# Patient Record
Sex: FEMALE | Race: WHITE | NOT HISPANIC OR LATINO
[De-identification: names, ages, dates, MRNs, and addresses within clinical notes are randomized per-mention and may not be internally consistent; named-entity substitution may affect disease eponyms.]

---

## 2017-01-03 ENCOUNTER — APPOINTMENT (OUTPATIENT)
Dept: GASTROENTEROLOGY | Facility: CLINIC | Age: 77
End: 2017-01-03

## 2017-01-20 ENCOUNTER — APPOINTMENT (OUTPATIENT)
Dept: GASTROENTEROLOGY | Facility: CLINIC | Age: 77
End: 2017-01-20

## 2017-01-20 ENCOUNTER — MEDICATION RENEWAL (OUTPATIENT)
Age: 77
End: 2017-01-20

## 2017-02-14 ENCOUNTER — APPOINTMENT (OUTPATIENT)
Dept: HEART AND VASCULAR | Facility: CLINIC | Age: 77
End: 2017-02-14

## 2017-02-16 ENCOUNTER — APPOINTMENT (OUTPATIENT)
Dept: SURGICAL ONCOLOGY | Facility: CLINIC | Age: 77
End: 2017-02-16

## 2017-02-16 VITALS
BODY MASS INDEX: 33.49 KG/M2 | OXYGEN SATURATION: 99 % | SYSTOLIC BLOOD PRESSURE: 128 MMHG | HEIGHT: 63 IN | DIASTOLIC BLOOD PRESSURE: 83 MMHG | HEART RATE: 64 BPM | WEIGHT: 189 LBS

## 2017-02-16 DIAGNOSIS — Z85.3 PERSONAL HISTORY OF MALIGNANT NEOPLASM OF BREAST: ICD-10-CM

## 2017-02-21 ENCOUNTER — RESULT REVIEW (OUTPATIENT)
Age: 77
End: 2017-02-21

## 2017-02-22 ENCOUNTER — OUTPATIENT (OUTPATIENT)
Dept: OUTPATIENT SERVICES | Facility: HOSPITAL | Age: 77
LOS: 1 days | Discharge: HOME | End: 2017-02-22
Payer: MEDICARE

## 2017-02-22 ENCOUNTER — APPOINTMENT (OUTPATIENT)
Dept: GASTROENTEROLOGY | Facility: HOSPITAL | Age: 77
End: 2017-02-22

## 2017-02-22 DIAGNOSIS — Z90.12 ACQUIRED ABSENCE OF LEFT BREAST AND NIPPLE: Chronic | ICD-10-CM

## 2017-02-22 DIAGNOSIS — Z95.5 PRESENCE OF CORONARY ANGIOPLASTY IMPLANT AND GRAFT: Chronic | ICD-10-CM

## 2017-02-22 PROCEDURE — 45385 COLONOSCOPY W/LESION REMOVAL: CPT

## 2017-02-22 PROCEDURE — 45380 COLONOSCOPY AND BIOPSY: CPT | Mod: XS

## 2017-02-22 PROCEDURE — 88305 TISSUE EXAM BY PATHOLOGIST: CPT

## 2017-02-22 RX ORDER — INDOCYANINE GREEN 25 MG
25 KIT INTRAVASCULAR; INTRAVENOUS ONCE
Qty: 0 | Refills: 0 | Status: DISCONTINUED | OUTPATIENT
Start: 2017-02-22 | End: 2017-02-22

## 2017-02-23 ENCOUNTER — RX RENEWAL (OUTPATIENT)
Age: 77
End: 2017-02-23

## 2017-02-23 LAB — SURGICAL PATHOLOGY STUDY: SIGNIFICANT CHANGE UP

## 2017-02-24 ENCOUNTER — RX RENEWAL (OUTPATIENT)
Age: 77
End: 2017-02-24

## 2017-03-02 DIAGNOSIS — Z95.5 PRESENCE OF CORONARY ANGIOPLASTY IMPLANT AND GRAFT: ICD-10-CM

## 2017-03-02 DIAGNOSIS — Z85.3 PERSONAL HISTORY OF MALIGNANT NEOPLASM OF BREAST: ICD-10-CM

## 2017-03-02 DIAGNOSIS — I25.10 ATHEROSCLEROTIC HEART DISEASE OF NATIVE CORONARY ARTERY WITHOUT ANGINA PECTORIS: ICD-10-CM

## 2017-03-02 DIAGNOSIS — D12.4 BENIGN NEOPLASM OF DESCENDING COLON: ICD-10-CM

## 2017-03-02 DIAGNOSIS — Z88.8 ALLERGY STATUS TO OTHER DRUGS, MEDICAMENTS AND BIOLOGICAL SUBSTANCES STATUS: ICD-10-CM

## 2017-03-02 DIAGNOSIS — K57.30 DIVERTICULOSIS OF LARGE INTESTINE WITHOUT PERFORATION OR ABSCESS WITHOUT BLEEDING: ICD-10-CM

## 2017-03-02 DIAGNOSIS — I10 ESSENTIAL (PRIMARY) HYPERTENSION: ICD-10-CM

## 2017-03-02 DIAGNOSIS — L40.9 PSORIASIS, UNSPECIFIED: ICD-10-CM

## 2017-03-02 DIAGNOSIS — D12.2 BENIGN NEOPLASM OF ASCENDING COLON: ICD-10-CM

## 2017-03-02 DIAGNOSIS — Z79.82 LONG TERM (CURRENT) USE OF ASPIRIN: ICD-10-CM

## 2017-03-02 DIAGNOSIS — Z92.21 PERSONAL HISTORY OF ANTINEOPLASTIC CHEMOTHERAPY: ICD-10-CM

## 2017-03-02 DIAGNOSIS — F32.9 MAJOR DEPRESSIVE DISORDER, SINGLE EPISODE, UNSPECIFIED: ICD-10-CM

## 2017-03-20 ENCOUNTER — RX RENEWAL (OUTPATIENT)
Age: 77
End: 2017-03-20

## 2017-04-17 ENCOUNTER — RX RENEWAL (OUTPATIENT)
Age: 77
End: 2017-04-17

## 2017-04-26 ENCOUNTER — RX RENEWAL (OUTPATIENT)
Age: 77
End: 2017-04-26

## 2017-05-02 ENCOUNTER — APPOINTMENT (OUTPATIENT)
Dept: GASTROENTEROLOGY | Facility: CLINIC | Age: 77
End: 2017-05-02

## 2017-05-02 VITALS
HEIGHT: 63 IN | WEIGHT: 190 LBS | HEART RATE: 65 BPM | TEMPERATURE: 98.1 F | SYSTOLIC BLOOD PRESSURE: 126 MMHG | BODY MASS INDEX: 33.66 KG/M2 | OXYGEN SATURATION: 97 % | DIASTOLIC BLOOD PRESSURE: 75 MMHG | RESPIRATION RATE: 14 BRPM

## 2017-05-23 ENCOUNTER — MEDICATION RENEWAL (OUTPATIENT)
Age: 77
End: 2017-05-23

## 2017-05-24 ENCOUNTER — RX RENEWAL (OUTPATIENT)
Age: 77
End: 2017-05-24

## 2017-06-26 ENCOUNTER — RX RENEWAL (OUTPATIENT)
Age: 77
End: 2017-06-26

## 2017-07-12 ENCOUNTER — CLINICAL ADVICE (OUTPATIENT)
Age: 77
End: 2017-07-12

## 2017-07-13 ENCOUNTER — RX RENEWAL (OUTPATIENT)
Age: 77
End: 2017-07-13

## 2017-07-25 ENCOUNTER — RX RENEWAL (OUTPATIENT)
Age: 77
End: 2017-07-25

## 2017-08-18 ENCOUNTER — RX RENEWAL (OUTPATIENT)
Age: 77
End: 2017-08-18

## 2017-08-22 ENCOUNTER — APPOINTMENT (OUTPATIENT)
Dept: HEART AND VASCULAR | Facility: CLINIC | Age: 77
End: 2017-08-22
Payer: MEDICARE

## 2017-08-22 VITALS
TEMPERATURE: 97.5 F | HEART RATE: 72 BPM | OXYGEN SATURATION: 98 % | HEIGHT: 63 IN | SYSTOLIC BLOOD PRESSURE: 100 MMHG | WEIGHT: 184 LBS | DIASTOLIC BLOOD PRESSURE: 70 MMHG | RESPIRATION RATE: 14 BRPM | BODY MASS INDEX: 32.6 KG/M2

## 2017-08-22 PROCEDURE — 93000 ELECTROCARDIOGRAM COMPLETE: CPT

## 2017-08-22 PROCEDURE — 99214 OFFICE O/P EST MOD 30 MIN: CPT | Mod: 25

## 2017-08-22 PROCEDURE — 36415 COLL VENOUS BLD VENIPUNCTURE: CPT

## 2017-08-23 LAB
25(OH)D3 SERPL-MCNC: 36.6 NG/ML
ALBUMIN SERPL ELPH-MCNC: 4 G/DL
ALP BLD-CCNC: 71 U/L
ALT SERPL-CCNC: 21 U/L
ANION GAP SERPL CALC-SCNC: 11 MMOL/L
AST SERPL-CCNC: 15 U/L
BASOPHILS # BLD AUTO: 0.01 K/UL
BASOPHILS NFR BLD AUTO: 0.2 %
BILIRUB SERPL-MCNC: 0.4 MG/DL
BUN SERPL-MCNC: 20 MG/DL
CALCIUM SERPL-MCNC: 10.6 MG/DL
CHLORIDE SERPL-SCNC: 103 MMOL/L
CHOLEST SERPL-MCNC: 152 MG/DL
CHOLEST/HDLC SERPL: 3.2 RATIO
CO2 SERPL-SCNC: 29 MMOL/L
CREAT SERPL-MCNC: 0.88 MG/DL
EOSINOPHIL # BLD AUTO: 0.17 K/UL
EOSINOPHIL NFR BLD AUTO: 2.9 %
ERYTHROCYTE [SEDIMENTATION RATE] IN BLOOD BY WESTERGREN METHOD: 22 MM/HR
GLUCOSE SERPL-MCNC: 96 MG/DL
HBA1C MFR BLD HPLC: 5.6 %
HCT VFR BLD CALC: 48.3 %
HDLC SERPL-MCNC: 48 MG/DL
HGB BLD-MCNC: 14.4 G/DL
IMM GRANULOCYTES NFR BLD AUTO: 0.2 %
LDLC SERPL CALC-MCNC: 76 MG/DL
LYMPHOCYTES # BLD AUTO: 0.91 K/UL
LYMPHOCYTES NFR BLD AUTO: 15.6 %
MAN DIFF?: NORMAL
MCHC RBC-ENTMCNC: 29.8 GM/DL
MCHC RBC-ENTMCNC: 30.5 PG
MCV RBC AUTO: 102.3 FL
MONOCYTES # BLD AUTO: 0.31 K/UL
MONOCYTES NFR BLD AUTO: 5.3 %
NEUTROPHILS # BLD AUTO: 4.41 K/UL
NEUTROPHILS NFR BLD AUTO: 75.8 %
PLATELET # BLD AUTO: 193 K/UL
POTASSIUM SERPL-SCNC: 5.4 MMOL/L
PROT SERPL-MCNC: 6.9 G/DL
RBC # BLD: 4.72 M/UL
RBC # FLD: 14.3 %
RHEUMATOID FACT SER QL: 9 IU/ML
SODIUM SERPL-SCNC: 143 MMOL/L
TRIGL SERPL-MCNC: 139 MG/DL
TSH SERPL-ACNC: 1.37 UIU/ML
WBC # FLD AUTO: 5.82 K/UL

## 2017-08-24 LAB
ANA PAT FLD IF-IMP: ABNORMAL
ANA SER IF-ACNC: ABNORMAL
B BURGDOR AB SER-IMP: NEGATIVE
B BURGDOR IGM PATRN SER IB-IMP: NEGATIVE
B BURGDOR18/20KD IGM SER QL IB: NORMAL
B BURGDOR18KD IGG SER QL IB: NORMAL
B BURGDOR23KD IGG SER QL IB: NORMAL
B BURGDOR23KD IGM SER QL IB: NORMAL
B BURGDOR28KD AB SER QL IB: NORMAL
B BURGDOR28KD IGG SER QL IB: NORMAL
B BURGDOR30KD AB SER QL IB: NORMAL
B BURGDOR30KD IGG SER QL IB: NORMAL
B BURGDOR31KD IGG SER QL IB: NORMAL
B BURGDOR31KD IGM SER QL IB: NORMAL
B BURGDOR39KD IGG SER QL IB: NORMAL
B BURGDOR39KD IGM SER QL IB: NORMAL
B BURGDOR41KD IGG SER QL IB: PRESENT
B BURGDOR41KD IGM SER QL IB: NORMAL
B BURGDOR45KD AB SER QL IB: NORMAL
B BURGDOR45KD IGG SER QL IB: NORMAL
B BURGDOR58KD AB SER QL IB: NORMAL
B BURGDOR58KD IGG SER QL IB: PRESENT
B BURGDOR66KD IGG SER QL IB: NORMAL
B BURGDOR66KD IGM SER QL IB: NORMAL
B BURGDOR93KD IGG SER QL IB: NORMAL
B BURGDOR93KD IGM SER QL IB: NORMAL
URATE SERPL-MCNC: 5.8 MG/DL

## 2017-09-14 ENCOUNTER — RX RENEWAL (OUTPATIENT)
Age: 77
End: 2017-09-14

## 2017-09-28 ENCOUNTER — APPOINTMENT (OUTPATIENT)
Dept: HEART AND VASCULAR | Facility: CLINIC | Age: 77
End: 2017-09-28
Payer: MEDICARE

## 2017-09-28 VITALS
SYSTOLIC BLOOD PRESSURE: 122 MMHG | TEMPERATURE: 97.4 F | HEART RATE: 60 BPM | OXYGEN SATURATION: 97 % | WEIGHT: 180 LBS | HEIGHT: 63 IN | RESPIRATION RATE: 14 BRPM | DIASTOLIC BLOOD PRESSURE: 80 MMHG | BODY MASS INDEX: 31.89 KG/M2

## 2017-09-28 PROCEDURE — 93306 TTE W/DOPPLER COMPLETE: CPT

## 2017-09-28 PROCEDURE — 99214 OFFICE O/P EST MOD 30 MIN: CPT | Mod: 25

## 2017-09-28 PROCEDURE — 93880 EXTRACRANIAL BILAT STUDY: CPT | Mod: XE

## 2017-10-04 ENCOUNTER — OUTPATIENT (OUTPATIENT)
Dept: OUTPATIENT SERVICES | Facility: HOSPITAL | Age: 77
LOS: 1 days | End: 2017-10-04
Payer: MEDICARE

## 2017-10-04 DIAGNOSIS — Z90.12 ACQUIRED ABSENCE OF LEFT BREAST AND NIPPLE: Chronic | ICD-10-CM

## 2017-10-04 DIAGNOSIS — Z95.5 PRESENCE OF CORONARY ANGIOPLASTY IMPLANT AND GRAFT: Chronic | ICD-10-CM

## 2017-10-04 PROCEDURE — 71250 CT THORAX DX C-: CPT

## 2017-10-04 PROCEDURE — 70491 CT SOFT TISSUE NECK W/DYE: CPT | Mod: 26

## 2017-10-04 PROCEDURE — 71250 CT THORAX DX C-: CPT | Mod: 26

## 2017-10-04 PROCEDURE — 70491 CT SOFT TISSUE NECK W/DYE: CPT

## 2017-10-08 ENCOUNTER — RX RENEWAL (OUTPATIENT)
Age: 77
End: 2017-10-08

## 2017-10-12 ENCOUNTER — APPOINTMENT (OUTPATIENT)
Dept: SURGICAL ONCOLOGY | Facility: CLINIC | Age: 77
End: 2017-10-12
Payer: MEDICARE

## 2017-10-12 VITALS
WEIGHT: 180 LBS | BODY MASS INDEX: 31.89 KG/M2 | DIASTOLIC BLOOD PRESSURE: 67 MMHG | SYSTOLIC BLOOD PRESSURE: 100 MMHG | RESPIRATION RATE: 15 BRPM | HEART RATE: 64 BPM | HEIGHT: 63 IN

## 2017-10-12 PROCEDURE — 99214 OFFICE O/P EST MOD 30 MIN: CPT

## 2017-10-18 ENCOUNTER — APPOINTMENT (OUTPATIENT)
Dept: ENDOCRINOLOGY | Facility: CLINIC | Age: 77
End: 2017-10-18
Payer: MEDICARE

## 2017-10-18 VITALS
WEIGHT: 181 LBS | SYSTOLIC BLOOD PRESSURE: 95 MMHG | BODY MASS INDEX: 32.07 KG/M2 | HEIGHT: 63 IN | DIASTOLIC BLOOD PRESSURE: 65 MMHG | HEART RATE: 66 BPM

## 2017-10-18 PROCEDURE — 76536 US EXAM OF HEAD AND NECK: CPT

## 2017-10-18 PROCEDURE — 99202 OFFICE O/P NEW SF 15 MIN: CPT | Mod: 25

## 2017-10-23 ENCOUNTER — APPOINTMENT (OUTPATIENT)
Dept: GASTROENTEROLOGY | Facility: CLINIC | Age: 77
End: 2017-10-23
Payer: MEDICARE

## 2017-10-23 ENCOUNTER — APPOINTMENT (OUTPATIENT)
Dept: HEART AND VASCULAR | Facility: CLINIC | Age: 77
End: 2017-10-23
Payer: MEDICARE

## 2017-10-23 VITALS
WEIGHT: 181 LBS | OXYGEN SATURATION: 98 % | BODY MASS INDEX: 32.06 KG/M2 | HEART RATE: 72 BPM | SYSTOLIC BLOOD PRESSURE: 140 MMHG | TEMPERATURE: 98.4 F | DIASTOLIC BLOOD PRESSURE: 80 MMHG | RESPIRATION RATE: 14 BRPM

## 2017-10-23 VITALS
SYSTOLIC BLOOD PRESSURE: 124 MMHG | HEART RATE: 70 BPM | RESPIRATION RATE: 14 BRPM | BODY MASS INDEX: 37.21 KG/M2 | WEIGHT: 210 LBS | HEIGHT: 63 IN | TEMPERATURE: 97.6 F | DIASTOLIC BLOOD PRESSURE: 80 MMHG | OXYGEN SATURATION: 96 %

## 2017-10-23 DIAGNOSIS — Z85.3 ENCOUNTER FOR FOLLOW-UP EXAMINATION AFTER COMPLETED TREATMENT FOR MALIGNANT NEOPLASM: ICD-10-CM

## 2017-10-23 DIAGNOSIS — K58.9 IRRITABLE BOWEL SYNDROME W/OUT DIARRHEA: ICD-10-CM

## 2017-10-23 DIAGNOSIS — M25.562 PAIN IN LEFT KNEE: ICD-10-CM

## 2017-10-23 DIAGNOSIS — Z08 ENCOUNTER FOR FOLLOW-UP EXAMINATION AFTER COMPLETED TREATMENT FOR MALIGNANT NEOPLASM: ICD-10-CM

## 2017-10-23 DIAGNOSIS — M54.2 CERVICALGIA: ICD-10-CM

## 2017-10-23 DIAGNOSIS — E78.00 PURE HYPERCHOLESTEROLEMIA, UNSPECIFIED: ICD-10-CM

## 2017-10-23 DIAGNOSIS — E16.2 HYPOGLYCEMIA, UNSPECIFIED: ICD-10-CM

## 2017-10-23 DIAGNOSIS — R59.1 GENERALIZED ENLARGED LYMPH NODES: ICD-10-CM

## 2017-10-23 DIAGNOSIS — I34.0 NONRHEUMATIC MITRAL (VALVE) INSUFFICIENCY: ICD-10-CM

## 2017-10-23 DIAGNOSIS — Z01.818 ENCOUNTER FOR OTHER PREPROCEDURAL EXAMINATION: ICD-10-CM

## 2017-10-23 DIAGNOSIS — E66.9 OBESITY, UNSPECIFIED: ICD-10-CM

## 2017-10-23 DIAGNOSIS — E83.52 HYPERCALCEMIA: ICD-10-CM

## 2017-10-23 PROCEDURE — 99214 OFFICE O/P EST MOD 30 MIN: CPT

## 2017-10-23 RX ORDER — DOXYCYCLINE HYCLATE 100 MG/1
100 TABLET ORAL
Qty: 14 | Refills: 0 | Status: DISCONTINUED | COMMUNITY
Start: 2017-08-22 | End: 2017-10-23

## 2017-10-24 PROBLEM — E83.52 HYPERCALCEMIA: Status: ACTIVE | Noted: 2017-10-24

## 2017-10-26 ENCOUNTER — RX RENEWAL (OUTPATIENT)
Age: 77
End: 2017-10-26

## 2017-11-22 ENCOUNTER — LABORATORY RESULT (OUTPATIENT)
Age: 77
End: 2017-11-22

## 2017-11-28 ENCOUNTER — RX RENEWAL (OUTPATIENT)
Age: 77
End: 2017-11-28

## 2017-11-28 ENCOUNTER — MEDICATION RENEWAL (OUTPATIENT)
Age: 77
End: 2017-11-28

## 2018-01-08 ENCOUNTER — RX RENEWAL (OUTPATIENT)
Age: 78
End: 2018-01-08

## 2018-02-23 ENCOUNTER — RX RENEWAL (OUTPATIENT)
Age: 78
End: 2018-02-23

## 2018-03-22 ENCOUNTER — MEDICATION RENEWAL (OUTPATIENT)
Age: 78
End: 2018-03-22

## 2018-05-21 ENCOUNTER — RX RENEWAL (OUTPATIENT)
Age: 78
End: 2018-05-21

## 2018-05-24 ENCOUNTER — RX RENEWAL (OUTPATIENT)
Age: 78
End: 2018-05-24

## 2018-06-21 ENCOUNTER — RX RENEWAL (OUTPATIENT)
Age: 78
End: 2018-06-21

## 2018-06-25 ENCOUNTER — RX RENEWAL (OUTPATIENT)
Age: 78
End: 2018-06-25

## 2018-07-16 ENCOUNTER — APPOINTMENT (OUTPATIENT)
Dept: GASTROENTEROLOGY | Facility: CLINIC | Age: 78
End: 2018-07-16
Payer: MEDICARE

## 2018-07-16 VITALS
BODY MASS INDEX: 32.07 KG/M2 | SYSTOLIC BLOOD PRESSURE: 130 MMHG | DIASTOLIC BLOOD PRESSURE: 78 MMHG | RESPIRATION RATE: 14 BRPM | HEART RATE: 65 BPM | OXYGEN SATURATION: 98 % | HEIGHT: 63 IN | WEIGHT: 181 LBS

## 2018-07-16 PROCEDURE — 99214 OFFICE O/P EST MOD 30 MIN: CPT

## 2018-07-19 ENCOUNTER — TRANSCRIPTION ENCOUNTER (OUTPATIENT)
Age: 78
End: 2018-07-19

## 2018-08-09 ENCOUNTER — RX RENEWAL (OUTPATIENT)
Age: 78
End: 2018-08-09

## 2018-08-29 ENCOUNTER — RX RENEWAL (OUTPATIENT)
Age: 78
End: 2018-08-29

## 2018-09-24 ENCOUNTER — RX RENEWAL (OUTPATIENT)
Age: 78
End: 2018-09-24

## 2018-10-04 ENCOUNTER — APPOINTMENT (OUTPATIENT)
Dept: HEART AND VASCULAR | Facility: CLINIC | Age: 78
End: 2018-10-04

## 2018-10-25 ENCOUNTER — RX RENEWAL (OUTPATIENT)
Age: 78
End: 2018-10-25

## 2018-11-08 ENCOUNTER — APPOINTMENT (OUTPATIENT)
Dept: HEART AND VASCULAR | Facility: CLINIC | Age: 78
End: 2018-11-08
Payer: MEDICARE

## 2018-11-08 VITALS
OXYGEN SATURATION: 97 % | DIASTOLIC BLOOD PRESSURE: 70 MMHG | TEMPERATURE: 97.9 F | HEIGHT: 63 IN | HEART RATE: 59 BPM | WEIGHT: 177 LBS | BODY MASS INDEX: 31.36 KG/M2 | SYSTOLIC BLOOD PRESSURE: 120 MMHG

## 2018-11-08 DIAGNOSIS — E04.1 NONTOXIC SINGLE THYROID NODULE: ICD-10-CM

## 2018-11-08 PROCEDURE — 36415 COLL VENOUS BLD VENIPUNCTURE: CPT

## 2018-11-08 PROCEDURE — G0439: CPT

## 2018-11-08 PROCEDURE — 93000 ELECTROCARDIOGRAM COMPLETE: CPT

## 2018-11-08 RX ORDER — ALUMINUM HYDROXIDE AND MAGNESIUM CARBONATE 254; 237.5 MG/5ML; MG/5ML
254-237.5 LIQUID ORAL
Qty: 3 | Refills: 6 | Status: DISCONTINUED | COMMUNITY
Start: 2017-05-10 | End: 2018-11-08

## 2018-11-08 NOTE — DISCUSSION/SUMMARY
[FreeTextEntry1] : Hypertension, thyroid nodule, Carotid Doppler and Echocardiogram in 4 weeks--Labs drawn and sent. See ENT for left sided cerumen

## 2018-11-08 NOTE — PHYSICAL EXAM
[General Appearance - Well Developed] : well developed [Normal Appearance] : normal appearance [Well Groomed] : well groomed [General Appearance - Well Nourished] : well nourished [No Deformities] : no deformities [General Appearance - In No Acute Distress] : no acute distress [Normal Conjunctiva] : the conjunctiva exhibited no abnormalities [Normal Oral Mucosa] : normal oral mucosa [No Oral Pallor] : no oral pallor [No Oral Cyanosis] : no oral cyanosis [] : no respiratory distress [Respiration, Rhythm And Depth] : normal respiratory rhythm and effort [Exaggerated Use Of Accessory Muscles For Inspiration] : no accessory muscle use [Auscultation Breath Sounds / Voice Sounds] : lungs were clear to auscultation bilaterally [Heart Sounds] : normal S1 and S2 [Bowel Sounds] : normal bowel sounds [Abdomen Soft] : soft [Abdomen Tenderness] : non-tender [Abnormal Walk] : normal gait [FreeTextEntry1] : no edema [Skin Turgor] : normal skin turgor [Oriented To Time, Place, And Person] : oriented to person, place, and time [Affect] : the affect was normal [Mood] : the mood was normal [No Anxiety] : not feeling anxious

## 2018-11-09 ENCOUNTER — RX RENEWAL (OUTPATIENT)
Age: 78
End: 2018-11-09

## 2018-11-09 LAB
25(OH)D3 SERPL-MCNC: 33.6 NG/ML
ALBUMIN SERPL ELPH-MCNC: 4.5 G/DL
ALP BLD-CCNC: 69 U/L
ALT SERPL-CCNC: 11 U/L
ANION GAP SERPL CALC-SCNC: 10 MMOL/L
AST SERPL-CCNC: 14 U/L
BASOPHILS # BLD AUTO: 0.01 K/UL
BASOPHILS NFR BLD AUTO: 0.2 %
BILIRUB SERPL-MCNC: 0.5 MG/DL
BUN SERPL-MCNC: 15 MG/DL
CALCIUM SERPL-MCNC: 10.4 MG/DL
CHLORIDE SERPL-SCNC: 103 MMOL/L
CHOLEST SERPL-MCNC: 155 MG/DL
CHOLEST/HDLC SERPL: 3 RATIO
CO2 SERPL-SCNC: 33 MMOL/L
CREAT SERPL-MCNC: 0.75 MG/DL
EOSINOPHIL # BLD AUTO: 0.15 K/UL
EOSINOPHIL NFR BLD AUTO: 2.7 %
FOLATE SERPL-MCNC: 16.8 NG/ML
GLUCOSE SERPL-MCNC: 105 MG/DL
HBA1C MFR BLD HPLC: 5.5 %
HCT VFR BLD CALC: 45.8 %
HDLC SERPL-MCNC: 51 MG/DL
HGB BLD-MCNC: 14 G/DL
IMM GRANULOCYTES NFR BLD AUTO: 0.2 %
LDLC SERPL CALC-MCNC: 85 MG/DL
LYMPHOCYTES # BLD AUTO: 0.96 K/UL
LYMPHOCYTES NFR BLD AUTO: 17.5 %
MAN DIFF?: NORMAL
MCHC RBC-ENTMCNC: 30 PG
MCHC RBC-ENTMCNC: 30.6 GM/DL
MCV RBC AUTO: 98.1 FL
MONOCYTES # BLD AUTO: 0.35 K/UL
MONOCYTES NFR BLD AUTO: 6.4 %
NEUTROPHILS # BLD AUTO: 4.01 K/UL
NEUTROPHILS NFR BLD AUTO: 73 %
PLATELET # BLD AUTO: 203 K/UL
POTASSIUM SERPL-SCNC: 5 MMOL/L
PROT SERPL-MCNC: 6.9 G/DL
RBC # BLD: 4.67 M/UL
RBC # FLD: 14.8 %
SODIUM SERPL-SCNC: 146 MMOL/L
TRIGL SERPL-MCNC: 94 MG/DL
TSH SERPL-ACNC: 1.11 UIU/ML
VIT B12 SERPL-MCNC: 488 PG/ML
WBC # FLD AUTO: 5.49 K/UL

## 2018-11-14 ENCOUNTER — RX RENEWAL (OUTPATIENT)
Age: 78
End: 2018-11-14

## 2018-11-14 ENCOUNTER — APPOINTMENT (OUTPATIENT)
Dept: ULTRASOUND IMAGING | Facility: HOSPITAL | Age: 78
End: 2018-11-14
Payer: MEDICARE

## 2018-11-14 ENCOUNTER — OUTPATIENT (OUTPATIENT)
Dept: OUTPATIENT SERVICES | Facility: HOSPITAL | Age: 78
LOS: 1 days | End: 2018-11-14
Payer: MEDICARE

## 2018-11-14 DIAGNOSIS — Z90.12 ACQUIRED ABSENCE OF LEFT BREAST AND NIPPLE: Chronic | ICD-10-CM

## 2018-11-14 DIAGNOSIS — Z95.5 PRESENCE OF CORONARY ANGIOPLASTY IMPLANT AND GRAFT: Chronic | ICD-10-CM

## 2018-11-14 PROCEDURE — 76536 US EXAM OF HEAD AND NECK: CPT

## 2018-11-14 PROCEDURE — 76536 US EXAM OF HEAD AND NECK: CPT | Mod: 26

## 2018-11-16 ENCOUNTER — MEDICATION RENEWAL (OUTPATIENT)
Age: 78
End: 2018-11-16

## 2018-11-18 ENCOUNTER — RX RENEWAL (OUTPATIENT)
Age: 78
End: 2018-11-18

## 2018-11-30 ENCOUNTER — APPOINTMENT (OUTPATIENT)
Dept: CT IMAGING | Facility: HOSPITAL | Age: 78
End: 2018-11-30

## 2018-11-30 ENCOUNTER — APPOINTMENT (OUTPATIENT)
Dept: HEART AND VASCULAR | Facility: CLINIC | Age: 78
End: 2018-11-30
Payer: MEDICARE

## 2018-11-30 ENCOUNTER — OUTPATIENT (OUTPATIENT)
Dept: OUTPATIENT SERVICES | Facility: HOSPITAL | Age: 78
LOS: 1 days | End: 2018-11-30
Payer: MEDICARE

## 2018-11-30 VITALS
RESPIRATION RATE: 14 BRPM | OXYGEN SATURATION: 95 % | SYSTOLIC BLOOD PRESSURE: 144 MMHG | TEMPERATURE: 97.4 F | WEIGHT: 179 LBS | HEART RATE: 63 BPM | HEIGHT: 63 IN | BODY MASS INDEX: 31.71 KG/M2 | DIASTOLIC BLOOD PRESSURE: 76 MMHG

## 2018-11-30 DIAGNOSIS — Z95.5 PRESENCE OF CORONARY ANGIOPLASTY IMPLANT AND GRAFT: Chronic | ICD-10-CM

## 2018-11-30 DIAGNOSIS — R91.1 SOLITARY PULMONARY NODULE: ICD-10-CM

## 2018-11-30 DIAGNOSIS — Z90.12 ACQUIRED ABSENCE OF LEFT BREAST AND NIPPLE: Chronic | ICD-10-CM

## 2018-11-30 PROCEDURE — 71250 CT THORAX DX C-: CPT

## 2018-11-30 PROCEDURE — 99214 OFFICE O/P EST MOD 30 MIN: CPT

## 2018-11-30 PROCEDURE — 71250 CT THORAX DX C-: CPT | Mod: 26

## 2018-11-30 NOTE — HISTORY OF PRESENT ILLNESS
[FreeTextEntry1] : 78 year female who was on Levaquin for a pulmonary infection but stopped when she developed round red circles on her skin. A CXR at [facility] where she was prescribed the Levaquin should a 15 mm nodule. She is scheduled for a CT of her chest with contrast later today. She notes a loose cough with clear mucous. Overall her symptoms are improving but she feels "really wiped out". She can feel shaky and tremor for the past week

## 2018-11-30 NOTE — PHYSICAL EXAM
[General Appearance - Well Developed] : well developed [Normal Appearance] : normal appearance [Well Groomed] : well groomed [General Appearance - Well Nourished] : well nourished [No Deformities] : no deformities [General Appearance - In No Acute Distress] : no acute distress [Normal Conjunctiva] : the conjunctiva exhibited no abnormalities [FreeTextEntry1] : cerumen on left>right [] : no respiratory distress [Respiration, Rhythm And Depth] : normal respiratory rhythm and effort [Exaggerated Use Of Accessory Muscles For Inspiration] : no accessory muscle use [Auscultation Breath Sounds / Voice Sounds] : lungs were clear to auscultation bilaterally [Abnormal Walk] : normal gait [Skin Turgor] : normal skin turgor [Oriented To Time, Place, And Person] : oriented to person, place, and time [Affect] : the affect was normal [Mood] : the mood was normal [No Anxiety] : not feeling anxious

## 2018-12-07 ENCOUNTER — APPOINTMENT (OUTPATIENT)
Dept: HEART AND VASCULAR | Facility: CLINIC | Age: 78
End: 2018-12-07

## 2018-12-07 ENCOUNTER — APPOINTMENT (OUTPATIENT)
Dept: HEART AND VASCULAR | Facility: CLINIC | Age: 78
End: 2018-12-07
Payer: MEDICARE

## 2018-12-07 VITALS
SYSTOLIC BLOOD PRESSURE: 120 MMHG | DIASTOLIC BLOOD PRESSURE: 60 MMHG | RESPIRATION RATE: 13 BRPM | WEIGHT: 179 LBS | BODY MASS INDEX: 31.71 KG/M2 | HEIGHT: 63 IN | TEMPERATURE: 97.1 F | OXYGEN SATURATION: 94 % | HEART RATE: 78 BPM

## 2018-12-07 DIAGNOSIS — R05 COUGH: ICD-10-CM

## 2018-12-07 DIAGNOSIS — R93.89 ABNORMAL FINDINGS ON DIAGNOSTIC IMAGING OF OTHER SPECIFIED BODY STRUCTURES: ICD-10-CM

## 2018-12-07 PROCEDURE — 99214 OFFICE O/P EST MOD 30 MIN: CPT

## 2018-12-07 NOTE — PHYSICAL EXAM
[General Appearance - Well Developed] : well developed [Normal Appearance] : normal appearance [Well Groomed] : well groomed [General Appearance - Well Nourished] : well nourished [No Deformities] : no deformities [General Appearance - In No Acute Distress] : no acute distress [Normal Oral Mucosa] : normal oral mucosa [No Oral Pallor] : no oral pallor [No Oral Cyanosis] : no oral cyanosis [] : no respiratory distress [Respiration, Rhythm And Depth] : normal respiratory rhythm and effort [Exaggerated Use Of Accessory Muscles For Inspiration] : no accessory muscle use [FreeTextEntry1] : rales on left side [Heart Rate And Rhythm] : heart rate and rhythm were normal [Heart Sounds] : normal S1 and S2 [Abdomen Soft] : soft [Abnormal Walk] : normal gait [Skin Turgor] : normal skin turgor [Oriented To Time, Place, And Person] : oriented to person, place, and time [Affect] : the affect was normal [Mood] : the mood was normal [No Anxiety] : not feeling anxious

## 2018-12-07 NOTE — DISCUSSION/SUMMARY
[FreeTextEntry1] : URI with abnormal chest CT, PE suggestive of pneumonia--Albuterol Nebulizer treatment with subjective improvement but exam with continue crackles at left chest. Start Medrol Dose pack. ProAir inhaler. See Dr Davis ideally next week. If worsens to go to ER at [facility]

## 2018-12-09 ENCOUNTER — INPATIENT (INPATIENT)
Facility: HOSPITAL | Age: 78
LOS: 0 days | Discharge: HOME | DRG: 195 | End: 2018-12-10
Attending: INTERNAL MEDICINE | Admitting: INTERNAL MEDICINE
Payer: COMMERCIAL

## 2018-12-09 VITALS
WEIGHT: 177.91 LBS | TEMPERATURE: 98 F | SYSTOLIC BLOOD PRESSURE: 147 MMHG | RESPIRATION RATE: 18 BRPM | HEART RATE: 75 BPM | DIASTOLIC BLOOD PRESSURE: 85 MMHG | OXYGEN SATURATION: 96 %

## 2018-12-09 DIAGNOSIS — I10 ESSENTIAL (PRIMARY) HYPERTENSION: ICD-10-CM

## 2018-12-09 DIAGNOSIS — J18.9 PNEUMONIA, UNSPECIFIED ORGANISM: ICD-10-CM

## 2018-12-09 DIAGNOSIS — Z91.89 OTHER SPECIFIED PERSONAL RISK FACTORS, NOT ELSEWHERE CLASSIFIED: ICD-10-CM

## 2018-12-09 DIAGNOSIS — F32.9 MAJOR DEPRESSIVE DISORDER, SINGLE EPISODE, UNSPECIFIED: ICD-10-CM

## 2018-12-09 DIAGNOSIS — I25.10 ATHEROSCLEROTIC HEART DISEASE OF NATIVE CORONARY ARTERY WITHOUT ANGINA PECTORIS: ICD-10-CM

## 2018-12-09 DIAGNOSIS — Z90.12 ACQUIRED ABSENCE OF LEFT BREAST AND NIPPLE: Chronic | ICD-10-CM

## 2018-12-09 DIAGNOSIS — Z95.5 PRESENCE OF CORONARY ANGIOPLASTY IMPLANT AND GRAFT: Chronic | ICD-10-CM

## 2018-12-09 DIAGNOSIS — K57.30 DIVERTICULOSIS OF LARGE INTESTINE WITHOUT PERFORATION OR ABSCESS WITHOUT BLEEDING: ICD-10-CM

## 2018-12-09 DIAGNOSIS — Z29.9 ENCOUNTER FOR PROPHYLACTIC MEASURES, UNSPECIFIED: ICD-10-CM

## 2018-12-09 LAB
ALBUMIN SERPL ELPH-MCNC: 4.1 G/DL — SIGNIFICANT CHANGE UP (ref 3.3–5)
ALP SERPL-CCNC: 69 U/L — SIGNIFICANT CHANGE UP (ref 40–120)
ALT FLD-CCNC: 37 U/L — SIGNIFICANT CHANGE UP (ref 10–45)
ANION GAP SERPL CALC-SCNC: 10 MMOL/L — SIGNIFICANT CHANGE UP (ref 5–17)
AST SERPL-CCNC: 27 U/L — SIGNIFICANT CHANGE UP (ref 10–40)
BASOPHILS NFR BLD AUTO: 0.2 % — SIGNIFICANT CHANGE UP (ref 0–2)
BILIRUB SERPL-MCNC: 0.4 MG/DL — SIGNIFICANT CHANGE UP (ref 0.2–1.2)
BUN SERPL-MCNC: 22 MG/DL — SIGNIFICANT CHANGE UP (ref 7–23)
CALCIUM SERPL-MCNC: 10.2 MG/DL — SIGNIFICANT CHANGE UP (ref 8.4–10.5)
CHLORIDE SERPL-SCNC: 99 MMOL/L — SIGNIFICANT CHANGE UP (ref 96–108)
CO2 SERPL-SCNC: 32 MMOL/L — HIGH (ref 22–31)
CREAT SERPL-MCNC: 0.71 MG/DL — SIGNIFICANT CHANGE UP (ref 0.5–1.3)
EOSINOPHIL NFR BLD AUTO: 0.7 % — SIGNIFICANT CHANGE UP (ref 0–6)
GLUCOSE SERPL-MCNC: 109 MG/DL — HIGH (ref 70–99)
HCT VFR BLD CALC: 44.1 % — SIGNIFICANT CHANGE UP (ref 34.5–45)
HGB BLD-MCNC: 14.1 G/DL — SIGNIFICANT CHANGE UP (ref 11.5–15.5)
LACTATE SERPL-SCNC: 2 MMOL/L — SIGNIFICANT CHANGE UP (ref 0.5–2)
LYMPHOCYTES # BLD AUTO: 10.3 % — LOW (ref 13–44)
MCHC RBC-ENTMCNC: 30.3 PG — SIGNIFICANT CHANGE UP (ref 27–34)
MCHC RBC-ENTMCNC: 32 G/DL — SIGNIFICANT CHANGE UP (ref 32–36)
MCV RBC AUTO: 94.8 FL — SIGNIFICANT CHANGE UP (ref 80–100)
MONOCYTES NFR BLD AUTO: 8 % — SIGNIFICANT CHANGE UP (ref 2–14)
NEUTROPHILS NFR BLD AUTO: 80.8 % — HIGH (ref 43–77)
PLATELET # BLD AUTO: 308 K/UL — SIGNIFICANT CHANGE UP (ref 150–400)
POTASSIUM SERPL-MCNC: 3.8 MMOL/L — SIGNIFICANT CHANGE UP (ref 3.5–5.3)
POTASSIUM SERPL-SCNC: 3.8 MMOL/L — SIGNIFICANT CHANGE UP (ref 3.5–5.3)
PROT SERPL-MCNC: 7.4 G/DL — SIGNIFICANT CHANGE UP (ref 6–8.3)
RAPID RVP RESULT: SIGNIFICANT CHANGE UP
RBC # BLD: 4.65 M/UL — SIGNIFICANT CHANGE UP (ref 3.8–5.2)
RBC # FLD: 13.6 % — SIGNIFICANT CHANGE UP (ref 10.3–16.9)
SODIUM SERPL-SCNC: 141 MMOL/L — SIGNIFICANT CHANGE UP (ref 135–145)
WBC # BLD: 12.8 K/UL — HIGH (ref 3.8–10.5)
WBC # FLD AUTO: 12.8 K/UL — HIGH (ref 3.8–10.5)

## 2018-12-09 PROCEDURE — 99285 EMERGENCY DEPT VISIT HI MDM: CPT

## 2018-12-09 PROCEDURE — 99233 SBSQ HOSP IP/OBS HIGH 50: CPT | Mod: GC

## 2018-12-09 PROCEDURE — 71046 X-RAY EXAM CHEST 2 VIEWS: CPT | Mod: 26

## 2018-12-09 RX ORDER — CEFTRIAXONE 500 MG/1
1 INJECTION, POWDER, FOR SOLUTION INTRAMUSCULAR; INTRAVENOUS ONCE
Qty: 0 | Refills: 0 | Status: COMPLETED | OUTPATIENT
Start: 2018-12-09 | End: 2018-12-09

## 2018-12-09 RX ORDER — SODIUM CHLORIDE 9 MG/ML
1000 INJECTION INTRAMUSCULAR; INTRAVENOUS; SUBCUTANEOUS ONCE
Qty: 0 | Refills: 0 | Status: COMPLETED | OUTPATIENT
Start: 2018-12-09 | End: 2018-12-09

## 2018-12-09 RX ORDER — ATENOLOL 25 MG/1
50 TABLET ORAL DAILY
Qty: 0 | Refills: 0 | Status: DISCONTINUED | OUTPATIENT
Start: 2018-12-09 | End: 2018-12-10

## 2018-12-09 RX ORDER — FAMOTIDINE 10 MG/ML
20 INJECTION INTRAVENOUS
Qty: 0 | Refills: 0 | Status: DISCONTINUED | OUTPATIENT
Start: 2018-12-09 | End: 2018-12-10

## 2018-12-09 RX ORDER — ASPIRIN/CALCIUM CARB/MAGNESIUM 324 MG
81 TABLET ORAL DAILY
Qty: 0 | Refills: 0 | Status: DISCONTINUED | OUTPATIENT
Start: 2018-12-09 | End: 2018-12-10

## 2018-12-09 RX ORDER — IPRATROPIUM/ALBUTEROL SULFATE 18-103MCG
3 AEROSOL WITH ADAPTER (GRAM) INHALATION EVERY 6 HOURS
Qty: 0 | Refills: 0 | Status: DISCONTINUED | OUTPATIENT
Start: 2018-12-09 | End: 2018-12-10

## 2018-12-09 RX ORDER — ATORVASTATIN CALCIUM 80 MG/1
20 TABLET, FILM COATED ORAL AT BEDTIME
Qty: 0 | Refills: 0 | Status: DISCONTINUED | OUTPATIENT
Start: 2018-12-09 | End: 2018-12-10

## 2018-12-09 RX ORDER — AZITHROMYCIN 500 MG/1
500 TABLET, FILM COATED ORAL ONCE
Qty: 0 | Refills: 0 | Status: COMPLETED | OUTPATIENT
Start: 2018-12-09 | End: 2018-12-09

## 2018-12-09 RX ORDER — AMLODIPINE BESYLATE 2.5 MG/1
5 TABLET ORAL DAILY
Qty: 0 | Refills: 0 | Status: DISCONTINUED | OUTPATIENT
Start: 2018-12-09 | End: 2018-12-10

## 2018-12-09 RX ORDER — AZITHROMYCIN 500 MG/1
500 TABLET, FILM COATED ORAL DAILY
Qty: 0 | Refills: 0 | Status: DISCONTINUED | OUTPATIENT
Start: 2018-12-10 | End: 2018-12-10

## 2018-12-09 RX ORDER — DULOXETINE HYDROCHLORIDE 30 MG/1
60 CAPSULE, DELAYED RELEASE ORAL DAILY
Qty: 0 | Refills: 0 | Status: DISCONTINUED | OUTPATIENT
Start: 2018-12-09 | End: 2018-12-10

## 2018-12-09 RX ORDER — CEFTRIAXONE 500 MG/1
1 INJECTION, POWDER, FOR SOLUTION INTRAMUSCULAR; INTRAVENOUS EVERY 24 HOURS
Qty: 0 | Refills: 0 | Status: DISCONTINUED | OUTPATIENT
Start: 2018-12-10 | End: 2018-12-10

## 2018-12-09 RX ADMIN — SODIUM CHLORIDE 1000 MILLILITER(S): 9 INJECTION INTRAMUSCULAR; INTRAVENOUS; SUBCUTANEOUS at 12:10

## 2018-12-09 RX ADMIN — DULOXETINE HYDROCHLORIDE 60 MILLIGRAM(S): 30 CAPSULE, DELAYED RELEASE ORAL at 16:11

## 2018-12-09 RX ADMIN — SODIUM CHLORIDE 1000 MILLILITER(S): 9 INJECTION INTRAMUSCULAR; INTRAVENOUS; SUBCUTANEOUS at 14:37

## 2018-12-09 RX ADMIN — AZITHROMYCIN 255 MILLIGRAM(S): 500 TABLET, FILM COATED ORAL at 12:48

## 2018-12-09 RX ADMIN — FAMOTIDINE 20 MILLIGRAM(S): 10 INJECTION INTRAVENOUS at 16:11

## 2018-12-09 RX ADMIN — AZITHROMYCIN 500 MILLIGRAM(S): 500 TABLET, FILM COATED ORAL at 14:37

## 2018-12-09 RX ADMIN — AMLODIPINE BESYLATE 5 MILLIGRAM(S): 2.5 TABLET ORAL at 16:11

## 2018-12-09 RX ADMIN — ATENOLOL 50 MILLIGRAM(S): 25 TABLET ORAL at 19:17

## 2018-12-09 RX ADMIN — ATORVASTATIN CALCIUM 20 MILLIGRAM(S): 80 TABLET, FILM COATED ORAL at 21:46

## 2018-12-09 RX ADMIN — CEFTRIAXONE 100 GRAM(S): 500 INJECTION, POWDER, FOR SOLUTION INTRAMUSCULAR; INTRAVENOUS at 12:09

## 2018-12-09 RX ADMIN — CEFTRIAXONE 1 GRAM(S): 500 INJECTION, POWDER, FOR SOLUTION INTRAMUSCULAR; INTRAVENOUS at 12:49

## 2018-12-09 RX ADMIN — Medication 100 MILLIGRAM(S): at 21:46

## 2018-12-09 RX ADMIN — Medication 81 MILLIGRAM(S): at 16:11

## 2018-12-09 NOTE — ED PROVIDER NOTE - OBJECTIVE STATEMENT
Pt is an 79yo f, h/o left breast ca, htn, cad s/p stent, Pt is an 79yo f, h/o left breast ca, htn, cad s/p stent, diverticulitis, who p/w persistent cough x 2 wks, prod white/ yellow sputum. No fever, + chills. + mild cp w/ coughing only, no sob. Was seen at [facility] and had cxr done which showed bronchitis, early pna. Pt was started on levaquin which she only took x 2 days due to allergic rxn. Pt in meantime also had ct chest which showed lung nodule(s) and inflammation of bronchi. Pt was seen by Dr. Stevens and started on augmentin and medrol dose pack, currently on day #4, however still not feeling well and is very weak. + chronic lower abd discomfort 2/2 diverticular dz, no vomiting, diarrhea. No dysuria, flank pain.

## 2018-12-09 NOTE — H&P ADULT - PROBLEM SELECTOR PLAN 6
F: No IVF   E: Replete electrolytes for a K of 4 and Mg of 2   N: DASH/TLC  P: SCDs  C: Full Code  D: Admit to [facility]

## 2018-12-09 NOTE — H&P ADULT - NSHPPHYSICALEXAM_GEN_ALL_CORE
VITAL SIGNS:  T(C): 36.7 (12-09-18 @ 14:14), Max: 37.1 (12-09-18 @ 10:39)  T(F): 98.1 (12-09-18 @ 14:14), Max: 98.7 (12-09-18 @ 10:39)  HR: 67 (12-09-18 @ 14:14) (67 - 75)  BP: 132/74 (12-09-18 @ 14:14) (132/74 - 147/85)  BP(mean): --  RR: 18 (12-09-18 @ 14:14) (18 - 18)  SpO2: 96% (12-09-18 @ 14:14) (94% - 96%)  Wt(kg): --    PHYSICAL EXAM:    Constitutional: WDWN resting comfortably in bed; NAD  Head: NC/AT  Eyes: clear conjunctiva  ENT: no nasal discharge; uvula midline, no oropharyngeal erythema or exudates; MMM  Neck: supple; no JVD or thyromegaly  Respiratory: Decreased breath sounds at the left base  Cardiac: +S1/S2; RRR; no M/R/G; PMI non-displaced  Gastrointestinal: soft, NT/ND; no rebound or guarding; +BSx4  Back: spine midline, no bony tenderness or step-offs; no CVAT B/L  Extremities: WWP, no clubbing or cyanosis; no peripheral edema  Musculoskeletal: NROM x4; no joint swelling, tenderness or erythema  Neurologic: AAOx3; CNII-XII grossly intact; no focal deficits  Psychiatric: affect and characteristics of appearance, verbalizations, behaviors are appropriate

## 2018-12-09 NOTE — ED ADULT NURSE NOTE - PMH
Atherosclerosis of coronary artery  s/p PCI  Breast cancer in female  Left  Diverticulitis of colon  Diverticulitis  Essential hypertension  HTN (hypertension)  Gastroesophageal reflux disease  GERD (gastroesophageal reflux disease)  Hyperlipidemia  HLD (hyperlipidemia)

## 2018-12-09 NOTE — H&P ADULT - PROBLEM SELECTOR PLAN 7
1) PCP: Dr Stevens   Contacted on Admission:  2) Date of Contact with PCP:  3) PCP Contacted at Discharge: (Y/N)  4) Summary of Handoff Given to PCP:   5) Post-Discharge Appointment Date and Location:

## 2018-12-09 NOTE — ED ADULT NURSE NOTE - NSIMPLEMENTINTERV_GEN_ALL_ED
Implemented All Universal Safety Interventions:  [place] to call system. Call bell, personal items and telephone within reach. Instruct patient to call for assistance. Room bathroom lighting operational. Non-slip footwear when patient is off stretcher. Physically safe environment: no spills, clutter or unnecessary equipment. Stretcher in lowest position, wheels locked, appropriate side rails in place.

## 2018-12-09 NOTE — H&P ADULT - NSHPSOCIALHISTORY_GEN_ALL_CORE
Former smoker, quits in the 70s  Non-drinker  No illicit drugs  Lives on her own on the [facility]

## 2018-12-09 NOTE — ED ADULT TRIAGE NOTE - CHIEF COMPLAINT QUOTE
Pt c/o cough for the past two weeks, had xray and CT scan,  told her on Friday she has pneumonia and placed on antibiotics.

## 2018-12-09 NOTE — ED PROVIDER NOTE - PHYSICAL EXAMINATION
VITAL SIGNS: I have reviewed nursing notes and confirm.  CONSTITUTIONAL: Well-developed; well-nourished; in no acute distress.   SKIN:  warm and dry, no acute rash.   HEAD:  normocephalic, atraumatic.  EYES: PERRL, EOM intact; conjunctiva and sclera clear.  ENT: No nasal discharge; airway clear.   NECK: Supple; non tender.  CARD: S1, S2 normal; no murmurs, gallops, or rubs. Regular rate and rhythm.   RESP:  + rales to left lung base. No wheezes/ rhonchi.   ABD: Normal bowel sounds; soft; non-distended; non-tender; no guarding/ rebound.  EXT: Normal ROM. No clubbing, cyanosis or edema. 2+ pulses to b/l ue/le.  NEURO: Alert, oriented, grossly unremarkable  PSYCH: Cooperative, mood and affect appropriate.

## 2018-12-09 NOTE — H&P ADULT - HISTORY OF PRESENT ILLNESS
78F PMhx of CAD, HTN, depression, and diverticulosis who presents with cough. 78F PMhx of CAD, HTN, depression, and diverticulosis who presents with worsening cough productive of yellowish sputum. Her cough started about 2 weeks ago and has been getting worse. She was seen at [facility] and diagnosed with PNA, and given Levaquin. She developed blotchiness from the Levaquin, so she stopped after 2 doses. At [facility], they also saw some lung nodules, so her PCP, Dr Stevens sent her for lung CT. When he saw the results of the CT, he wrote her a prescription for Augmentin which he took her off of after 2 days and replaced with a medrol dose pack. She states that she has been feeling worse, getting some body aches, as well as perspiring more than usual. She also reports feeling a bit unsteady, which is worsened by this illness, and chest soreness from all of her coughing. She denies any fevers, chills, headaches, wheezing, sneezing, nausea, vomiting, dysuria, or sick contacts.   Upon arrival to the ED: T 97.8, HR 75, /85, R 18, SpO2 96%RA. She was given ceftriaxone, azithromycin, and 1L NS.

## 2018-12-09 NOTE — ED PROVIDER NOTE - MEDICAL DECISION MAKING DETAILS
Impression: lll pna, failing outpt management w/ abx/ medrol dose pack. Afebrile. HDS. + mild leukocytosis to 12. CMP unremarkable. Pt ordered for ivf and ceftriaxone/ zithromax for cap. Case d/w Dr. Chaudhary, covering for Dr. Stevens. Will admit to hospitalist [facility] for iv [place]x/ iv hydration.

## 2018-12-09 NOTE — ED ADULT NURSE NOTE - PSH
History of heart artery stent    History of lumpectomy of left breast    Other postprocedural status  S/P appendectomy  Status post total hysterectomy  S/P hysterectomy

## 2018-12-09 NOTE — ED ADULT NURSE NOTE - OBJECTIVE STATEMENT
Pt w/ PMH of left-sided breast CA [id number] ago in remission and diverticulitis presents to ED today c/o persistent cough x2 weeks.  Pt had outpatient XR and CT which showed potential pneumonia.  Pt on augmentin, but elicits she is not improving.  Pt denies objective fever, N/V or SOB.  Pt denies taking any medications today.  Pt states she had the flu vax, but did not have the pneumococcal vax.  Pt is pending eval by [facility].

## 2018-12-09 NOTE — H&P ADULT - ATTENDING COMMENTS
Patient seen and examined by myself at bedside. Reports continued cough, denies fevers or chills. Physical exam notable for crackles and egophony over left lower lobe of lung fields, otherwise non-contributory. Will cover with Ceft/Azithro for CAP. Duonebs PRN, Tesslon perles for cough suppression.     Rest of history, physical and plan as detailed above.

## 2018-12-09 NOTE — H&P ADULT - NSHPLABSRESULTS_GEN_ALL_CORE
LABS:                         14.1   12.8  )-----------( 308      ( 09 Dec 2018 11:54 )             44.1     12-09    141  |  99  |  22  ----------------------------<  109<H>  3.8   |  32<H>  |  0.71    Ca    10.2      09 Dec 2018 11:54    TPro  7.4  /  Alb  4.1  /  TBili  0.4  /  DBili  x   /  AST  27  /  ALT  37  /  AlkPhos  69  12-09        Lactate, Blood: 2.0 mmoL/L (12-09 @ 11:54)      RADIOLOGY, EKG & ADDITIONAL TESTS:   < from: Xray Chest 2 Views PA/Lat (12.09.18 @ 11:33) >    INTERPRETATION:  Clinical History: Cough    Frontal and lateral examination of the chest demonstrates left basilar   infiltrates. Mild dextro scoliosis thoracicspine with degenerative   changes. Calcification noted involving aortic knob. Surgical clips   overlying left axillary region.    Impression: Left basilar infiltrates      < end of copied text >    < from: CT Chest No Cont (11.30.18 @ 14:43) >    IMPRESSION:    1. Worsening large airway inflammation characterized by bronchial wall   thickening, bronchiectasis and mucous plugging most pronounced within the   superior segment to the left lower lobe with tubular shaped nodular   opacities as described above the largest measuring 9 mm. These are   thought to be infectious and/or inflammatory etiology however as per   Fleischner society 2017 guidelines, short interval surveillance is   suggested in approximately 3 months after therapeutic administration to   confirm stability and/or resolution.   2. 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 Mild cardiomegaly.   Dense coronary artery calcification.    < end of copied text >

## 2018-12-10 ENCOUNTER — TRANSCRIPTION ENCOUNTER (OUTPATIENT)
Age: 78
End: 2018-12-10

## 2018-12-10 VITALS
DIASTOLIC BLOOD PRESSURE: 79 MMHG | RESPIRATION RATE: 18 BRPM | SYSTOLIC BLOOD PRESSURE: 135 MMHG | HEART RATE: 71 BPM | TEMPERATURE: 97 F | OXYGEN SATURATION: 94 %

## 2018-12-10 LAB
ANION GAP SERPL CALC-SCNC: 11 MMOL/L — SIGNIFICANT CHANGE UP (ref 5–17)
BASOPHILS NFR BLD AUTO: 0.2 % — SIGNIFICANT CHANGE UP (ref 0–2)
BUN SERPL-MCNC: 19 MG/DL — SIGNIFICANT CHANGE UP (ref 7–23)
CALCIUM SERPL-MCNC: 9.3 MG/DL — SIGNIFICANT CHANGE UP (ref 8.4–10.5)
CHLORIDE SERPL-SCNC: 102 MMOL/L — SIGNIFICANT CHANGE UP (ref 96–108)
CO2 SERPL-SCNC: 29 MMOL/L — SIGNIFICANT CHANGE UP (ref 22–31)
CREAT SERPL-MCNC: 0.65 MG/DL — SIGNIFICANT CHANGE UP (ref 0.5–1.3)
EOSINOPHIL NFR BLD AUTO: 2.4 % — SIGNIFICANT CHANGE UP (ref 0–6)
GLUCOSE SERPL-MCNC: 110 MG/DL — HIGH (ref 70–99)
HCT VFR BLD CALC: 40.9 % — SIGNIFICANT CHANGE UP (ref 34.5–45)
HGB BLD-MCNC: 12.8 G/DL — SIGNIFICANT CHANGE UP (ref 11.5–15.5)
LYMPHOCYTES # BLD AUTO: 12.8 % — LOW (ref 13–44)
MAGNESIUM SERPL-MCNC: 2 MG/DL — SIGNIFICANT CHANGE UP (ref 1.6–2.6)
MCHC RBC-ENTMCNC: 29.6 PG — SIGNIFICANT CHANGE UP (ref 27–34)
MCHC RBC-ENTMCNC: 31.3 G/DL — LOW (ref 32–36)
MCV RBC AUTO: 94.7 FL — SIGNIFICANT CHANGE UP (ref 80–100)
MONOCYTES NFR BLD AUTO: 11.1 % — SIGNIFICANT CHANGE UP (ref 2–14)
NEUTROPHILS NFR BLD AUTO: 73.5 % — SIGNIFICANT CHANGE UP (ref 43–77)
PLATELET # BLD AUTO: 179 K/UL — SIGNIFICANT CHANGE UP (ref 150–400)
POTASSIUM SERPL-MCNC: 4 MMOL/L — SIGNIFICANT CHANGE UP (ref 3.5–5.3)
POTASSIUM SERPL-SCNC: 4 MMOL/L — SIGNIFICANT CHANGE UP (ref 3.5–5.3)
RBC # BLD: 4.32 M/UL — SIGNIFICANT CHANGE UP (ref 3.8–5.2)
RBC # FLD: 13.8 % — SIGNIFICANT CHANGE UP (ref 10.3–16.9)
SODIUM SERPL-SCNC: 142 MMOL/L — SIGNIFICANT CHANGE UP (ref 135–145)
WBC # BLD: 6.6 K/UL — SIGNIFICANT CHANGE UP (ref 3.8–10.5)
WBC # FLD AUTO: 6.6 K/UL — SIGNIFICANT CHANGE UP (ref 3.8–10.5)

## 2018-12-10 PROCEDURE — 96365 THER/PROPH/DIAG IV INF INIT: CPT

## 2018-12-10 PROCEDURE — 87486 CHLMYD PNEUM DNA AMP PROBE: CPT

## 2018-12-10 PROCEDURE — 36415 COLL VENOUS BLD VENIPUNCTURE: CPT

## 2018-12-10 PROCEDURE — 87798 DETECT AGENT NOS DNA AMP: CPT

## 2018-12-10 PROCEDURE — 80053 COMPREHEN METABOLIC PANEL: CPT

## 2018-12-10 PROCEDURE — 87581 M.PNEUMON DNA AMP PROBE: CPT

## 2018-12-10 PROCEDURE — 99285 EMERGENCY DEPT VISIT HI MDM: CPT | Mod: 25

## 2018-12-10 PROCEDURE — 83605 ASSAY OF LACTIC ACID: CPT

## 2018-12-10 PROCEDURE — 99239 HOSP IP/OBS DSCHRG MGMT >30: CPT

## 2018-12-10 PROCEDURE — 80048 BASIC METABOLIC PNL TOTAL CA: CPT

## 2018-12-10 PROCEDURE — 85025 COMPLETE CBC W/AUTO DIFF WBC: CPT

## 2018-12-10 PROCEDURE — 87633 RESP VIRUS 12-25 TARGETS: CPT

## 2018-12-10 PROCEDURE — 83735 ASSAY OF MAGNESIUM: CPT

## 2018-12-10 PROCEDURE — 87040 BLOOD CULTURE FOR BACTERIA: CPT

## 2018-12-10 PROCEDURE — 96375 TX/PRO/DX INJ NEW DRUG ADDON: CPT

## 2018-12-10 PROCEDURE — 71046 X-RAY EXAM CHEST 2 VIEWS: CPT

## 2018-12-10 RX ORDER — AZITHROMYCIN 500 MG/1
1 TABLET, FILM COATED ORAL
Qty: 2 | Refills: 0
Start: 2018-12-10 | End: 2018-12-11

## 2018-12-10 RX ORDER — CEFPODOXIME PROXETIL 100 MG
1 TABLET ORAL
Qty: 14 | Refills: 0
Start: 2018-12-10 | End: 2018-12-16

## 2018-12-10 RX ORDER — ACETAMINOPHEN 500 MG
1000 TABLET ORAL ONCE
Qty: 0 | Refills: 0 | Status: COMPLETED | OUTPATIENT
Start: 2018-12-10 | End: 2018-12-10

## 2018-12-10 RX ORDER — LANOLIN ALCOHOL/MO/W.PET/CERES
5 CREAM (GRAM) TOPICAL AT BEDTIME
Qty: 0 | Refills: 0 | Status: DISCONTINUED | OUTPATIENT
Start: 2018-12-10 | End: 2018-12-10

## 2018-12-10 RX ADMIN — FAMOTIDINE 20 MILLIGRAM(S): 10 INJECTION INTRAVENOUS at 06:30

## 2018-12-10 RX ADMIN — Medication 1000 MILLIGRAM(S): at 03:37

## 2018-12-10 RX ADMIN — Medication 5 MILLIGRAM(S): at 03:23

## 2018-12-10 RX ADMIN — Medication 400 MILLIGRAM(S): at 03:22

## 2018-12-10 RX ADMIN — Medication 100 MILLIGRAM(S): at 08:53

## 2018-12-10 RX ADMIN — AZITHROMYCIN 500 MILLIGRAM(S): 500 TABLET, FILM COATED ORAL at 12:04

## 2018-12-10 RX ADMIN — Medication 30 MILLILITER(S): at 14:30

## 2018-12-10 RX ADMIN — AMLODIPINE BESYLATE 5 MILLIGRAM(S): 2.5 TABLET ORAL at 06:30

## 2018-12-10 RX ADMIN — CEFTRIAXONE 100 GRAM(S): 500 INJECTION, POWDER, FOR SOLUTION INTRAMUSCULAR; INTRAVENOUS at 12:04

## 2018-12-10 RX ADMIN — Medication 81 MILLIGRAM(S): at 12:04

## 2018-12-10 RX ADMIN — DULOXETINE HYDROCHLORIDE 60 MILLIGRAM(S): 30 CAPSULE, DELAYED RELEASE ORAL at 12:04

## 2018-12-10 RX ADMIN — Medication 100 MILLIGRAM(S): at 14:30

## 2018-12-10 RX ADMIN — ATENOLOL 50 MILLIGRAM(S): 25 TABLET ORAL at 06:30

## 2018-12-10 NOTE — DISCHARGE NOTE ADULT - ADDITIONAL INSTRUCTIONS
Please follow up with your primary care doctor, Dr. Stevens on Jan 4 @ 3:20 pm. His office may contact you to move this appt sooner. Please take abx as prescribed. Please follow up with your primary care doctor, Dr. Stevens on Jan 4 @ 3:20 pm. His office may contact you to move this appt sooner. Please take abx as follows: Cefpodoxime 200mg twice a day for 7 days. Azithromycin once a day for 2 days.

## 2018-12-10 NOTE — DISCHARGE NOTE ADULT - PLAN OF CARE
Resolution of your infection You were admitted to the hospital for treatment of pneumonia (lung infection) which you were found to have on imaging studies. You were treated with IV antibiotics. Please continue to take the prescribed antibiotics and follow up with your primary care doctor. We also recommend a repeat CT of your chest in 3 months to ensure resolution of this. Maintenance of heart health You came to the hospital with a history of coronary artery disease with a stent in the past. Please continue to take your home medications and follow up with your primary doctor. Maintenance of healthy blood pressure You came to the hospital with a history of high blood pressure. Please continue to take your home medications and follow up with your primary doctor. Maintenance of healthy gut You came to the hospital with a history of diverticulosis. Please continue to take your home medications and follow up with your primary doctor.

## 2018-12-10 NOTE — DISCHARGE NOTE ADULT - CARE PLAN
Principal Discharge DX:	Pneumonia of left lung due to infectious organism, unspecified part of lung  Goal:	Resolution of your infection  Assessment and plan of treatment:	You were admitted to the hospital for treatment of pneumonia (lung infection) which you were found to have on imaging studies. You were treated with IV antibiotics. Please continue to take the prescribed antibiotics and follow up with your primary care doctor. We also recommend a repeat CT of your chest in 3 months to ensure resolution of this.  Secondary Diagnosis:	History of heart artery stent  Goal:	Maintenance of heart health  Assessment and plan of treatment:	You came to the hospital with a history of coronary artery disease with a stent in the past. Please continue to take your home medications and follow up with your primary doctor.  Secondary Diagnosis:	Essential hypertension  Goal:	Maintenance of healthy blood pressure  Assessment and plan of treatment:	You came to the hospital with a history of high blood pressure. Please continue to take your home medications and follow up with your primary doctor.  Secondary Diagnosis:	Diverticulitis of colon  Goal:	Maintenance of healthy gut  Assessment and plan of treatment:	You came to the hospital with a history of diverticulosis. Please continue to take your home medications and follow up with your primary doctor.

## 2018-12-10 NOTE — DISCHARGE NOTE ADULT - PATIENT PORTAL LINK FT
You can access the [facility] Patient Portal, offered by [facility], by registering with the following website: [email or web address]

## 2018-12-10 NOTE — DISCHARGE NOTE ADULT - CARE PROVIDER_API CALL
Wiley Stevens), Cardiovascular Disease; Internal Medicine  [address] [place] [id number]  Phone: [phone]  Fax: [phone]

## 2018-12-10 NOTE — DISCHARGE NOTE ADULT - MEDICATION SUMMARY - MEDICATIONS TO STOP TAKING
I will STOP taking the medications listed below when I get home from the hospital:    Flagyl 500 mg oral tablet  -- 1 tab(s) by mouth every 8 hours    Cipro 500 mg oral tablet  -- 1 tab(s) by mouth every 12 hours

## 2018-12-10 NOTE — DISCHARGE NOTE ADULT - HOSPITAL COURSE
78F PMhx of CAD, HTN, depression, and diverticulosis who presented to [facility] on 12/9/18 with worsening cough productive of yellowish sputum x2 weeks. Pt had previously had outpatient trials of Levaquin (to which pt developed a rash and discontinued after 2 doses) and augmentin for 2 days and medrol dose pack.  In ED pt T 97.8, HR 75, /85, R 18, SpO2 96%RA. Labs s/f leukocytosis  (12.8) . CXR showed L basilar infiltrate. Pt given ceftriaxone, azithromycin, and 1L NS.  Pt admitted to [facility] for further management. Pt maintained on CTX and azithromycin. Pt remained afebrile, hemodynamically stable. Leukocytosis downtrended. RVP negative. Bcx negative. Urine legionella pending. Pt currently stable for d/c home on PO abx with outpatient follow up.

## 2018-12-10 NOTE — DISCHARGE NOTE ADULT - MEDICATION SUMMARY - MEDICATIONS TO TAKE
I will START or STAY ON the medications listed below when I get home from the hospital:    Aspirin Enteric Coated 81 mg oral delayed release tablet  -- 1 tab(s) by mouth once a day  -- Indication: For Coronary artery disease    Cymbalta 60 mg oral delayed release capsule  -- 1 cap(s) by mouth once a day  -- Indication: For Depression    Crestor 5 mg oral tablet  -- 1 tab(s) by mouth once a day (at bedtime)  -- Indication: For Coronary artery disease    benzonatate 100 mg oral capsule  -- 1 cap(s) by mouth 3 times a day, As needed, Cough  -- Indication: For Cough    atenolol 50 mg oral tablet  -- 1 tab(s) by mouth once a day  -- Indication: For High blood pressure    amLODIPine 5 mg oral tablet  -- 1 tab(s) by mouth once a day  -- Indication: For High blood pressure    cefpodoxime 200 mg oral tablet  -- 1 tab(s) by mouth 2 times a day   -- Finish all this medication unless otherwise directed by prescriber.  Take with food or milk.    -- Indication: For Pneumonia    azithromycin 250 mg oral tablet  -- 1 tab(s) by mouth once a day   -- Do not take dairy products, antacids, or iron preparations within one hour of this medication.  Finish all this medication unless otherwise directed by prescriber.    -- Indication: For Pneumonia

## 2018-12-11 RX ORDER — ALBUTEROL SULFATE 90 UG/1
108 (90 BASE) AEROSOL, METERED RESPIRATORY (INHALATION)
Qty: 1 | Refills: 2 | Status: COMPLETED | COMMUNITY
Start: 2018-12-07 | End: 2018-12-11

## 2018-12-11 RX ORDER — FLUTICASONE PROPIONATE 50 UG/1
50 SPRAY, METERED NASAL
Qty: 1 | Refills: 2 | Status: COMPLETED | COMMUNITY
Start: 2018-01-08 | End: 2018-12-11

## 2018-12-11 RX ORDER — AMOXICILLIN AND CLAVULANATE POTASSIUM 875; 125 MG/1; MG/1
875-125 TABLET, COATED ORAL TWICE DAILY
Qty: 28 | Refills: 0 | Status: COMPLETED | COMMUNITY
Start: 2018-12-05 | End: 2018-12-11

## 2018-12-11 RX ORDER — METHYLPREDNISOLONE 4 MG/1
4 TABLET ORAL
Qty: 1 | Refills: 0 | Status: COMPLETED | COMMUNITY
Start: 2018-12-07 | End: 2018-12-11

## 2018-12-12 ENCOUNTER — APPOINTMENT (OUTPATIENT)
Dept: PULMONOLOGY | Facility: CLINIC | Age: 78
End: 2018-12-12
Payer: MEDICARE

## 2018-12-12 VITALS
HEIGHT: 63 IN | SYSTOLIC BLOOD PRESSURE: 100 MMHG | TEMPERATURE: 97.1 F | WEIGHT: 179 LBS | DIASTOLIC BLOOD PRESSURE: 80 MMHG | OXYGEN SATURATION: 97 % | BODY MASS INDEX: 31.71 KG/M2 | HEART RATE: 72 BPM

## 2018-12-12 DIAGNOSIS — Z95.5 PRESENCE OF CORONARY ANGIOPLASTY IMPLANT AND GRAFT: ICD-10-CM

## 2018-12-12 DIAGNOSIS — I10 ESSENTIAL (PRIMARY) HYPERTENSION: ICD-10-CM

## 2018-12-12 DIAGNOSIS — I25.10 ATHEROSCLEROTIC HEART DISEASE OF NATIVE CORONARY ARTERY WITHOUT ANGINA PECTORIS: ICD-10-CM

## 2018-12-12 DIAGNOSIS — Z87.891 PERSONAL HISTORY OF NICOTINE DEPENDENCE: ICD-10-CM

## 2018-12-12 DIAGNOSIS — K57.90 DIVERTICULOSIS OF INTESTINE, PART UNSPECIFIED, WITHOUT PERFORATION OR ABSCESS WITHOUT BLEEDING: ICD-10-CM

## 2018-12-12 DIAGNOSIS — Z85.3 PERSONAL HISTORY OF MALIGNANT NEOPLASM OF BREAST: ICD-10-CM

## 2018-12-12 DIAGNOSIS — J18.9 PNEUMONIA, UNSPECIFIED ORGANISM: ICD-10-CM

## 2018-12-12 DIAGNOSIS — F32.9 MAJOR DEPRESSIVE DISORDER, SINGLE EPISODE, UNSPECIFIED: ICD-10-CM

## 2018-12-12 PROCEDURE — 94010 BREATHING CAPACITY TEST: CPT

## 2018-12-12 PROCEDURE — 99204 OFFICE O/P NEW MOD 45 MIN: CPT | Mod: 25

## 2018-12-14 ENCOUNTER — APPOINTMENT (OUTPATIENT)
Dept: HEART AND VASCULAR | Facility: CLINIC | Age: 78
End: 2018-12-14
Payer: MEDICARE

## 2018-12-14 VITALS
SYSTOLIC BLOOD PRESSURE: 124 MMHG | RESPIRATION RATE: 14 BRPM | DIASTOLIC BLOOD PRESSURE: 70 MMHG | WEIGHT: 176.01 LBS | BODY MASS INDEX: 31.19 KG/M2 | HEIGHT: 63 IN | TEMPERATURE: 97.4 F | HEART RATE: 73 BPM | OXYGEN SATURATION: 96 %

## 2018-12-14 LAB
CULTURE RESULTS: SIGNIFICANT CHANGE UP
CULTURE RESULTS: SIGNIFICANT CHANGE UP
SPECIMEN SOURCE: SIGNIFICANT CHANGE UP
SPECIMEN SOURCE: SIGNIFICANT CHANGE UP

## 2018-12-14 PROCEDURE — 99214 OFFICE O/P EST MOD 30 MIN: CPT

## 2018-12-14 RX ORDER — RANITIDINE HYDROCHLORIDE 150 MG/1
150 CAPSULE ORAL
Refills: 0 | Status: DISCONTINUED | COMMUNITY
End: 2018-12-14

## 2018-12-14 RX ORDER — AZITHROMYCIN 250 MG/1
250 TABLET, FILM COATED ORAL DAILY
Refills: 0 | Status: DISCONTINUED | COMMUNITY
Start: 2018-12-11 | End: 2018-12-14

## 2018-12-14 NOTE — PHYSICAL EXAM
[General Appearance - Well Developed] : well developed [Normal Appearance] : normal appearance [Well Groomed] : well groomed [General Appearance - Well Nourished] : well nourished [No Deformities] : no deformities [General Appearance - In No Acute Distress] : no acute distress [Normal Conjunctiva] : the conjunctiva exhibited no abnormalities [] : no respiratory distress [Respiration, Rhythm And Depth] : normal respiratory rhythm and effort [Exaggerated Use Of Accessory Muscles For Inspiration] : no accessory muscle use [Heart Rate And Rhythm] : heart rate and rhythm were normal [Heart Sounds] : normal S1 and S2 [Abdomen Soft] : soft [Abnormal Walk] : normal gait [FreeTextEntry1] : no edema [Skin Turgor] : normal skin turgor [Oriented To Time, Place, And Person] : oriented to person, place, and time [Affect] : the affect was normal [Mood] : the mood was normal [No Anxiety] : not feeling anxious

## 2018-12-14 NOTE — HISTORY OF PRESENT ILLNESS
[FreeTextEntry1] : 78 year female who notes having gotten one Pneumovax in the past but not in the last 5 years. She got the Flu Vax at [facility] this year

## 2018-12-14 NOTE — DISCUSSION/SUMMARY
[FreeTextEntry1] : Community Acquired Pneumonia--Prevnar 13 in 2 weeks then Pneumovax 23 in one year. We discussed Shingrix. Avoid over-exertion and situations where she is likely to be exposed to others who are sick. 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

## 2018-12-20 NOTE — PATIENT PROFILE ADULT - VISION (WITH CORRECTIVE LENSES IF THE PATIENT USUALLY WEARS THEM):
Rx for Doxycycline sent. Pt informed of medication instructions and LSS reccs. Voiced understanding. No further questions. Normal vision: sees adequately in most situations; can see medication labels, newsprint

## 2018-12-27 ENCOUNTER — APPOINTMENT (OUTPATIENT)
Dept: HEART AND VASCULAR | Facility: CLINIC | Age: 78
End: 2018-12-27
Payer: MEDICARE

## 2018-12-27 VITALS
HEART RATE: 68 BPM | OXYGEN SATURATION: 97 % | WEIGHT: 176 LBS | BODY MASS INDEX: 31.18 KG/M2 | HEIGHT: 63 IN | TEMPERATURE: 97.4 F | SYSTOLIC BLOOD PRESSURE: 128 MMHG | RESPIRATION RATE: 14 BRPM | DIASTOLIC BLOOD PRESSURE: 84 MMHG

## 2018-12-27 PROCEDURE — G0009: CPT

## 2018-12-27 PROCEDURE — 99214 OFFICE O/P EST MOD 30 MIN: CPT | Mod: 25

## 2018-12-27 PROCEDURE — 90670 PCV13 VACCINE IM: CPT

## 2018-12-27 NOTE — PHYSICAL EXAM
[General Appearance - Well Developed] : well developed [Normal Appearance] : normal appearance [Well Groomed] : well groomed [General Appearance - Well Nourished] : well nourished [No Deformities] : no deformities [General Appearance - In No Acute Distress] : no acute distress [Normal Conjunctiva] : the conjunctiva exhibited no abnormalities [] : no respiratory distress [Respiration, Rhythm And Depth] : normal respiratory rhythm and effort [Exaggerated Use Of Accessory Muscles For Inspiration] : no accessory muscle use [Auscultation Breath Sounds / Voice Sounds] : lungs were clear to auscultation bilaterally [Heart Sounds] : normal S1 and S2 [Abnormal Walk] : normal gait [Skin Turgor] : normal skin turgor [Oriented To Time, Place, And Person] : oriented to person, place, and time [Affect] : the affect was normal [Mood] : the mood was normal [No Anxiety] : not feeling anxious [FreeTextEntry1] : concha

## 2018-12-27 NOTE — HISTORY OF PRESENT ILLNESS
[FreeTextEntry1] : 78 year female who feels significantly improved. She still has an intermittent dry cough. No fever

## 2018-12-27 NOTE — DISCUSSION/SUMMARY
[FreeTextEntry1] : Pneumovax--OK to socialize but hold on volunteering at [facility]. 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 Prevnar-13 administered. Followup in one month for Echo and Carotid doppler

## 2019-01-05 ENCOUNTER — RX RENEWAL (OUTPATIENT)
Age: 79
End: 2019-01-05

## 2019-01-08 ENCOUNTER — RX RENEWAL (OUTPATIENT)
Age: 79
End: 2019-01-08

## 2019-01-24 ENCOUNTER — APPOINTMENT (OUTPATIENT)
Dept: HEART AND VASCULAR | Facility: CLINIC | Age: 79
End: 2019-01-24
Payer: MEDICARE

## 2019-01-24 VITALS
HEIGHT: 63 IN | HEART RATE: 66 BPM | OXYGEN SATURATION: 95 % | TEMPERATURE: 98 F | BODY MASS INDEX: 31.18 KG/M2 | SYSTOLIC BLOOD PRESSURE: 110 MMHG | WEIGHT: 176 LBS | DIASTOLIC BLOOD PRESSURE: 70 MMHG

## 2019-01-24 PROCEDURE — 99214 OFFICE O/P EST MOD 30 MIN: CPT

## 2019-01-24 NOTE — DISCUSSION/SUMMARY
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 I asked her to return for an Echocardiogram and Carotid Doppler

## 2019-01-24 NOTE — HISTORY OF PRESENT ILLNESS
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 Her cough is 2-3 times a day without sputum. No fever

## 2019-02-14 ENCOUNTER — APPOINTMENT (OUTPATIENT)
Dept: HEART AND VASCULAR | Facility: CLINIC | Age: 79
End: 2019-02-14
Payer: MEDICARE

## 2019-02-14 VITALS
HEART RATE: 64 BPM | OXYGEN SATURATION: 97 % | DIASTOLIC BLOOD PRESSURE: 80 MMHG | HEIGHT: 63 IN | TEMPERATURE: 97.6 F | WEIGHT: 178 LBS | RESPIRATION RATE: 14 BRPM | BODY MASS INDEX: 31.54 KG/M2 | SYSTOLIC BLOOD PRESSURE: 144 MMHG

## 2019-02-14 PROCEDURE — 99214 OFFICE O/P EST MOD 30 MIN: CPT

## 2019-02-14 PROCEDURE — 93880 EXTRACRANIAL BILAT STUDY: CPT

## 2019-02-14 PROCEDURE — 93306 TTE W/DOPPLER COMPLETE: CPT

## 2019-02-14 NOTE — HISTORY OF PRESENT ILLNESS
[FreeTextEntry1] : 78 year female who feels back to her baseline and ready to return to volunteering at [employer]. She notes fatigue, taking Atenolol 50 mg bid

## 2019-02-14 NOTE — DISCUSSION/SUMMARY
[FreeTextEntry1] : At the time of the patient's visit an Echocardiogram was performed to evaluate[]. At the time of the visit the results were reviewed with patient \par \par At the time of the patient's visit a Carotid Doppler was performed to evaluate[]. At the time of the visit the results were reviewed with patient \par \par Hyperdynamic LV, hx of thyroid nodule, lung nodule, hypertension--I asked her to increase her Atenolol to 25 mg in AM and 100 mg at bedtime. See Dr Martin in followup for thyroid sonogram. Shingrix at pharm. CT of chest to followup nodule at [facility]

## 2019-03-05 ENCOUNTER — FORM ENCOUNTER (OUTPATIENT)
Age: 79
End: 2019-03-05

## 2019-03-06 ENCOUNTER — OUTPATIENT (OUTPATIENT)
Dept: OUTPATIENT SERVICES | Facility: HOSPITAL | Age: 79
LOS: 1 days | End: 2019-03-06
Payer: MEDICARE

## 2019-03-06 ENCOUNTER — APPOINTMENT (OUTPATIENT)
Dept: CT IMAGING | Facility: HOSPITAL | Age: 79
End: 2019-03-06
Payer: MEDICARE

## 2019-03-06 DIAGNOSIS — Z90.12 ACQUIRED ABSENCE OF LEFT BREAST AND NIPPLE: Chronic | ICD-10-CM

## 2019-03-06 DIAGNOSIS — Z95.5 PRESENCE OF CORONARY ANGIOPLASTY IMPLANT AND GRAFT: Chronic | ICD-10-CM

## 2019-03-06 PROCEDURE — 71250 CT THORAX DX C-: CPT

## 2019-03-06 PROCEDURE — 71250 CT THORAX DX C-: CPT | Mod: 26

## 2019-03-13 ENCOUNTER — APPOINTMENT (OUTPATIENT)
Dept: PULMONOLOGY | Facility: CLINIC | Age: 79
End: 2019-03-13
Payer: MEDICARE

## 2019-03-13 VITALS
SYSTOLIC BLOOD PRESSURE: 118 MMHG | HEIGHT: 63 IN | HEART RATE: 68 BPM | OXYGEN SATURATION: 97 % | WEIGHT: 178 LBS | TEMPERATURE: 95.8 F | BODY MASS INDEX: 31.54 KG/M2 | DIASTOLIC BLOOD PRESSURE: 76 MMHG

## 2019-03-13 PROCEDURE — 99213 OFFICE O/P EST LOW 20 MIN: CPT | Mod: 25

## 2019-03-13 PROCEDURE — 94010 BREATHING CAPACITY TEST: CPT

## 2019-03-13 NOTE — HISTORY OF PRESENT ILLNESS
[FreeTextEntry1] : 12/12/18: Asked to evaluate patient by Dr Stevens for abnormal CT chest. He was treating her for coughing and wheezing. Initially gave Levaquin but caused rash, then off a few days, got CT, but then given Augmentin (12/5)  and medrol pack last week. Over weekend she was briefly admitted at [facility] and changed to cefpodoxime and azithro which she is on now but with stomach upset. Feels weak. Coughing, but improved. No fever. Not dyspneic but some chest discomfort. No history of lung disease. Former minimal smoker.\par \par 3/13/19: Feels markedly better than when I saw her last. No real residual dyspnea or cough, but does remain more fatigued than her baseline. Has in general not been very active, but now is starting to get back to her usual schedule, for instance is about to return to volunteering at [facility].

## 2019-03-13 NOTE — PHYSICAL EXAM
[General Appearance - In No Acute Distress] : no acute distress [Heart Rate And Rhythm] : heart rate and rhythm were normal [Heart Sounds] : normal S1 and S2 [Murmurs] : no murmurs present [Edema] : no peripheral edema present [Auscultation Breath Sounds / Voice Sounds] : lungs were clear to auscultation bilaterally

## 2019-03-13 NOTE — ASSESSMENT
[FreeTextEntry1] : Data reviewed:\par \par CT chest [facility] 3/6/19 personally reviewed c/w 11/30/18: resolution of the LLL changes\par \par Leonidas 12/12/18: restricted\par Leonidas 3/13/19: normal\par \par Impression:\par CAP, resolved\par \par Plan:\par She has clinically improved and radiographically resolved. She is advised to gradually work up to usual activities. Discussed the varying tempo of recovery from lower respiratory tract infection. No need to follow up again, but glad to see for any new problems or concerns.\par

## 2019-03-20 ENCOUNTER — APPOINTMENT (OUTPATIENT)
Dept: HEART AND VASCULAR | Facility: CLINIC | Age: 79
End: 2019-03-20
Payer: MEDICARE

## 2019-03-20 VITALS
BODY MASS INDEX: 31.54 KG/M2 | HEIGHT: 63 IN | HEART RATE: 72 BPM | TEMPERATURE: 97.7 F | RESPIRATION RATE: 14 BRPM | SYSTOLIC BLOOD PRESSURE: 120 MMHG | WEIGHT: 178 LBS | OXYGEN SATURATION: 97 % | DIASTOLIC BLOOD PRESSURE: 76 MMHG

## 2019-03-20 PROCEDURE — 99214 OFFICE O/P EST MOD 30 MIN: CPT

## 2019-03-20 RX ORDER — ATENOLOL 25 MG/1
25 TABLET ORAL
Qty: 30 | Refills: 0 | Status: DISCONTINUED | COMMUNITY
End: 2019-03-20

## 2019-03-20 NOTE — HISTORY OF PRESENT ILLNESS
[FreeTextEntry1] : 78 year female who feels more fatigue on the higher dose of Atenolol. She notes sleeping better at night. She has sleep problems and sleeps during day

## 2019-03-20 NOTE — PHYSICAL EXAM
[General Appearance - Well Developed] : well developed [Well Groomed] : well groomed [Normal Appearance] : normal appearance [No Deformities] : no deformities [General Appearance - Well Nourished] : well nourished [Normal Conjunctiva] : the conjunctiva exhibited no abnormalities [General Appearance - In No Acute Distress] : no acute distress [Respiration, Rhythm And Depth] : normal respiratory rhythm and effort [] : no respiratory distress [Exaggerated Use Of Accessory Muscles For Inspiration] : no accessory muscle use [Affect] : the affect was normal [Oriented To Time, Place, And Person] : oriented to person, place, and time [Abnormal Walk] : normal gait [No Anxiety] : not feeling anxious [Mood] : the mood was normal

## 2019-04-01 ENCOUNTER — APPOINTMENT (OUTPATIENT)
Dept: GASTROENTEROLOGY | Facility: CLINIC | Age: 79
End: 2019-04-01
Payer: MEDICARE

## 2019-04-01 VITALS
BODY MASS INDEX: 31.89 KG/M2 | SYSTOLIC BLOOD PRESSURE: 126 MMHG | OXYGEN SATURATION: 97 % | RESPIRATION RATE: 14 BRPM | WEIGHT: 180 LBS | HEIGHT: 63 IN | DIASTOLIC BLOOD PRESSURE: 75 MMHG | HEART RATE: 76 BPM

## 2019-04-01 PROCEDURE — 99214 OFFICE O/P EST MOD 30 MIN: CPT

## 2019-04-01 RX ORDER — SENNOSIDES 8.6 MG
TABLET ORAL
Refills: 0 | Status: ACTIVE | COMMUNITY

## 2019-04-01 RX ORDER — CEFPODOXIME PROXETIL 200 MG/1
200 TABLET, FILM COATED ORAL
Refills: 0 | Status: DISCONTINUED | COMMUNITY
Start: 2018-12-11 | End: 2019-04-01

## 2019-04-02 NOTE — HISTORY OF PRESENT ILLNESS
[___ Month(s) Ago] : [unfilled] month(s) ago [Heartburn] : improved heartburn [Nausea] : stable nausea [Vomiting] : improved vomiting [Diarrhea] : improved diarrhea [Yellow Skin Or Eyes (Jaundice)] : denies jaundice [Abdominal Pain] : improved abdominal pain [Abdominal Swelling] : denies abdominal swelling [Rectal Pain] : denies rectal pain [Wt Loss ___ Lbs] : recent [unfilled] ~Upound(s) weight loss [Constipation] : constipation [GERD] : gastroesophageal reflux disease [_________] : Performed [unfilled] [Wt Gain ___ Lbs] : no recent weight gain [de-identified] : 78yr old F with PMHx significant for IBS, GERD, and Diverticulosis - with recurrent diverticulitis, who presents for follow up of constipation/diverticulosis.\par \par 4/1/19\par -Pt states she is feeling about the same since last visit. Constipation is still a big problem, BM once every 2 days and feels incomplete. She will get bad cramps from her diverticulosis that cause nausea at times and leads to regurgitation. Episodes of nausea last only a few minutes. IBgard has been helping her stomach settle and with gas. She is no longer on a gluten free diet because she does not believe it helped her. Zantac is helping with heartburn. Denies any other GI symptoms. \par \par Previous history:\par 78 yr old F with PMHx significant for IBS, GERD, and Diverticulosis - with recurrent diverticulitis, who presents for evaluation of constipation/differentiation of abd pain.\par Has been having increased constipation - and reportedly goes every 2-3days, and this is associated with some R sided abdominal pain, relieved after BM. Saw Dr Alvares, non operative mgmt, benefiber suggested. IBGARD helps. Her bowel movements are the most part regular, there is no rectal bleeding, nausea or vomiting.\par \par She's come to realize it to have good days and bad days and overall seems in good spirits.\par \par She multiple polyps on her last colonoscopy will require followup and 2020 [de-identified] : multiple small polyps [FreeTextEntry1] : Here for followup of irritable bowel syndrome and esophageal reflux, Diverticulitis. Since her last visit, she has attempted to adhere to a low sugar diet and for the most part a gluten-free diet. Globally, she feels somewhat better but continues to have bad days with cramping abdominal pain and a soreness that persists despite positional change when sleeping. She has no alarm symptomatology,  she is gaining weight because she is always hungry. Her bowel movements are the most part regular, there is no rectal bleeding, nausea or vomiting.\par \par She's come to realize it to have good days and bad days and overall seems in good spirits.\par \par Multiple polyps on her last colonoscopy will require followup and 2020\par \par \par

## 2019-04-02 NOTE — PHYSICAL EXAM
[General Appearance - Alert] : alert [General Appearance - In No Acute Distress] : in no acute distress [Auscultation Breath Sounds / Voice Sounds] : lungs were clear to auscultation bilaterally [Heart Rate And Rhythm] : heart rate was normal and rhythm regular [Heart Sounds] : normal S1 and S2 [Bowel Sounds] : normal bowel sounds [Abdomen Soft] : soft [Abdomen Tenderness] : non-tender [Oriented To Time, Place, And Person] : oriented to person, place, and time [Affect] : the affect was normal [Sclera] : the sclera and conjunctiva were normal [Outer Ear] : the ears and nose were normal in appearance [Oropharynx] : the oropharynx was normal [Neck Appearance] : the appearance of the neck was normal [] : the neck was supple [Edema] : there was no peripheral edema [Abnormal Walk] : normal gait [Skin Color & Pigmentation] : normal skin color and pigmentation [Skin Turgor] : normal skin turgor

## 2019-04-02 NOTE — ASSESSMENT
[FreeTextEntry1] : 78yr old F with PMHx significant for IBS, GERD, and Diverticulosis - with recurrent diverticulitis, who presents for follow up of constipation/diverticulosis.\par \par Constipation - \par - encouraged patient to take  MiraLAX daily alt with Benefiber supplement\par - increase fluid intake and encourage activity\par \par IBS - C -\par - Irritable bowel syndrome, overall somewhat improved. \par - continue with IB-Lucila\par -Gave information about Atrantil as alternative to IB-Lucila \par \par Diverticulosis -\par - conservative mgmt\par \par Esophageal reflux, stable.\par -continue Zantac 150 mg daily, take it morning or evening depending on symptomatology and when is worse. \par \par HCM\par -Due for next colonoscopy February 2020 \par \par F/U 6 months \par \par \par

## 2019-05-06 ENCOUNTER — RX RENEWAL (OUTPATIENT)
Age: 79
End: 2019-05-06

## 2019-05-16 ENCOUNTER — APPOINTMENT (OUTPATIENT)
Dept: ENDOCRINOLOGY | Facility: CLINIC | Age: 79
End: 2019-05-16
Payer: MEDICARE

## 2019-05-16 VITALS
SYSTOLIC BLOOD PRESSURE: 137 MMHG | BODY MASS INDEX: 32.24 KG/M2 | HEART RATE: 79 BPM | DIASTOLIC BLOOD PRESSURE: 79 MMHG | WEIGHT: 182 LBS

## 2019-05-16 DIAGNOSIS — E04.1 NONTOXIC SINGLE THYROID NODULE: ICD-10-CM

## 2019-05-16 PROCEDURE — 76536 US EXAM OF HEAD AND NECK: CPT

## 2019-05-16 PROCEDURE — 99213 OFFICE O/P EST LOW 20 MIN: CPT | Mod: 25

## 2019-05-17 NOTE — CONSULT LETTER
[Dear  ___] : Dear  [unfilled], [Consult Letter:] : I had the pleasure of evaluating your patient, [unfilled]. [Please see my note below.] : Please see my note below. [Sincerely,] : Sincerely, [Consult Closing:] : Thank you very much for allowing me to participate in the care of this patient.  If you have any questions, please do not hesitate to contact me. [FreeTextEntry3] : Shar Martin MD\par Cell: [phone]

## 2019-05-17 NOTE — PHYSICAL EXAM
[Alert] : alert [No Acute Distress] : no acute distress [Well Nourished] : well nourished [Well Developed] : well developed [Normal Sclera/Conjunctiva] : normal sclera/conjunctiva [EOMI] : extra ocular movement intact [Visual Fields Grossly Intact] : visual fields grossly intact [No Proptosis] : no proptosis [Normal Oropharynx] : the oropharynx was normal [No Thyroid Nodules] : there were no palpable thyroid nodules [No LAD] : no lymphadenopathy [Thyroid Not Enlarged] : the thyroid was not enlarged [No Accessory Muscle Use] : no accessory muscle use [No Respiratory Distress] : no respiratory distress [Normal Rate] : heart rate was normal  [Clear to Auscultation] : lungs were clear to auscultation bilaterally [Pedal Pulses Normal] : the pedal pulses are present [Regular Rhythm] : with a regular rhythm [Normal S1, S2] : normal S1 and S2 [Normal Bowel Sounds] : normal bowel sounds [No Edema] : there was no peripheral edema [Not Tender] : non-tender [Soft] : abdomen soft [Not Distended] : not distended [Anterior Cervical Nodes] : anterior cervical nodes [Post Cervical Nodes] : posterior cervical nodes [Axillary Nodes] : axillary nodes [Normal] : normal and non tender [No Spinal Tenderness] : no spinal tenderness [Spine Straight] : spine straight [Normal Strength/Tone] : muscle strength and tone were normal [No Stigmata of Cushings Syndrome] : no stigmata of cushings syndrome [Normal Gait] : normal gait [Acanthosis Nigricans] : no acanthosis nigricans [No Rash] : no rash [Normal Reflexes] : deep tendon reflexes were 2+ and symmetric [No Tremors] : no tremors [Oriented x3] : oriented to person, place, and time

## 2019-05-17 NOTE — PROCEDURE
[FreeTextEntry1] : POC US of the neck: Images stored on local hard drive\par \par Comparison: 10/2017\par \par Indications: Incidentally found right thyroid nodule on carotid sonogram.\par \par Findings:\par \par The thyroid gland appears homogenous and w/o abnormal vascularity.\par \par Again is a right inferior lobe nodule, measuring 0.8 x 05 x 0.6 cm. It is hypoechoic and has regular, sharp borders. It exhibits Grade 1 vascularity and has no identifiable calcifications.

## 2019-05-17 NOTE — HISTORY OF PRESENT ILLNESS
[FreeTextEntry1] : 77 y/o F w/ Hx of remote breast Ca s/p lumpectomy on 96, on remission + CAD s/p elective stent placement. Here for evaluation of incidentally found thyroid nodule.\par \par 102/2017\par Pt generally feels well and endorses no acute complaints. She has a history of stable carotid artery plaque for which she undergoes routine carotid doppler evaluation. A right thyroid nodules was incidentally found. Pt reports no lumps, dysphagia or trouble breathing.  She denies any family history of thyroid cancer. She reports no past exposure to ionizing radiation.\par Otherwise she reports no f/c, CP, SOB, palpitations, tremors, anxiety, depression, weight changes, vision changes or heat/cold intolerance.\par \par 5/2019: Here for /fu, generally feels well and endorses no acute complaints. No interval events since LV. Today comes for surveillance of thyroid nodules. She otherwise denies any f/c, CP, SOB, palpitations, tremors, depressed mood, anxiety, palpitations, n/v, stool/urinary abn, skin/weight changes, heat/cold intolerance, HAs, breast/nipple changes, polyuria/polydipsia/nocturia or other complaints.\par she again denies any dysphagia, hoarseness, neck tenderness or new palpable masses. she again denies any family history of thyroid disorders or personal exposure to ionizing radiation.\par

## 2019-05-17 NOTE — ASSESSMENT
[FreeTextEntry1] : Thyroid nodules:\par Subcentimeter thyroid nodule w/ benign sonographic features was identified. Max diameter is 0.8 cm. No need for FNA biopsy at this time. We will repeat an US of the neck in 24 months or earlier as clinically indicated.

## 2019-05-29 ENCOUNTER — MEDICATION RENEWAL (OUTPATIENT)
Age: 79
End: 2019-05-29

## 2019-07-24 ENCOUNTER — CLINICAL ADVICE (OUTPATIENT)
Age: 79
End: 2019-07-24

## 2019-07-24 ENCOUNTER — MED ADMIN CHARGE (OUTPATIENT)
Age: 79
End: 2019-07-24

## 2019-08-01 ENCOUNTER — RX RENEWAL (OUTPATIENT)
Age: 79
End: 2019-08-01

## 2019-08-01 ENCOUNTER — APPOINTMENT (OUTPATIENT)
Dept: HEART AND VASCULAR | Facility: CLINIC | Age: 79
End: 2019-08-01
Payer: MEDICARE

## 2019-08-01 VITALS — DIASTOLIC BLOOD PRESSURE: 70 MMHG | SYSTOLIC BLOOD PRESSURE: 106 MMHG

## 2019-08-01 VITALS
WEIGHT: 180 LBS | RESPIRATION RATE: 14 BRPM | SYSTOLIC BLOOD PRESSURE: 100 MMHG | TEMPERATURE: 97.9 F | HEIGHT: 63 IN | BODY MASS INDEX: 31.89 KG/M2 | DIASTOLIC BLOOD PRESSURE: 70 MMHG | HEART RATE: 83 BPM | OXYGEN SATURATION: 97 %

## 2019-08-01 PROCEDURE — 99214 OFFICE O/P EST MOD 30 MIN: CPT

## 2019-08-01 PROCEDURE — 36415 COLL VENOUS BLD VENIPUNCTURE: CPT

## 2019-08-01 NOTE — PHYSICAL EXAM
[General Appearance - Well Developed] : well developed [Well Groomed] : well groomed [Normal Appearance] : normal appearance [General Appearance - Well Nourished] : well nourished [No Deformities] : no deformities [General Appearance - In No Acute Distress] : no acute distress [Normal Conjunctiva] : the conjunctiva exhibited no abnormalities [] : no respiratory distress [Exaggerated Use Of Accessory Muscles For Inspiration] : no accessory muscle use [Respiration, Rhythm And Depth] : normal respiratory rhythm and effort [Auscultation Breath Sounds / Voice Sounds] : lungs were clear to auscultation bilaterally [Heart Sounds] : normal S1 and S2 [Abnormal Walk] : normal gait [Skin Turgor] : normal skin turgor [Oriented To Time, Place, And Person] : oriented to person, place, and time [Affect] : the affect was normal [Mood] : the mood was normal [No Anxiety] : not feeling anxious

## 2019-08-01 NOTE — HISTORY OF PRESENT ILLNESS
[FreeTextEntry1] : 79 year female who saw Dr Lira of ENT, Dr Casanova of GI and Dr Kaleb Martin of Endo. She notes having Neuralgia and saw Dr Cottrell of Neurology who felt that it was a form of Migraine. She describes having had any MRI. She was told to stay off of extra-strength Tylenol. She prescribed Topiramate and is titrating it up. She had an episode of difficulty with memory. She is anticipates stopping Topiramate and is awaiting call back. She notes right chest discomfort

## 2019-08-02 LAB
ALBUMIN SERPL ELPH-MCNC: 4 G/DL
ALP BLD-CCNC: 84 U/L
ALT SERPL-CCNC: 14 U/L
ANION GAP SERPL CALC-SCNC: 11 MMOL/L
AST SERPL-CCNC: 9 U/L
BASOPHILS # BLD AUTO: 0.03 K/UL
BASOPHILS NFR BLD AUTO: 0.3 %
BILIRUB SERPL-MCNC: 0.3 MG/DL
BUN SERPL-MCNC: 20 MG/DL
CALCIUM SERPL-MCNC: 10.5 MG/DL
CHLORIDE SERPL-SCNC: 107 MMOL/L
CHOLEST SERPL-MCNC: 140 MG/DL
CHOLEST/HDLC SERPL: 3.3 RATIO
CO2 SERPL-SCNC: 28 MMOL/L
CREAT SERPL-MCNC: 0.83 MG/DL
EOSINOPHIL # BLD AUTO: 0.21 K/UL
EOSINOPHIL NFR BLD AUTO: 2.4 %
ESTIMATED AVERAGE GLUCOSE: 126 MG/DL
GLUCOSE SERPL-MCNC: 106 MG/DL
HBA1C MFR BLD HPLC: 6 %
HCT VFR BLD CALC: 44.1 %
HDLC SERPL-MCNC: 43 MG/DL
HGB BLD-MCNC: 13.3 G/DL
IMM GRANULOCYTES NFR BLD AUTO: 0.3 %
LDLC SERPL CALC-MCNC: 77 MG/DL
LYMPHOCYTES # BLD AUTO: 1.1 K/UL
LYMPHOCYTES NFR BLD AUTO: 12.6 %
MAN DIFF?: NORMAL
MCHC RBC-ENTMCNC: 29.4 PG
MCHC RBC-ENTMCNC: 30.2 GM/DL
MCV RBC AUTO: 97.6 FL
MONOCYTES # BLD AUTO: 0.61 K/UL
MONOCYTES NFR BLD AUTO: 7 %
NEUTROPHILS # BLD AUTO: 6.75 K/UL
NEUTROPHILS NFR BLD AUTO: 77.4 %
PLATELET # BLD AUTO: 259 K/UL
POTASSIUM SERPL-SCNC: 5.3 MMOL/L
PROT SERPL-MCNC: 6.7 G/DL
RBC # BLD: 4.52 M/UL
RBC # FLD: 13.8 %
SODIUM SERPL-SCNC: 146 MMOL/L
TRIGL SERPL-MCNC: 102 MG/DL
TSH SERPL-ACNC: 1.62 UIU/ML
WBC # FLD AUTO: 8.73 K/UL

## 2019-08-09 ENCOUNTER — LABORATORY RESULT (OUTPATIENT)
Age: 79
End: 2019-08-09

## 2019-08-09 ENCOUNTER — RX RENEWAL (OUTPATIENT)
Age: 79
End: 2019-08-09

## 2019-08-09 ENCOUNTER — APPOINTMENT (OUTPATIENT)
Dept: HEART AND VASCULAR | Facility: CLINIC | Age: 79
End: 2019-08-09
Payer: MEDICARE

## 2019-08-09 VITALS
WEIGHT: 182 LBS | OXYGEN SATURATION: 94 % | RESPIRATION RATE: 14 BRPM | HEART RATE: 86 BPM | DIASTOLIC BLOOD PRESSURE: 70 MMHG | BODY MASS INDEX: 32.25 KG/M2 | HEIGHT: 63 IN | TEMPERATURE: 98.4 F | SYSTOLIC BLOOD PRESSURE: 134 MMHG

## 2019-08-09 DIAGNOSIS — R30.0 DYSURIA: ICD-10-CM

## 2019-08-09 PROCEDURE — 93000 ELECTROCARDIOGRAM COMPLETE: CPT

## 2019-08-09 PROCEDURE — 36415 COLL VENOUS BLD VENIPUNCTURE: CPT

## 2019-08-09 PROCEDURE — 99214 OFFICE O/P EST MOD 30 MIN: CPT

## 2019-08-09 NOTE — REASON FOR VISIT
[Follow-Up - Clinic] : a clinic follow-up of [FreeTextEntry2] : sinus infection with post-nasal infection

## 2019-08-09 NOTE — DISCUSSION/SUMMARY
[FreeTextEntry1] : sinus infection, possible UTI--Bactrim, Flonase. CBC and urinalysis sent. If worsens to go to ER

## 2019-08-09 NOTE — PHYSICAL EXAM
[General Appearance - Well Developed] : well developed [Normal Appearance] : normal appearance [Well Groomed] : well groomed [General Appearance - Well Nourished] : well nourished [No Deformities] : no deformities [General Appearance - In No Acute Distress] : no acute distress [Normal Conjunctiva] : the conjunctiva exhibited no abnormalities [Normal Oral Mucosa] : normal oral mucosa [No Oral Pallor] : no oral pallor [No Oral Cyanosis] : no oral cyanosis [] : no respiratory distress [Respiration, Rhythm And Depth] : normal respiratory rhythm and effort [Exaggerated Use Of Accessory Muscles For Inspiration] : no accessory muscle use [Auscultation Breath Sounds / Voice Sounds] : lungs were clear to auscultation bilaterally [Heart Sounds] : normal S1 and S2 [Abdomen Soft] : soft [Abnormal Walk] : normal gait [Skin Turgor] : normal skin turgor [Oriented To Time, Place, And Person] : oriented to person, place, and time [Mood] : the mood was normal [Affect] : the affect was normal [No Anxiety] : not feeling anxious [FreeTextEntry1] : nares erythematous, normal TMs

## 2019-08-09 NOTE — HISTORY OF PRESENT ILLNESS
[FreeTextEntry1] : 79 year female who is concerned about a nasal discharge productive of yellow mucous with some blood. She also has a feeling of an early UTI. She has been told of wheezing.

## 2019-08-10 LAB
APPEARANCE: ABNORMAL
BASOPHILS # BLD AUTO: 0.03 K/UL
BASOPHILS NFR BLD AUTO: 0.4 %
BILIRUBIN URINE: NEGATIVE
BLOOD URINE: ABNORMAL
COLOR: YELLOW
EOSINOPHIL # BLD AUTO: 0.26 K/UL
EOSINOPHIL NFR BLD AUTO: 3.1 %
GLUCOSE QUALITATIVE U: NEGATIVE
HCT VFR BLD CALC: 44 %
HGB BLD-MCNC: 13.2 G/DL
IMM GRANULOCYTES NFR BLD AUTO: 0.2 %
KETONES URINE: NEGATIVE
LEUKOCYTE ESTERASE URINE: ABNORMAL
LYMPHOCYTES # BLD AUTO: 1.1 K/UL
LYMPHOCYTES NFR BLD AUTO: 13 %
MAN DIFF?: NORMAL
MCHC RBC-ENTMCNC: 28.7 PG
MCHC RBC-ENTMCNC: 30 GM/DL
MCV RBC AUTO: 95.7 FL
MONOCYTES # BLD AUTO: 0.68 K/UL
MONOCYTES NFR BLD AUTO: 8 %
NEUTROPHILS # BLD AUTO: 6.39 K/UL
NEUTROPHILS NFR BLD AUTO: 75.3 %
NITRITE URINE: NEGATIVE
PH URINE: 5
PLATELET # BLD AUTO: 247 K/UL
PROTEIN URINE: NORMAL
RBC # BLD: 4.6 M/UL
RBC # FLD: 13.6 %
SPECIFIC GRAVITY URINE: 1.03
UROBILINOGEN URINE: NORMAL
WBC # FLD AUTO: 8.48 K/UL

## 2019-08-16 ENCOUNTER — RX RENEWAL (OUTPATIENT)
Age: 79
End: 2019-08-16

## 2019-09-11 ENCOUNTER — MEDICATION RENEWAL (OUTPATIENT)
Age: 79
End: 2019-09-11

## 2019-09-12 ENCOUNTER — APPOINTMENT (OUTPATIENT)
Dept: HEART AND VASCULAR | Facility: CLINIC | Age: 79
End: 2019-09-12
Payer: MEDICARE

## 2019-09-12 VITALS
HEIGHT: 63 IN | OXYGEN SATURATION: 97 % | DIASTOLIC BLOOD PRESSURE: 80 MMHG | WEIGHT: 180 LBS | SYSTOLIC BLOOD PRESSURE: 140 MMHG | HEART RATE: 90 BPM | TEMPERATURE: 98.7 F | BODY MASS INDEX: 31.89 KG/M2

## 2019-09-12 DIAGNOSIS — Z87.09 PERSONAL HISTORY OF OTHER DISEASES OF THE RESPIRATORY SYSTEM: ICD-10-CM

## 2019-09-12 DIAGNOSIS — Z87.2 PERSONAL HISTORY OF DISEASES OF THE SKIN AND SUBCUTANEOUS TISSUE: ICD-10-CM

## 2019-09-12 DIAGNOSIS — Z87.01 PERSONAL HISTORY OF PNEUMONIA (RECURRENT): ICD-10-CM

## 2019-09-12 DIAGNOSIS — L98.9 DISORDER OF THE SKIN AND SUBCUTANEOUS TISSUE, UNSPECIFIED: ICD-10-CM

## 2019-09-12 DIAGNOSIS — G43.909 MIGRAINE, UNSPECIFIED, NOT INTRACTABLE, W/OUT STATUS MIGRAINOSUS: ICD-10-CM

## 2019-09-12 PROCEDURE — 90732 PPSV23 VACC 2 YRS+ SUBQ/IM: CPT

## 2019-09-12 PROCEDURE — 99214 OFFICE O/P EST MOD 30 MIN: CPT | Mod: 25

## 2019-09-12 PROCEDURE — G0009: CPT

## 2019-09-12 RX ORDER — SULFAMETHOXAZOLE AND TRIMETHOPRIM 800; 160 MG/1; MG/1
800-160 TABLET ORAL TWICE DAILY
Qty: 20 | Refills: 0 | Status: DISCONTINUED | COMMUNITY
Start: 2019-08-09 | End: 2019-09-12

## 2019-09-12 NOTE — DISCUSSION/SUMMARY
[FreeTextEntry1] : Skin lesion left leg--I contacted Dr Moreau's office to request a sooner appointment with Dr Moreau if not to see Dr Silveira in that office on Monday as scheduled. Pneumovax 23 given

## 2019-09-12 NOTE — HISTORY OF PRESENT ILLNESS
[FreeTextEntry1] : 79 year female who is on Gabapentin prescribed by Dr Cottrell of Neurology. She notes difficulty with walking that she attributes to back pain. She does not recall having a trauma but has developed a painless scab lesion at her left leg, usually seen by Dr Juan Moreau [phone]. She denies having any chest pain, palpitations or dizziness. She feels a lack of stamina

## 2019-09-12 NOTE — PHYSICAL EXAM
[General Appearance - Well Developed] : well developed [Well Groomed] : well groomed [Normal Appearance] : normal appearance [General Appearance - Well Nourished] : well nourished [No Deformities] : no deformities [General Appearance - In No Acute Distress] : no acute distress [] : no respiratory distress [Normal Conjunctiva] : the conjunctiva exhibited no abnormalities [Exaggerated Use Of Accessory Muscles For Inspiration] : no accessory muscle use [Respiration, Rhythm And Depth] : normal respiratory rhythm and effort [Auscultation Breath Sounds / Voice Sounds] : lungs were clear to auscultation bilaterally [Heart Sounds] : normal S1 and S2 [Abnormal Walk] : normal gait [FreeTextEntry1] : mild edema [Oriented To Time, Place, And Person] : oriented to person, place, and time [Affect] : the affect was normal [No Anxiety] : not feeling anxious [Mood] : the mood was normal

## 2019-10-07 ENCOUNTER — MED ADMIN CHARGE (OUTPATIENT)
Age: 79
End: 2019-10-07

## 2019-10-15 ENCOUNTER — APPOINTMENT (OUTPATIENT)
Dept: GASTROENTEROLOGY | Facility: CLINIC | Age: 79
End: 2019-10-15

## 2019-11-26 ENCOUNTER — RX RENEWAL (OUTPATIENT)
Age: 79
End: 2019-11-26

## 2019-11-27 PROBLEM — E04.1 RIGHT THYROID NODULE: Status: ACTIVE | Noted: 2017-10-18

## 2020-03-04 ENCOUNTER — RX RENEWAL (OUTPATIENT)
Age: 80
End: 2020-03-04

## 2020-03-30 ENCOUNTER — RX RENEWAL (OUTPATIENT)
Age: 80
End: 2020-03-30

## 2020-04-14 ENCOUNTER — NON-APPOINTMENT (OUTPATIENT)
Age: 80
End: 2020-04-14

## 2020-05-21 ENCOUNTER — RX RENEWAL (OUTPATIENT)
Age: 80
End: 2020-05-21

## 2020-06-09 ENCOUNTER — APPOINTMENT (OUTPATIENT)
Dept: HEART AND VASCULAR | Facility: CLINIC | Age: 80
End: 2020-06-09
Payer: MEDICARE

## 2020-06-09 PROCEDURE — 99214 OFFICE O/P EST MOD 30 MIN: CPT | Mod: 95

## 2020-06-22 ENCOUNTER — APPOINTMENT (OUTPATIENT)
Dept: GASTROENTEROLOGY | Facility: CLINIC | Age: 80
End: 2020-06-22

## 2020-07-17 ENCOUNTER — INPATIENT (INPATIENT)
Facility: HOSPITAL | Age: 80
LOS: 1 days | Discharge: HOME HEALTH | DRG: 149 | End: 2020-07-19
Attending: STUDENT IN AN ORGANIZED HEALTH CARE EDUCATION/TRAINING PROGRAM | Admitting: STUDENT IN AN ORGANIZED HEALTH CARE EDUCATION/TRAINING PROGRAM
Payer: MEDICARE

## 2020-07-17 VITALS
DIASTOLIC BLOOD PRESSURE: 79 MMHG | RESPIRATION RATE: 17 BRPM | WEIGHT: 175.71 LBS | HEART RATE: 62 BPM | HEIGHT: 63 IN | SYSTOLIC BLOOD PRESSURE: 153 MMHG | TEMPERATURE: 98 F | OXYGEN SATURATION: 95 %

## 2020-07-17 DIAGNOSIS — K57.90 DIVERTICULOSIS OF INTESTINE, PART UNSPECIFIED, WITHOUT PERFORATION OR ABSCESS WITHOUT BLEEDING: ICD-10-CM

## 2020-07-17 DIAGNOSIS — R00.2 PALPITATIONS: ICD-10-CM

## 2020-07-17 DIAGNOSIS — I25.10 ATHEROSCLEROTIC HEART DISEASE OF NATIVE CORONARY ARTERY WITHOUT ANGINA PECTORIS: ICD-10-CM

## 2020-07-17 DIAGNOSIS — R42 DIZZINESS AND GIDDINESS: ICD-10-CM

## 2020-07-17 DIAGNOSIS — Z95.5 PRESENCE OF CORONARY ANGIOPLASTY IMPLANT AND GRAFT: Chronic | ICD-10-CM

## 2020-07-17 DIAGNOSIS — F32.9 MAJOR DEPRESSIVE DISORDER, SINGLE EPISODE, UNSPECIFIED: ICD-10-CM

## 2020-07-17 DIAGNOSIS — Z90.12 ACQUIRED ABSENCE OF LEFT BREAST AND NIPPLE: Chronic | ICD-10-CM

## 2020-07-17 DIAGNOSIS — I10 ESSENTIAL (PRIMARY) HYPERTENSION: ICD-10-CM

## 2020-07-17 DIAGNOSIS — E78.5 HYPERLIPIDEMIA, UNSPECIFIED: ICD-10-CM

## 2020-07-17 DIAGNOSIS — R63.8 OTHER SYMPTOMS AND SIGNS CONCERNING FOOD AND FLUID INTAKE: ICD-10-CM

## 2020-07-17 LAB
A1C WITH ESTIMATED AVERAGE GLUCOSE RESULT: 5.5 % — SIGNIFICANT CHANGE UP (ref 4–5.6)
ALBUMIN SERPL ELPH-MCNC: 3.6 G/DL — SIGNIFICANT CHANGE UP (ref 3.3–5)
ALP SERPL-CCNC: 76 U/L — SIGNIFICANT CHANGE UP (ref 40–120)
ALT FLD-CCNC: 12 U/L — SIGNIFICANT CHANGE UP (ref 10–45)
ANION GAP SERPL CALC-SCNC: 11 MMOL/L — SIGNIFICANT CHANGE UP (ref 5–17)
APPEARANCE UR: ABNORMAL
APTT BLD: 30.2 SEC — SIGNIFICANT CHANGE UP (ref 27.5–35.5)
AST SERPL-CCNC: 17 U/L — SIGNIFICANT CHANGE UP (ref 10–40)
BACTERIA # UR AUTO: SIGNIFICANT CHANGE UP /HPF
BASOPHILS # BLD AUTO: 0.01 K/UL — SIGNIFICANT CHANGE UP (ref 0–0.2)
BASOPHILS NFR BLD AUTO: 0.2 % — SIGNIFICANT CHANGE UP (ref 0–2)
BILIRUB SERPL-MCNC: 0.4 MG/DL — SIGNIFICANT CHANGE UP (ref 0.2–1.2)
BILIRUB UR-MCNC: NEGATIVE — SIGNIFICANT CHANGE UP
BUN SERPL-MCNC: 12 MG/DL — SIGNIFICANT CHANGE UP (ref 7–23)
CALCIUM SERPL-MCNC: 9.9 MG/DL — SIGNIFICANT CHANGE UP (ref 8.4–10.5)
CHLORIDE SERPL-SCNC: 102 MMOL/L — SIGNIFICANT CHANGE UP (ref 96–108)
CHOLEST SERPL-MCNC: 139 MG/DL — SIGNIFICANT CHANGE UP (ref 10–199)
CO2 SERPL-SCNC: 27 MMOL/L — SIGNIFICANT CHANGE UP (ref 22–31)
COLOR SPEC: YELLOW — SIGNIFICANT CHANGE UP
CREAT SERPL-MCNC: 0.64 MG/DL — SIGNIFICANT CHANGE UP (ref 0.5–1.3)
DIFF PNL FLD: NEGATIVE — SIGNIFICANT CHANGE UP
EOSINOPHIL # BLD AUTO: 0.08 K/UL — SIGNIFICANT CHANGE UP (ref 0–0.5)
EOSINOPHIL NFR BLD AUTO: 1.4 % — SIGNIFICANT CHANGE UP (ref 0–6)
EPI CELLS # UR: SIGNIFICANT CHANGE UP /HPF (ref 0–5)
ESTIMATED AVERAGE GLUCOSE: 111 MG/DL — SIGNIFICANT CHANGE UP (ref 68–114)
GLUCOSE SERPL-MCNC: 189 MG/DL — HIGH (ref 70–99)
GLUCOSE UR QL: NEGATIVE — SIGNIFICANT CHANGE UP
HCT VFR BLD CALC: 45.5 % — HIGH (ref 34.5–45)
HDLC SERPL-MCNC: 49 MG/DL — LOW
HGB BLD-MCNC: 14.1 G/DL — SIGNIFICANT CHANGE UP (ref 11.5–15.5)
IMM GRANULOCYTES NFR BLD AUTO: 0.2 % — SIGNIFICANT CHANGE UP (ref 0–1.5)
INR BLD: 1.05 — SIGNIFICANT CHANGE UP (ref 0.88–1.16)
KETONES UR-MCNC: NEGATIVE — SIGNIFICANT CHANGE UP
LEUKOCYTE ESTERASE UR-ACNC: ABNORMAL
LIPID PNL WITH DIRECT LDL SERPL: 72 MG/DL — SIGNIFICANT CHANGE UP
LYMPHOCYTES # BLD AUTO: 0.75 K/UL — LOW (ref 1–3.3)
LYMPHOCYTES # BLD AUTO: 13.4 % — SIGNIFICANT CHANGE UP (ref 13–44)
MCHC RBC-ENTMCNC: 28.8 PG — SIGNIFICANT CHANGE UP (ref 27–34)
MCHC RBC-ENTMCNC: 31 GM/DL — LOW (ref 32–36)
MCV RBC AUTO: 93 FL — SIGNIFICANT CHANGE UP (ref 80–100)
MONOCYTES # BLD AUTO: 0.25 K/UL — SIGNIFICANT CHANGE UP (ref 0–0.9)
MONOCYTES NFR BLD AUTO: 4.5 % — SIGNIFICANT CHANGE UP (ref 2–14)
NEUTROPHILS # BLD AUTO: 4.51 K/UL — SIGNIFICANT CHANGE UP (ref 1.8–7.4)
NEUTROPHILS NFR BLD AUTO: 80.3 % — HIGH (ref 43–77)
NITRITE UR-MCNC: NEGATIVE — SIGNIFICANT CHANGE UP
NRBC # BLD: 0 /100 WBCS — SIGNIFICANT CHANGE UP (ref 0–0)
PH UR: 8 — SIGNIFICANT CHANGE UP (ref 5–8)
PLATELET # BLD AUTO: 168 K/UL — SIGNIFICANT CHANGE UP (ref 150–400)
POTASSIUM SERPL-MCNC: 4.2 MMOL/L — SIGNIFICANT CHANGE UP (ref 3.5–5.3)
POTASSIUM SERPL-SCNC: 4.2 MMOL/L — SIGNIFICANT CHANGE UP (ref 3.5–5.3)
PROT SERPL-MCNC: 7 G/DL — SIGNIFICANT CHANGE UP (ref 6–8.3)
PROT UR-MCNC: 30 MG/DL
PROTHROM AB SERPL-ACNC: 12.6 SEC — SIGNIFICANT CHANGE UP (ref 10.6–13.6)
RBC # BLD: 4.89 M/UL — SIGNIFICANT CHANGE UP (ref 3.8–5.2)
RBC # FLD: 13.9 % — SIGNIFICANT CHANGE UP (ref 10.3–14.5)
RBC CASTS # UR COMP ASSIST: < 5 /HPF — SIGNIFICANT CHANGE UP
SARS-COV-2 RNA SPEC QL NAA+PROBE: SIGNIFICANT CHANGE UP
SODIUM SERPL-SCNC: 140 MMOL/L — SIGNIFICANT CHANGE UP (ref 135–145)
SP GR SPEC: 1.01 — SIGNIFICANT CHANGE UP (ref 1–1.03)
TOTAL CHOLESTEROL/HDL RATIO MEASUREMENT: 2.8 RATIO — LOW (ref 3.3–7.1)
TRIGL SERPL-MCNC: 91 MG/DL — SIGNIFICANT CHANGE UP (ref 10–149)
TROPONIN T SERPL-MCNC: <0.01 NG/ML — SIGNIFICANT CHANGE UP (ref 0–0.01)
UROBILINOGEN FLD QL: 0.2 E.U./DL — SIGNIFICANT CHANGE UP
WBC # BLD: 5.61 K/UL — SIGNIFICANT CHANGE UP (ref 3.8–10.5)
WBC # FLD AUTO: 5.61 K/UL — SIGNIFICANT CHANGE UP (ref 3.8–10.5)
WBC UR QL: < 5 /HPF — SIGNIFICANT CHANGE UP

## 2020-07-17 PROCEDURE — 71045 X-RAY EXAM CHEST 1 VIEW: CPT | Mod: 26

## 2020-07-17 PROCEDURE — 70498 CT ANGIOGRAPHY NECK: CPT | Mod: 26

## 2020-07-17 PROCEDURE — 93010 ELECTROCARDIOGRAM REPORT: CPT

## 2020-07-17 PROCEDURE — 99291 CRITICAL CARE FIRST HOUR: CPT

## 2020-07-17 PROCEDURE — 0042T: CPT

## 2020-07-17 PROCEDURE — 99222 1ST HOSP IP/OBS MODERATE 55: CPT | Mod: GC

## 2020-07-17 PROCEDURE — 70450 CT HEAD/BRAIN W/O DYE: CPT | Mod: 26,59

## 2020-07-17 PROCEDURE — 70496 CT ANGIOGRAPHY HEAD: CPT | Mod: 26

## 2020-07-17 RX ORDER — ATENOLOL 25 MG/1
50 TABLET ORAL DAILY
Refills: 0 | Status: DISCONTINUED | OUTPATIENT
Start: 2020-07-17 | End: 2020-07-17

## 2020-07-17 RX ORDER — FAMOTIDINE 10 MG/ML
20 INJECTION INTRAVENOUS DAILY
Refills: 0 | Status: DISCONTINUED | OUTPATIENT
Start: 2020-07-17 | End: 2020-07-17

## 2020-07-17 RX ORDER — MECLIZINE HCL 12.5 MG
25 TABLET ORAL ONCE
Refills: 0 | Status: COMPLETED | OUTPATIENT
Start: 2020-07-17 | End: 2020-07-17

## 2020-07-17 RX ORDER — PANTOPRAZOLE SODIUM 20 MG/1
1 TABLET, DELAYED RELEASE ORAL
Qty: 0 | Refills: 0 | DISCHARGE

## 2020-07-17 RX ORDER — FAMOTIDINE 10 MG/ML
1 INJECTION INTRAVENOUS
Qty: 0 | Refills: 0 | DISCHARGE

## 2020-07-17 RX ORDER — GABAPENTIN 400 MG/1
100 CAPSULE ORAL
Refills: 0 | Status: DISCONTINUED | OUTPATIENT
Start: 2020-07-17 | End: 2020-07-17

## 2020-07-17 RX ORDER — SENNA PLUS 8.6 MG/1
2 TABLET ORAL AT BEDTIME
Refills: 0 | Status: DISCONTINUED | OUTPATIENT
Start: 2020-07-17 | End: 2020-07-19

## 2020-07-17 RX ORDER — POLYETHYLENE GLYCOL 3350 17 G/17G
17 POWDER, FOR SOLUTION ORAL DAILY
Refills: 0 | Status: DISCONTINUED | OUTPATIENT
Start: 2020-07-17 | End: 2020-07-19

## 2020-07-17 RX ORDER — DULOXETINE HYDROCHLORIDE 30 MG/1
60 CAPSULE, DELAYED RELEASE ORAL DAILY
Refills: 0 | Status: DISCONTINUED | OUTPATIENT
Start: 2020-07-17 | End: 2020-07-19

## 2020-07-17 RX ORDER — ONDANSETRON 8 MG/1
4 TABLET, FILM COATED ORAL EVERY 6 HOURS
Refills: 0 | Status: DISCONTINUED | OUTPATIENT
Start: 2020-07-17 | End: 2020-07-19

## 2020-07-17 RX ORDER — AMLODIPINE BESYLATE 2.5 MG/1
5 TABLET ORAL DAILY
Refills: 0 | Status: DISCONTINUED | OUTPATIENT
Start: 2020-07-17 | End: 2020-07-19

## 2020-07-17 RX ORDER — GABAPENTIN 400 MG/1
1 CAPSULE ORAL
Qty: 0 | Refills: 0 | DISCHARGE

## 2020-07-17 RX ORDER — NORTRIPTYLINE HYDROCHLORIDE 10 MG/1
1 CAPSULE ORAL
Qty: 0 | Refills: 0 | DISCHARGE

## 2020-07-17 RX ORDER — ASPIRIN/CALCIUM CARB/MAGNESIUM 324 MG
81 TABLET ORAL DAILY
Refills: 0 | Status: DISCONTINUED | OUTPATIENT
Start: 2020-07-17 | End: 2020-07-19

## 2020-07-17 RX ORDER — ENOXAPARIN SODIUM 100 MG/ML
40 INJECTION SUBCUTANEOUS EVERY 24 HOURS
Refills: 0 | Status: DISCONTINUED | OUTPATIENT
Start: 2020-07-17 | End: 2020-07-19

## 2020-07-17 RX ORDER — ATORVASTATIN CALCIUM 80 MG/1
20 TABLET, FILM COATED ORAL AT BEDTIME
Refills: 0 | Status: DISCONTINUED | OUTPATIENT
Start: 2020-07-17 | End: 2020-07-19

## 2020-07-17 RX ORDER — ONDANSETRON 8 MG/1
4 TABLET, FILM COATED ORAL ONCE
Refills: 0 | Status: COMPLETED | OUTPATIENT
Start: 2020-07-17 | End: 2020-07-17

## 2020-07-17 RX ORDER — SODIUM CHLORIDE 9 MG/ML
1000 INJECTION INTRAMUSCULAR; INTRAVENOUS; SUBCUTANEOUS ONCE
Refills: 0 | Status: COMPLETED | OUTPATIENT
Start: 2020-07-17 | End: 2020-07-17

## 2020-07-17 RX ORDER — PANTOPRAZOLE SODIUM 20 MG/1
40 TABLET, DELAYED RELEASE ORAL
Refills: 0 | Status: DISCONTINUED | OUTPATIENT
Start: 2020-07-17 | End: 2020-07-19

## 2020-07-17 RX ADMIN — ATORVASTATIN CALCIUM 20 MILLIGRAM(S): 80 TABLET, FILM COATED ORAL at 21:22

## 2020-07-17 RX ADMIN — ONDANSETRON 4 MILLIGRAM(S): 8 TABLET, FILM COATED ORAL at 09:20

## 2020-07-17 RX ADMIN — Medication 25 MILLIGRAM(S): at 09:58

## 2020-07-17 RX ADMIN — AMLODIPINE BESYLATE 5 MILLIGRAM(S): 2.5 TABLET ORAL at 15:30

## 2020-07-17 RX ADMIN — SODIUM CHLORIDE 250 MILLILITER(S): 9 INJECTION INTRAMUSCULAR; INTRAVENOUS; SUBCUTANEOUS at 10:11

## 2020-07-17 RX ADMIN — Medication 81 MILLIGRAM(S): at 15:31

## 2020-07-17 RX ADMIN — ENOXAPARIN SODIUM 40 MILLIGRAM(S): 100 INJECTION SUBCUTANEOUS at 17:14

## 2020-07-17 RX ADMIN — DULOXETINE HYDROCHLORIDE 60 MILLIGRAM(S): 30 CAPSULE, DELAYED RELEASE ORAL at 15:31

## 2020-07-17 NOTE — ED PROVIDER NOTE - PHYSICAL EXAMINATION
GEN: Well appearing, well nourished, awake, alert, oriented to person, place, time/situation and in no apparent distress. NTAF  ENT: Airway patent, Nasal mucosa clear. Mouth with normal mucosa.  EYES: Clear bilaterally. PERRL, EOMI  RESPIRATORY: Breathing comfortably with normal RR. No W/C/R, no hypoxia or resp distress.  CARDIAC: Regular rate and rhythm, no M/R/G  ABDOMEN: Soft, nontender, +bowel sounds, no rebound, rigidity, or guarding.  MSK: Range of motion is not limited, no deformities noted.  NEURO: Alert and oriented x 3. Cn 2-12 intact. Strength 5/5 and sensation intact in all 4 extremities. no pronator drift. FTN normal. Unable to ambulate due to extreme dizziness provoked with movement.  SKIN: Skin normal color for race, warm, dry and intact. No evidence of rash.  PSYCH: Alert and oriented to person, place, time/situation. normal mood and affect. no apparent risk to self or others.

## 2020-07-17 NOTE — H&P ADULT - PROBLEM SELECTOR PLAN 6
Patient with history of diverticulosis. Follows with Dr. Woods (GI). Per patient, last colonoscopy performed approx 3 years prior, showed presence of diverticulosis and pt reports having 3 polyps removed at that time, all benign. She denies any hematochezia, melena. Patient states that is she to have repeat colonoscopy this year.   - No acute intervention needed at this time  - Monitor vitals/CBC Patient with history of diverticulosis. Follows with Dr. Casanova (GI). Per patient, last colonoscopy performed approx 3 years prior, showed presence of diverticulosis and pt reports having 3 polyps removed at that time, all benign. She denies any hematochezia, melena. Patient states that is she to have repeat colonoscopy this year.   - Miralax/senna for constipation   - No other acute intervention needed at this time  - Monitor vitals/CBC

## 2020-07-17 NOTE — PATIENT PROFILE ADULT - PUBLIC BENEFITS
"Chief Complaint   Patient presents with     Conjunctivitis       Initial /69 (BP Location: Left arm, Patient Position: Chair, Cuff Size: Adult Regular)  Pulse 105  Temp 98.2  F (36.8  C) (Oral)  Resp 13  Ht 4' 0.75\" (1.238 m)  Wt 53 lb 9.6 oz (24.3 kg)  SpO2 95%  BMI 15.86 kg/m2 Estimated body mass index is 15.86 kg/(m^2) as calculated from the following:    Height as of this encounter: 4' 0.75\" (1.238 m).    Weight as of this encounter: 53 lb 9.6 oz (24.3 kg).  Medication Reconciliation: complete     Rudolph Devi      " no

## 2020-07-17 NOTE — H&P ADULT - PROBLEM SELECTOR PLAN 8
- c/w home duloxetine Patient with history of depression/anxiety.   - c/w home duloxetine 60 mg PO QD

## 2020-07-17 NOTE — H&P ADULT - NSHPLABSRESULTS_GEN_ALL_CORE
LABS:                         14.1   5.61  )-----------( 168      ( 2020 09:14 )             45.5     07-    140  |  102  |  12  ----------------------------<  189<H>  4.2   |  27  |  0.64    Ca    9.9      2020 09:14    TPro  7.0  /  Alb  3.6  /  TBili  0.4  /  DBili  x   /  AST  17  /  ALT  12  /  AlkPhos  76  07-17    PT/INR - ( 2020 09:14 )   PT: 12.6 sec;   INR: 1.05          PTT - ( 2020 09:14 )  PTT:30.2 sec  Urinalysis Basic - ( 2020 10:01 )    Color: Yellow / Appearance: Hazy / S.015 / pH: x  Gluc: x / Ketone: NEGATIVE  / Bili: Negative / Urobili: 0.2 E.U./dL   Blood: x / Protein: 30 mg/dL / Nitrite: NEGATIVE   Leuk Esterase: Trace / RBC: < 5 /HPF / WBC < 5 /HPF   Sq Epi: x / Non Sq Epi: 0-5 /HPF / Bacteria: None /HPF      CARDIAC MARKERS ( 2020 09:14 )  x     / <0.01 ng/mL / x     / x     / x          RADIOLOGY, EKG & ADDITIONAL TESTS: Reviewed.     < from: CT Brain Stroke Protocol (20 @ 09:16) >  No acute intracranial hemorrhage or territorial infarction.  Age-appropriate volume loss with chronic small vessel ischemic disease, as above.    < from: CT Perfusion w/ Maps w/ IV Cont (20 @ 09:21) >  Negative CT perfusion study.    < from: CT Angio Head w/ IV Cont (20 @ 09:22) >  No steno-occlusive disease in the carotid or vertebral arteries in the neck.

## 2020-07-17 NOTE — ED ADULT NURSE NOTE - NSIMPLEMENTINTERV_GEN_ALL_ED
Implemented All Fall Risk Interventions:  [place] to call system. Call bell, personal items and telephone within reach. Instruct patient to call for assistance. Room bathroom lighting operational. Non-slip footwear when patient is off stretcher. Physically safe environment: no spills, clutter or unnecessary equipment. Stretcher in lowest position, wheels locked, appropriate side rails in place. Provide visual cue, wrist band, yellow gown, etc. Monitor gait and stability. Monitor for mental status changes and reorient to person, place, and time. Review medications for side effects contributing to fall risk. Reinforce activity limits and safety measures with patient and family.

## 2020-07-17 NOTE — H&P ADULT - NSHPPHYSICALEXAM_GEN_ALL_CORE
VITAL SIGNS:  T(F): 97.7 (07-17-20 @ 13:20), Max: 98.4 (07-17-20 @ 09:01)  HR: 60 (07-17-20 @ 13:20) (59 - 62)  BP: 145/79 (07-17-20 @ 13:20) (145/79 - 168/79)  RR: 18 (07-17-20 @ 13:20) (17 - 18)  SpO2: 96% (07-17-20 @ 13:20) (95% - 96%)    PHYSICAL EXAM:    Constitutional: Elderly female, lying in ED bed. No acute distress.   Head: NC/AT  Eyes: PERRL, EOMI, clear conjunctiva  ENT: no nasal discharge; uvula midline, no oropharyngeal erythema or exudates; MMM  Neck: supple; no JVD or thyromegaly  Respiratory: CTA B/L; no W/R/R, no retractions  Cardiac: +S1/S2; RRR; no M/R/G;  Gastrointestinal: Soft, protuberant. Non-tender. L abd fullness. BS x4.   Extremities: WWP, no clubbing or cyanosis; no peripheral edema  Musculoskeletal: No joint swelling, tenderness or erythema  Vascular: 2+ radial, DP/PT pulses B/L  Dermatologic: Skin warm, dry and intact; no rashes, wounds, or scars.    Neurologic: AAOx3. CN II-XII grossly intact. Patient states decreased hearing on L compared to R side. Slight pronator drift on L arm. Strength 5/5 throughout all extremities. Sensation to light touch intact, equal, b/l in all extremities. Finger-to-nose coordinate intact. Gait not assessed. Proprioception in tact in all 4 extremities. No nystagmus noted with Prentiss-Hallpike maneuver.           Psychiatric: affect and characteristics of appearance, verbalizations, behaviors are appropriate      Hearing less in the L side compared to the R  Slight tremor  slight pronator drift on the L  L sided weakness, self reported VITAL SIGNS:  T(F): 97.7 (07-17-20 @ 13:20), Max: 98.4 (07-17-20 @ 09:01)  HR: 60 (07-17-20 @ 13:20) (59 - 62)  BP: 145/79 (07-17-20 @ 13:20) (145/79 - 168/79)  RR: 18 (07-17-20 @ 13:20) (17 - 18)  SpO2: 96% (07-17-20 @ 13:20) (95% - 96%)    PHYSICAL EXAM:    Constitutional: Elderly female, lying in ED bed. No acute distress.   Head: NC/AT  Eyes: PERRL, EOMI, clear conjunctiva  ENT: no nasal discharge; uvula midline, no oropharyngeal erythema or exudates; MMM  Neck: supple; no JVD or thyromegaly  Respiratory: CTA B/L; no W/R/R, no retractions  Cardiac: +S1/S2; RRR; no M/R/G;  Gastrointestinal: Soft, protuberant. Non-tender. L abd fullness. BS x4.   Extremities: WWP, no clubbing or cyanosis; no peripheral edema  Musculoskeletal: No joint swelling, tenderness or erythema  Vascular: 2+ radial, DP/PT pulses B/L  Dermatologic: Skin warm, dry and intact; no rashes, wounds, or scars.  Neurologic: AAOx3. CN II-XII grossly intact. Patient states decreased hearing on R compared to L side. Slight pronator drift on R arm. Strength 5/5 throughout all extremities. Sensation to light touch intact, equal, b/l in all extremities. Finger-to-nose coordinate intact. Gait not assessed. Proprioception in tact in all 4 extremities. No nystagmus noted with Stuart-Hallpike maneuver.   Psychiatric: affect and characteristics of appearance, verbalizations, behaviors are appropriate VITAL SIGNS:  T(F): 97.7 (07-17-20 @ 13:20), Max: 98.4 (07-17-20 @ 09:01)  HR: 60 (07-17-20 @ 13:20) (59 - 62)  BP: 145/79 (07-17-20 @ 13:20) (145/79 - 168/79)  RR: 18 (07-17-20 @ 13:20) (17 - 18)  SpO2: 96% (07-17-20 @ 13:20) (95% - 96%)    PHYSICAL EXAM:    Constitutional: Elderly female, lying in ED bed. No acute distress.   Head: NC/AT  Eyes: PERRL, EOMI, clear conjunctiva  ENT: no nasal discharge; uvula midline, no oropharyngeal erythema or exudates; MMM  Neck: supple; no JVD or thyromegaly  Respiratory: CTA B/L; no W/R/R, no retractions  Cardiac: +S1/S2; RRR; no M/R/G;  Gastrointestinal: Soft, protuberant. Non-tender. L abd fullness. BS x4.   Extremities: WWP, no clubbing or cyanosis; no peripheral edema  Musculoskeletal: No joint swelling, tenderness or erythema  Vascular: 2+ radial, DP/PT pulses B/L  Dermatologic: Skin warm, dry and intact; no rashes, wounds, or scars.  Neurologic: AAOx3. CN II-XII grossly intact. Patient states decreased hearing on R compared to L side. Slight pronator drift on R arm. Strength 5/5 throughout all extremities. Sensation to light touch intact, equal, b/l in all extremities. Finger-to-nose coordinate intact. Gait not assessed. Proprioception in tact in all 4 extremities. No nystagmus noted with Stuart-Hallpike maneuver however patient became very dizzy with movement.   Psychiatric: affect and characteristics of appearance, verbalizations, behaviors are appropriate

## 2020-07-17 NOTE — H&P ADULT - PROBLEM SELECTOR PLAN 1
Patient reports 2 episodes of dizziness associated with nausea, feeling like the room is spinning. No history of prior episodes. No falls, no LOC. Upon presentation, stroke code was called. CTH noncon (7/17) showing no evidence of acute intracranial hemorrhage or infarction. CTA head (7/17) showing no evidence of occlusion. On admission, neurological exam largely WNL. Patient reports decreased hearing on R compared to L. Slight pronator drift on R side. Stuart-Hallpike maneuver with no nystagmus.     Current consideration for dizziness 2/2 vertigo vs BPPV vs medication side effect (gabapentin, aspirin) vs other cardiac causes.   - currently holding pt home gabapentin   - fall precautions in place   - monitor vitals  - consider orthostatics once pt more comfortable  - Zofran PRN for nausea/vomiting ()   - PT consult when dizziness improves Patient reports 2 episodes of dizziness associated with nausea, feeling like the room is spinning. No history of prior episodes. No falls, no LOC. Upon presentation, stroke code was called. Neurological exam normal with no evidence of focal findings/deficits. NIHSS score 0.  CTH noncon (7/17) showing no evidence of acute intracranial hemorrhage or infarction. CTA head (7/17) showing no evidence of occlusion. Patient was given fluids, zofran, and meclizine in the ED. On physical exam, patient reports decreased hearing on R compared to L. Slight pronator drift on R side. Stuart-Hallpike maneuver with no nystagmus. Current consideration for dizziness 2/2 vertigo vs BPPV vs medication side effect (gabapentin, aspirin) vs other cardiac causes.   - currently holding pt home gabapentin   - fall precautions in place   - monitor vitals  - consider orthostatics once pt more comfortable  - Zofran PRN for nausea/vomiting ()   - PT consult when dizziness improves Patient reports 2 episodes of dizziness associated with nausea, feeling like the room is spinning. No history of prior episodes. No falls, no LOC. Upon presentation, stroke code was called. Neurological exam normal with no evidence of focal findings/deficits. NIHSS score 0.  CTH noncon (7/17) showing no evidence of acute intracranial hemorrhage or infarction. CTA head (7/17) showing no evidence of occlusion. Patient was given fluids, zofran, and meclizine in the ED. On physical exam, patient reports decreased hearing on R compared to L. Slight pronator drift on R side. Stuart-Hallpike maneuver with no nystagmus however increased dizziness with head movement. Current consideration for dizziness 2/2 vertigo vs BPPV vs medication side effect (gabapentin, aspirin) vs other cardiac causes.   - currently holding pt home gabapentin   - fall precautions in place   - monitor vitals  - consider orthostatics once pt more comfortable however pt received fluids in ED  - Zofran PRN for nausea/vomiting ()   - PT consult when dizziness improves

## 2020-07-17 NOTE — H&P ADULT - NSHPSOCIALHISTORY_GEN_ALL_CORE
Patient denies current tobacco use. Previously smoked 1/2 PPD for 2-3 years but has not smoked for "a long time."    Patient denies alcohol use. Patient denies illicit drug use.    Patient lives at home alone and is able to take care of herself. She ambulates on her own at baseline without the use of any canes or walkers. Normal

## 2020-07-17 NOTE — H&P ADULT - ATTENDING COMMENTS
80 year old woman with HTN, CAD, Breast cancer (presumed in remission), depression/anxiety, migraines, presenting with room-spinning dizziness that started early this morning.     # Dizziness  Room spinning sensation, lasting ~ 1 minute at a time, with any head movement (even while lying down). First episode in her lifetime. No changes in hearing, or sensation of ear fullness. No recent history of vomiting/diarrhea to suggest hypovolemia. Not improved after bolus of fluids received in the ED. Stuart-Hallpike maneuver with head turned to the right reproduced room-spinning sensation.   Most likely BPPV .  Small chance of Cerebellar stroke – but given absence of other neurological findings, this is unlikely. In addition, imaging is unlikely to  (patient is already on aspirin and statin). Would not re-image her head to look for ischemia or hemorrhage unless symptoms worsen.     Attempted Epley maneuver today (started with head turned to the right) – but maneuver made room-spinning sensation worse (per patient, worse than with Jackson-Hallpike)  -	Would attempt Epley maneuver again tomorrow, this time starting with her head turned to her left.   -	If symptoms improve after Epley, would teach her Epley maneuver and Andrews-Daroff maneuver (easier to perform alone at home for elderly)  -	If symptoms do not resolve with Epley maneuver, would refer to vestibular rehab.   -	Avoid meclizine (anticholinergic in elderly)    Rest of history and plan per Dr. Birch’s excellent note above.

## 2020-07-17 NOTE — H&P ADULT - PROBLEM SELECTOR PLAN 7
- c/w Patient with history of HTN. BP on admission 153/79. Patient did not take any of her medications today prior to coming to the ED.  - c/w home medication (amlodipine 5mg QD)

## 2020-07-17 NOTE — ED PROVIDER NOTE - OBJECTIVE STATEMENT
80y Female w/ PMH HTN, HLD, CAD, depression coming in with dizziness associated with movement. Pt states that she went to bed in her usual state of health around 11 pm. She wke up around 2 am and felt extremely dizzy, like the room was spinning, when she sat up. She states it was relieved when she kept still and laid down. This morning the dizziness recurred when she sat up and she felt like she could not walk. Associated with nausea, no vomits, no cp/sob, no syncope, no n/t/w in ext, no speech or vision changes, She is compliant with her home medications. 80y Female w/ PMH HTN, HLD, CAD, depression coming in with dizziness associated with movement. Pt states that she went to bed in her usual state of health around 11 pm. She woke up around 2 am and felt extremely dizzy, like the room was spinning, when she sat up. She states it was relieved when she kept still and laid down. This morning the dizziness recurred when she sat up and she felt like she could not walk. Associated with nausea, no vomits, no cp/sob, no syncope, no n/t/w in ext, no speech or vision changes, no headache/neck She is compliant with her home medications.

## 2020-07-17 NOTE — ED ADULT TRIAGE NOTE - CHIEF COMPLAINT QUOTE
Patient complaining of dizziness, nausea and blurry vision, onset approximately 2AM today.  Patient states she went to sleep at 11PM last night.  Patient denies any CP, SOB, vomiting, fevers, diarrhea or any other complaints at this time.

## 2020-07-17 NOTE — H&P ADULT - PROBLEM SELECTOR PLAN 9
F: no IVF  E: K>4 Mg>2, monitor and replete as needed    N: DASH/TLC diet with milk allergy restrictions    DVT ppx: None  GI ppx: None    D: RMF    FULL CODE F: no IVF  E: K>4 Mg>2, monitor and replete as needed    N: DASH/TLC diet with milk allergy restrictions    DVT ppx: Lovenox   GI ppx: None    D: RMF    FULL CODE F: no IVF  E: K>4 Mg>2, monitor and replete as needed    N: DASH/TLC diet with milk allergy restrictions    DVT ppx: Lovenox 40mg QD  GI ppx: Protonix 40 mg PO     D: RMF    FULL CODE

## 2020-07-17 NOTE — PATIENT PROFILE ADULT - STATED REASON FOR ADMISSION
"I was excessively dizzy. I got up to go to the bathroom and felt dizzy then 3 hours later I woke up again and had trouble standing."

## 2020-07-17 NOTE — CONSULT NOTE ADULT - SUBJECTIVE AND OBJECTIVE BOX
**STROKE CODE CONSULT NOTE**    Last known well time/Time of onset of symptoms: 11 pm last night     HPI:   80y Female w/ PMH HTN, HLD, depression coming in with dizziness associated with movement. Pt states that she went to bed in her usual state of health around 11 pm. She wke up around 2 am and felt extremely dizzy, like the room was spinning, when she sat up. She states it was relieved when she kept still and laid down. THis morning the dizziness recurred when she sat up and she felt like she could not walk. She states it is associated .     PAST MEDICAL & SURGICAL HISTORY:  Breast cancer in female: Left  Diverticulitis of colon: Diverticulitis  Atherosclerosis of coronary artery: s/p PCI  Hyperlipidemia: HLD (hyperlipidemia)  Essential hypertension: HTN (hypertension)  Gastroesophageal reflux disease: GERD (gastroesophageal reflux disease)  History of heart artery stent  History of lumpectomy of left breast  Other postprocedural status: S/P appendectomy  Status post total hysterectomy: S/P hysterectomy      FAMILY HISTORY:  No pertinent family history in first degree relatives      SOCIAL HISTORY:  Denies smoking, drinking, or drug use    ROS:  Constitutional: No fever, weight loss or fatigue  Eyes: No eye pain, visual disturbances, or discharge  ENMT:  No difficulty hearing, tinnitus, vertigo; No sinus or throat pain  Neck: No pain or stiffness  Respiratory: No cough, wheezing, chills or hemoptysis  Cardiovascular: No chest pain, palpitations, shortness of breath, or leg swelling  Gastrointestinal: No abdominal pain. No hematemesis; No diarrhea or constipation. Nohematochezia.  Genitourinary: No dysuria, frequency, hematuria or incontinence  Neurological: As per HPI      MEDICATIONS  (STANDING):  ondansetron Injectable 4 milliGRAM(s) IV Push once    MEDICATIONS  (PRN):      Allergies    lactose (Unknown)  No Known Drug Allergies    Intolerances    NSAIDs (Unknown)      Vital Signs Last 24 Hrs  T(C): 36.9 (17 Jul 2020 09:01), Max: 36.9 (17 Jul 2020 09:01)  T(F): 98.4 (17 Jul 2020 09:01), Max: 98.4 (17 Jul 2020 09:01)  HR: 62 (17 Jul 2020 09:01) (62 - 62)  BP: 153/79 (17 Jul 2020 09:01) (153/79 - 153/79)  BP(mean): --  RR: 17 (17 Jul 2020 09:01) (17 - 17)  SpO2: 95% (17 Jul 2020 09:01) (95% - 95%)    PHYSICAL EXAM:  Constitutional: WDWN; NAD    Neurologic:  -Mental status: Awake, alert and oriented to person, age, and month. Speech is fluent with intact naming, able to describe picture in full.  Recent and remote memory intact.  Attention/concentration intact.  No dysarthria, no aphasia.  Fund of knowledge appropriate.    -Cranial nerves:  II: Visual fields full to confrontation. Pupils PERRL  III, IV, VI: extraocular movements are intact without nystagmus  VII: No facial droop   -Motor:  Normal bulk and tone, strength 5/5 in bilateral upper and lower extremities.   strength 5/5.    -Sensation: Intact to light touch.  No neglect.   -Coordination: No dysmetria on finger-to-nose  -Reflexes:downgoing toes bilaterally  -Gait: deferred as patient became extremely nauseous when sitting up    NIHSS: 0    Fingerstick Blood Glucose: CAPILLARY BLOOD GLUCOSE  161 (17 Jul 2020 09:08)      POCT Blood Glucose.: 161 mg/dL (17 Jul 2020 08:59)       LABS:                        14.1   5.61  )-----------( 168      ( 17 Jul 2020 09:14 )             45.5           PT/INR - ( 17 Jul 2020 09:14 )   PT: 12.6 sec;   INR: 1.05          PTT - ( 17 Jul 2020 09:14 )  PTT:30.2 sec          RADIOLOGY & ADDITIONAL STUDIES:  < from: CT Brain Stroke Protocol (07.17.20 @ 09:16) >  IMPRESSION:     No acute intracranial hemorrhage or territorial infarction.    Age-appropriate volume loss with chronic small vessel ischemic disease, as above.    < end of copied text >        IV-tPA:         No                            Reason IV-tPA not given: outside window    ASSESSMENT/PLAN:    80y Female w/ PMH HTN, HLD, depression coming in with dizziness associated with movement  CT head with no acute events. CTA/CTP with no acute findings.  Initial NIHSS 0  ICH score: N/A    Likely peripheral vertigo given symptoms with movement and relatively normal blood pressure.   -Recommend symptomatic treatment with fluids, meclizine, and antinausea medication per ED  -Treatment per ED **STROKE CODE CONSULT NOTE**    Last known well time/Time of onset of symptoms: 11 pm last night     HPI:   80y Female w/ PMH HTN, HLD, CAD, depression coming in with dizziness associated with movement. Pt states that she went to bed in her usual state of health around 11 pm. She wke up around 2 am and felt extremely dizzy, like the room was spinning, when she sat up. She states it was relieved when she kept still and laid down. This morning the dizziness recurred when she sat up and she felt like she could not walk. She states it is associated with nausea, but has not vomited. She states this has never happened to her before. She is compliant with her home medications.     In the ER, her BP was in the 150s. She was fine laying in CT scan, but got extremely nauseous when she sat up after the scan.    PAST MEDICAL & SURGICAL HISTORY:  Breast cancer in female: Left  Diverticulitis of colon: Diverticulitis  Atherosclerosis of coronary artery: s/p PCI  Hyperlipidemia: HLD (hyperlipidemia)  Essential hypertension: HTN (hypertension)  Gastroesophageal reflux disease: GERD (gastroesophageal reflux disease)  History of heart artery stent  History of lumpectomy of left breast  Other postprocedural status: S/P appendectomy  Status post total hysterectomy: S/P hysterectomy      FAMILY HISTORY:  No pertinent family history in first degree relatives      SOCIAL HISTORY:  Denies smoking, drinking, or drug use    ROS:  Constitutional: No fever, weight loss or fatigue  Eyes: No eye pain, visual disturbances, or discharge  ENMT:  No difficulty hearing, tinnitus, vertigo; No sinus or throat pain  Neck: No pain or stiffness  Respiratory: No cough, wheezing, chills or hemoptysis  Cardiovascular: No chest pain, palpitations, shortness of breath, or leg swelling  Gastrointestinal: No abdominal pain. No hematemesis; No diarrhea or constipation. Nohematochezia.  Genitourinary: No dysuria, frequency, hematuria or incontinence  Neurological: As per HPI      MEDICATIONS  (STANDING):  ondansetron Injectable 4 milliGRAM(s) IV Push once    MEDICATIONS  (PRN):      Allergies    lactose (Unknown)  No Known Drug Allergies    Intolerances    NSAIDs (Unknown)      Vital Signs Last 24 Hrs  T(C): 36.9 (17 Jul 2020 09:01), Max: 36.9 (17 Jul 2020 09:01)  T(F): 98.4 (17 Jul 2020 09:01), Max: 98.4 (17 Jul 2020 09:01)  HR: 62 (17 Jul 2020 09:01) (62 - 62)  BP: 153/79 (17 Jul 2020 09:01) (153/79 - 153/79)  BP(mean): --  RR: 17 (17 Jul 2020 09:01) (17 - 17)  SpO2: 95% (17 Jul 2020 09:01) (95% - 95%)    PHYSICAL EXAM:  Constitutional: WDWN; NAD    Neurologic:  -Mental status: Awake, alert and oriented to person, age, and month. Speech is fluent with intact naming, able to describe picture in full.  Recent and remote memory intact.  Attention/concentration intact.  No dysarthria, no aphasia.  Fund of knowledge appropriate.    -Cranial nerves:  II: Visual fields full to confrontation. Pupils PERRL  III, IV, VI: extraocular movements are intact without nystagmus  VII: No facial droop   -Motor:  Normal bulk and tone, strength 5/5 in bilateral upper and lower extremities.   strength 5/5.    -Sensation: Intact to light touch.  No neglect.   -Coordination: No dysmetria on finger-to-nose  -Reflexes:downgoing toes bilaterally  -Gait: deferred as patient became extremely nauseous when sitting up    NIHSS: 0    Fingerstick Blood Glucose: CAPILLARY BLOOD GLUCOSE  161 (17 Jul 2020 09:08)      POCT Blood Glucose.: 161 mg/dL (17 Jul 2020 08:59)       LABS:                        14.1   5.61  )-----------( 168      ( 17 Jul 2020 09:14 )             45.5           PT/INR - ( 17 Jul 2020 09:14 )   PT: 12.6 sec;   INR: 1.05          PTT - ( 17 Jul 2020 09:14 )  PTT:30.2 sec          RADIOLOGY & ADDITIONAL STUDIES:  < from: CT Brain Stroke Protocol (07.17.20 @ 09:16) >  IMPRESSION:     No acute intracranial hemorrhage or territorial infarction.    Age-appropriate volume loss with chronic small vessel ischemic disease, as above.    < end of copied text >        IV-tPA:         No                            Reason IV-tPA not given: outside window    ASSESSMENT/PLAN:    80y Female w/ PMH HTN, HLD, CAD, depression coming in with dizziness associated with movement  CT head with no acute events. CTA/CTP with no acute findings.  Initial NIHSS 0  ICH score: N/A    Likely peripheral vertigo given symptoms with movement and relatively normal blood pressure.   -Recommend symptomatic treatment with fluids, meclizine, and antinausea medication per ED  -Treatment per ED

## 2020-07-17 NOTE — H&P ADULT - PROBLEM SELECTOR PLAN 4
Patient with history of hyperlipidemia.  - c/w home meds (atorvastatin 20mg) Patient with history of hyperlipidemia.   - c/w home meds (atorvastatin 20mg)

## 2020-07-17 NOTE — H&P ADULT - HISTORY OF PRESENT ILLNESS
81 yo F with PMHx of HTN, CAD (1 stent), Diverticulosis, Breast ca (s/p lumpectomy, RT, now in remission), HLD, acid reflux,  depression/anxiety BIBEMS to [facility] ED following 2 episodes of dizziness that occurred overnight at home. Patient states that she was feeling overall well and went to sleep and woke up at approx 1-2 AM and upon sitting up in bed, she felt dizzy with feelings of the room spinning. Patient states she then laid back down in bed and the episode of dizziness resolved. She states experiencing a second similar episode when she woke up this morning. She states getting up from bed to walk to the bathroom when she experienced dizziness, feelings of the room spinning, and increased weakness that the patient said caused her to feel unsteady. No falls, no LOC. Both episodes of dizziness were associated with nausea however, no vomiting. Patient denies having any history of any similar episodes in the past. In addition, patient also states noticing episodes of "heart racing" that have occurred over the past two days prior to admission. She states she intermittently feels her heart beating very fast and these episodes resolve on their own, patient does not take any medication for it, not associated with chest pain, SOB, lightheadedness. Otherwise, patient denies any lightheadedness, fevers, chills, hearing loss, chest pain, cough, abdominal pain, changes in bowel movements, numbness, tingling.     Of note, upon initial arrival to the ED, a stroke code was called.     In the ED,  VS: T 98.4 F, /79, HR 62, RR 17, SpO2 95% RA  Labs: Largely unremarkable. Glu 189, Neg Trop  UA: Hazy yellow, 30 protein, trace LE, neg nitrites, neg bacteria   EKG: Sinus rhythm, left-axis deviation  Imaging: CTH noncon (7/17) showing no acute intracranial hemorrhage/territorial infarction. Age appropriate volume loss with chronic small vessel ischemic disease. CTA head (7/17) showing no vasocclusive disease.   Orders: Zofran x1, Meclizine x1, 1 L NS 81 yo F with PMHx of HTN, CAD (1 stent), Diverticulosis, Breast ca (s/p lumpectomy, RT, now in remission), HLD, acid reflux,  depression/anxiety BIBEMS to [facility] ED following 2 episodes of dizziness that occurred overnight at home. Patient states that she was feeling overall well and went to sleep and woke up at approx 1-2 AM and upon sitting up in bed, she felt dizzy with feelings of the room spinning. Patient states she then laid back down in bed and the episode of dizziness resolved. She states experiencing a second similar episode when she woke up this morning. She states getting up from bed to walk to the bathroom when she experienced dizziness, feelings of the room spinning, and increased weakness that the patient said caused her to feel unsteady. No falls, no LOC. Both episodes of dizziness were associated with nausea however, no vomiting. Patient denies having any history of any similar episodes in the past. In addition, patient also states noticing episodes of "heart racing" that have occurred over the past two days prior to admission. She states she intermittently feels her heart beating very fast and these episodes resolve on their own, patient does not take any medication for it, not associated with chest pain, SOB, lightheadedness. Otherwise, patient denies any lightheadedness, fevers, chills, hearing loss, chest pain, cough, abdominal pain, changes in bowel movements, numbness, tingling.     Of note, upon initial arrival to the ED, a stroke code was called. Neurological exam WNL without any focal deficits/findings at that time. CTH noncon (7/17) performed revealing no evidence of acute intracranial hemorrhage or territorial infarction. CTA head/neck (7/17) without evidence of vasooclusive disease. Initial NIHSS score. Patient was given symptomatic treatment with fluids, meclizine, zofran.     In the ED,  VS: T 98.4 F, /79, HR 62, RR 17, SpO2 95% RA  Labs: Largely unremarkable. Glu 189, Neg Trop  UA: Hazy yellow, 30 protein, trace LE, neg nitrites, neg bacteria   EKG: Sinus rhythm, left-axis deviation  Imaging: CTH noncon (7/17) showing no acute intracranial hemorrhage/territorial infarction. Age appropriate volume loss with chronic small vessel ischemic disease. CTA head (7/17) showing no vasocclusive disease.   Orders: Zofran x1, Meclizine x1, 1 L NS 79 yo F with PMHx of HTN, CAD (1 stent), Diverticulosis, Breast ca (s/p lumpectomy, RT, now in remission), HLD, acid reflux,  depression/anxiety, migraines BIBEMS to [facility] ED following 2 episodes of dizziness that occurred overnight at home. Patient states that she was feeling overall well and went to sleep and woke up at approx 1-2 AM and upon sitting up in bed, she felt dizzy with feelings of the room spinning. Patient states she then laid back down in bed and the episode of dizziness resolved. She states experiencing a second similar episode when she woke up this morning. She states getting up from bed to walk to the bathroom when she experienced dizziness, feelings of the room spinning, and increased weakness that the patient said caused her to feel unsteady. No falls, no LOC. Both episodes of dizziness were associated with nausea however, no vomiting. Patient denies having any history of any similar episodes in the past. In addition, patient also states noticing episodes of "heart racing" that have occurred over the past two days prior to admission. She states she intermittently feels her heart beating very fast and these episodes resolve on their own, patient does not take any medication for it, not associated with chest pain, SOB, lightheadedness. Otherwise, patient denies any lightheadedness, fevers, chills, hearing loss, chest pain, cough, abdominal pain, changes in bowel movements, numbness, tingling.     Of note, upon initial arrival to the ED, a stroke code was called. Neurological exam WNL without any focal deficits/findings at that time. CTH noncon (7/17) performed revealing no evidence of acute intracranial hemorrhage or territorial infarction. CTA head/neck (7/17) without evidence of vasooclusive disease. Initial NIHSS score. Patient was given symptomatic treatment with fluids, meclizine, zofran.     In the ED,  VS: T 98.4 F, /79, HR 62, RR 17, SpO2 95% RA  Labs: Largely unremarkable. Glu 189, Neg Trop  UA: Hazy yellow, 30 protein, trace LE, neg nitrites, neg bacteria   EKG: Sinus rhythm, left-axis deviation  Imaging: CTH noncon (7/17) showing no acute intracranial hemorrhage/territorial infarction. Age appropriate volume loss with chronic small vessel ischemic disease. CTA head (7/17) showing no vasocclusive disease.   Orders: Zofran x1, Meclizine x1, 1 L NS

## 2020-07-17 NOTE — H&P ADULT - NSICDXPASTMEDICALHX_GEN_ALL_CORE_FT
PAST MEDICAL HISTORY:  Atherosclerosis of coronary artery s/p PCI    Breast cancer in female Left    Diverticulitis of colon Diverticulitis    Essential hypertension HTN (hypertension)    Gastroesophageal reflux disease GERD (gastroesophageal reflux disease)    Hyperlipidemia HLD (hyperlipidemia) PAST MEDICAL HISTORY:  Atherosclerosis of coronary artery s/p PCI    Breast cancer in female Left    Diverticulosis     Essential hypertension HTN (hypertension)    Gastroesophageal reflux disease GERD (gastroesophageal reflux disease)    Hyperlipidemia HLD (hyperlipidemia)    Migraine

## 2020-07-17 NOTE — ED PROVIDER NOTE - CLINICAL SUMMARY MEDICAL DECISION MAKING FREE TEXT BOX
80y Female w/ PMH HTN, HLD, CAD, depression coming in with dizziness associated with movement, last normal 11pm. Stroke code called immediately upon arrival.  CT head and perfusion with no acute events. CTA/CTP with no acute findings.  Initial NIHSS 0  Suspect peripheral vertigo given symptoms with movement and relatively normal blood pressure. Pt given IVFs, zofran and nausea, Neuro team signed off. D/w pt's children, pt not a safe discharge, still very nausea and dizzy with slight movement. Hx and exam not c/w ACS, ekg and trop neg. pt with urinary frequency however UA unremarkable so will not treat. UCx sent. Will admit to medicine for persistent severe vertiginous symptoms, pt stable for RMF. Family aware.

## 2020-07-17 NOTE — H&P ADULT - ASSESSMENT
79 yo F with PMHx of HTN, CAD (1 stent), Diverticulosis, Breast ca (s/p lumpectomy, RT, now in remission), HLD, acid reflux,  depression/anxiety BIBEMS to [facility] ED following 2 episodes of dizziness that occurred overnight at home associated with nausea, no LOC/no falls. Patient being admitted to [facility] for assessment/mgmt of dizziness 2/2 vertigo vs other neurological or cardiac cause. 79 yo F with PMHx of HTN, CAD (1 stent), Diverticulosis, Breast ca (s/p lumpectomy, RT, now in remission), HLD, acid reflux,  depression/anxiety BIBEMS to [facility] ED following 2 episodes of dizziness that occurred overnight at home associated with nausea, no LOC/no falls. Patient being admitted to [facility] for assessment/mgmt of dizziness 2/2 vertigo vs BPPV vs medication side effect (gabapentin, asa) or other neurological or cardiac cause.

## 2020-07-17 NOTE — ED ADULT NURSE NOTE - OBJECTIVE STATEMENT
Pt presents reporting dizziness and nausea since waking up at 2:30 am. Pt states she went to sleep feeling well at 2300. Pt reports hx of intermittent dizziness, nausea, and heart palpitations over the last 3 days. PT reports daily asa. PT elicits worsening symptoms with movement especially changing from a flat HOB to elevated HOB. NIH 0 on arrival, stroke code initiated by triage.

## 2020-07-17 NOTE — H&P ADULT - NSHPREVIEWOFSYSTEMS_GEN_ALL_CORE
REVIEW OF SYSTEMS:  CONSTITUTIONAL: No fevers or chills  EYES/ENT: No visual changes  NECK: No pain or stiffness  RESPIRATORY: No cough, wheezing, hemoptysis; No shortness of breath  CARDIOVASCULAR: (+) Palpitations   GASTROINTESTINAL: No abdominal or epigastric pain. No nausea, vomiting, or hematemesis; No diarrhea or constipation. No melena or hematochezia.  GENITOURINARY: (+) Increased urinary frequency, No dysuria or hematuria  NEUROLOGICAL: No numbness or tingling

## 2020-07-17 NOTE — H&P ADULT - NSICDXPASTSURGICALHX_GEN_ALL_CORE_FT
PAST SURGICAL HISTORY:  History of heart artery stent     History of lumpectomy of left breast     Other postprocedural status S/P appendectomy    Status post total hysterectomy S/P hysterectomy

## 2020-07-17 NOTE — H&P ADULT - PROBLEM SELECTOR PLAN 2
Patient states noticing episodes of "heart racing" that have occurred over the past two days prior to admission. She states she intermittently feels her heart beating very fast and these episodes resolve on their own, patient does not take any medication for it, not associated with chest pain, SOB, lightheadedness. EKG on admission showing sinus tach, left axis deviation, no ischemic changes.   - Monitor vitals  - Consider repeat EKG if pt complaining of palpitations Patient states noticing episodes of "heart racing" that have occurred over the past two days prior to admission. She states she intermittently feels her heart beating very fast and these episodes resolve on their own, patient does not take any medication for it, not associated with chest pain, SOB, lightheadedness. EKG on admission showing sinus tach, left axis deviation, no ischemic changes. Neg trop x1.   - Monitor vitals  - Consider repeat EKG if pt complaining of palpitations Patient states noticing episodes of "heart racing" that have occurred over the past two days prior to admission. She states she intermittently feels her heart beating very fast and these episodes resolve on their own, patient does not take any medication for it, not associated with chest pain, SOB, lightheadedness. EKG on admission showing sinus rhythm, left axis deviation, no ischemic changes. Neg trop x1.   - Monitor vitals  - Consider repeat EKG if pt complaining of palpitations

## 2020-07-17 NOTE — ED ADULT NURSE NOTE - EXTENSIONS OF SELF_ADULT
Requested Prescriptions     Pending Prescriptions Disp Refills    mometasone-formoterol (DULERA) 100-5 mcg/actuation HFA inhaler 1 Inhaler 3     Sig: Take 2 Puffs by inhalation two (2) times a day.        Last Office Visit: 11.15.17    Upcoming Appointment: 12.15.17 None

## 2020-07-18 ENCOUNTER — TRANSCRIPTION ENCOUNTER (OUTPATIENT)
Age: 80
End: 2020-07-18

## 2020-07-18 LAB
ANION GAP SERPL CALC-SCNC: 11 MMOL/L — SIGNIFICANT CHANGE UP (ref 5–17)
BUN SERPL-MCNC: 11 MG/DL — SIGNIFICANT CHANGE UP (ref 7–23)
CALCIUM SERPL-MCNC: 9.7 MG/DL — SIGNIFICANT CHANGE UP (ref 8.4–10.5)
CHLORIDE SERPL-SCNC: 102 MMOL/L — SIGNIFICANT CHANGE UP (ref 96–108)
CO2 SERPL-SCNC: 28 MMOL/L — SIGNIFICANT CHANGE UP (ref 22–31)
CREAT SERPL-MCNC: 0.6 MG/DL — SIGNIFICANT CHANGE UP (ref 0.5–1.3)
CULTURE RESULTS: SIGNIFICANT CHANGE UP
GLUCOSE SERPL-MCNC: 88 MG/DL — SIGNIFICANT CHANGE UP (ref 70–99)
HCT VFR BLD CALC: 41.3 % — SIGNIFICANT CHANGE UP (ref 34.5–45)
HGB BLD-MCNC: 13 G/DL — SIGNIFICANT CHANGE UP (ref 11.5–15.5)
MAGNESIUM SERPL-MCNC: 2.2 MG/DL — SIGNIFICANT CHANGE UP (ref 1.6–2.6)
MCHC RBC-ENTMCNC: 29 PG — SIGNIFICANT CHANGE UP (ref 27–34)
MCHC RBC-ENTMCNC: 31.5 GM/DL — LOW (ref 32–36)
MCV RBC AUTO: 92.2 FL — SIGNIFICANT CHANGE UP (ref 80–100)
NRBC # BLD: 0 /100 WBCS — SIGNIFICANT CHANGE UP (ref 0–0)
PHOSPHATE SERPL-MCNC: 3.3 MG/DL — SIGNIFICANT CHANGE UP (ref 2.5–4.5)
PLATELET # BLD AUTO: 170 K/UL — SIGNIFICANT CHANGE UP (ref 150–400)
POTASSIUM SERPL-MCNC: 3.7 MMOL/L — SIGNIFICANT CHANGE UP (ref 3.5–5.3)
POTASSIUM SERPL-SCNC: 3.7 MMOL/L — SIGNIFICANT CHANGE UP (ref 3.5–5.3)
RBC # BLD: 4.48 M/UL — SIGNIFICANT CHANGE UP (ref 3.8–5.2)
RBC # FLD: 14.1 % — SIGNIFICANT CHANGE UP (ref 10.3–14.5)
SODIUM SERPL-SCNC: 141 MMOL/L — SIGNIFICANT CHANGE UP (ref 135–145)
SPECIMEN SOURCE: SIGNIFICANT CHANGE UP
WBC # BLD: 5.65 K/UL — SIGNIFICANT CHANGE UP (ref 3.8–10.5)
WBC # FLD AUTO: 5.65 K/UL — SIGNIFICANT CHANGE UP (ref 3.8–10.5)

## 2020-07-18 PROCEDURE — 99233 SBSQ HOSP IP/OBS HIGH 50: CPT | Mod: GC

## 2020-07-18 RX ORDER — MECLIZINE HCL 12.5 MG
12.5 TABLET ORAL
Refills: 0 | Status: DISCONTINUED | OUTPATIENT
Start: 2020-07-18 | End: 2020-07-19

## 2020-07-18 RX ORDER — ACETAMINOPHEN 500 MG
650 TABLET ORAL ONCE
Refills: 0 | Status: COMPLETED | OUTPATIENT
Start: 2020-07-18 | End: 2020-07-18

## 2020-07-18 RX ADMIN — Medication 81 MILLIGRAM(S): at 12:08

## 2020-07-18 RX ADMIN — AMLODIPINE BESYLATE 5 MILLIGRAM(S): 2.5 TABLET ORAL at 06:34

## 2020-07-18 RX ADMIN — ATORVASTATIN CALCIUM 20 MILLIGRAM(S): 80 TABLET, FILM COATED ORAL at 21:23

## 2020-07-18 RX ADMIN — ENOXAPARIN SODIUM 40 MILLIGRAM(S): 100 INJECTION SUBCUTANEOUS at 19:32

## 2020-07-18 RX ADMIN — Medication 650 MILLIGRAM(S): at 19:32

## 2020-07-18 RX ADMIN — PANTOPRAZOLE SODIUM 40 MILLIGRAM(S): 20 TABLET, DELAYED RELEASE ORAL at 06:34

## 2020-07-18 RX ADMIN — POLYETHYLENE GLYCOL 3350 17 GRAM(S): 17 POWDER, FOR SOLUTION ORAL at 12:07

## 2020-07-18 RX ADMIN — Medication 650 MILLIGRAM(S): at 20:22

## 2020-07-18 RX ADMIN — Medication 12.5 MILLIGRAM(S): at 19:32

## 2020-07-18 RX ADMIN — DULOXETINE HYDROCHLORIDE 60 MILLIGRAM(S): 30 CAPSULE, DELAYED RELEASE ORAL at 12:07

## 2020-07-18 NOTE — PROGRESS NOTE ADULT - PROBLEM SELECTOR PLAN 6
Patient with history of diverticulosis. Follows with Dr. Casanova (GI). Per patient, last colonoscopy performed approx 3 years prior, showed presence of diverticulosis and pt reports having 3 polyps removed at that time, all benign. She denies any hematochezia, melena. Patient states that is she to have repeat colonoscopy this year.   - Miralax/senna for constipation   - No other acute intervention needed at this time  - Monitor vitals/CBC Patient with history of HTN. BP on admission 153/79. Patient did not take any of her medications today prior to coming to the ED.  - c/w amlodipine 5mg daily

## 2020-07-18 NOTE — PROGRESS NOTE ADULT - SUBJECTIVE AND OBJECTIVE BOX
INTERVAL HPI/OVERNIGHT EVENTS:  As per night team, no overnight events. Patient seen and examined at bedside. States that her headache is slightly less intense since admission, but does have "tightness" around the left cheek. Admits that she has not ambulated because she Patient denies fever, chills, dizziness, weakness, HA, CP, SOB, N/V/D/C, changes in bowel movements, LE swelling. 12pt ROS otherwise negative.    VITALS  Vital Signs Last 24 Hrs  T(C): 36.8 (2020 09:48), Max: 36.9 (2020 15:40)  T(F): 98.2 (2020 09:48), Max: 98.5 (2020 15:40)  HR: 66 (2020 05:42) (63 - 75)  BP: 121/76 (2020 05:42) (116/75 - 139/81)  BP(mean): --  RR: 18 (2020 09:48) (18 - 19)  SpO2: 96% (2020 09:48) (95% - 96%)    CAPILLARY BLOOD GLUCOSE          PHYSICAL EXAM  General: WDWN, NAD, comfortable, pleasant, anxious, agitated, Ill, Frail, Cachectic, Resp distress  HEENT: NC/AT, PERRL/ EOMI, no scleral icterus, MMM, good dentition  Neck: Supple; no JVD  Respiratory: CTA B/L, no wheezes/crackles   Cardiovascular: Regular rhythm/rate; +S1 +S2  Gastrointestinal: Soft, NTND, normoactive BS, no rebound, no guarding, no suprapubic tenderness  Extremities: warm, well perfused, no cyanosis, no clubbing, no edema, pulses equal  Neurological: A&Ox3, CNII-XII grossly intact, no obvious focal deficits, follows commands  Skin: Normal temperature, warm, dry    MEDICATIONS  (STANDING):  amLODIPine   Tablet 5 milliGRAM(s) Oral daily  aspirin enteric coated 81 milliGRAM(s) Oral daily  atorvastatin 20 milliGRAM(s) Oral at bedtime  DULoxetine 60 milliGRAM(s) Oral daily  enoxaparin Injectable 40 milliGRAM(s) SubCutaneous every 24 hours  meclizine 12.5 milliGRAM(s) Oral two times a day  pantoprazole    Tablet 40 milliGRAM(s) Oral before breakfast  polyethylene glycol 3350 17 Gram(s) Oral daily  senna 2 Tablet(s) Oral at bedtime    MEDICATIONS  (PRN):  ondansetron Injectable 4 milliGRAM(s) IV Push every 6 hours PRN Nausea and/or Vomiting      Cipro (Hives)  Flagyl (Hives)  lactose (Unknown)  NSAIDs (Unknown)      LABS                        13.0   5.65  )-----------( 170      ( 2020 08:09 )             41.3     07-18    141  |  102  |  11  ----------------------------<  88  3.7   |  28  |  0.60    Ca    9.7      2020 08:09  Phos  3.3     07-18  Mg     2.2     07-18    TPro  7.0  /  Alb  3.6  /  TBili  0.4  /  DBili  x   /  AST  17  /  ALT  12  /  AlkPhos  76  07-17    PT/INR - ( 2020 09:14 )   PT: 12.6 sec;   INR: 1.05          PTT - ( 2020 09:14 )  PTT:30.2 sec  Urinalysis Basic - ( 2020 10:01 )    Color: Yellow / Appearance: Hazy / S.015 / pH: x  Gluc: x / Ketone: NEGATIVE  / Bili: Negative / Urobili: 0.2 E.U./dL   Blood: x / Protein: 30 mg/dL / Nitrite: NEGATIVE   Leuk Esterase: Trace / RBC: < 5 /HPF / WBC < 5 /HPF   Sq Epi: x / Non Sq Epi: 0-5 /HPF / Bacteria: None /HPF      CARDIAC MARKERS ( 2020 09:14 )  x     / <0.01 ng/mL / x     / x     / x            RADIOLOGY & ADDITIONAL TESTS: Reviewed INTERVAL HPI/OVERNIGHT EVENTS:  As per night team, no overnight events. Patient seen and examined at bedside. States that her headache is slightly less intense since admission, but does have "tightness" around the left cheek. Admits that she has not ambulated because she fears her dizziness would come again, which entails the room spinning in circles. When asked,  Patient denies fever, chills, dizziness, weakness, HA, CP, SOB, N/V/D/C, changes in bowel movements, LE swelling. 12pt ROS otherwise negative.    VITALS  Vital Signs Last 24 Hrs  T(C): 36.8 (2020 09:48), Max: 36.9 (2020 15:40)  T(F): 98.2 (2020 09:48), Max: 98.5 (2020 15:40)  HR: 66 (2020 05:42) (63 - 75)  BP: 121/76 (2020 05:42) (116/75 - 139/81)  BP(mean): --  RR: 18 (2020 09:48) (18 - 19)  SpO2: 96% (2020 09:48) (95% - 96%)    CAPILLARY BLOOD GLUCOSE          PHYSICAL EXAM  General: WDWN, NAD, comfortable, pleasant, anxious, agitated, Ill, Frail, Cachectic, Resp distress  HEENT: NC/AT, PERRL/ EOMI, no scleral icterus, MMM, good dentition  Neck: Supple; no JVD  Respiratory: CTA B/L, no wheezes/crackles   Cardiovascular: Regular rhythm/rate; +S1 +S2  Gastrointestinal: Soft, NTND, normoactive BS, no rebound, no guarding, no suprapubic tenderness  Extremities: warm, well perfused, no cyanosis, no clubbing, no edema, pulses equal  Neurological: A&Ox3, CNII-XII grossly intact, no obvious focal deficits, follows commands  Skin: Normal temperature, warm, dry    MEDICATIONS  (STANDING):  amLODIPine   Tablet 5 milliGRAM(s) Oral daily  aspirin enteric coated 81 milliGRAM(s) Oral daily  atorvastatin 20 milliGRAM(s) Oral at bedtime  DULoxetine 60 milliGRAM(s) Oral daily  enoxaparin Injectable 40 milliGRAM(s) SubCutaneous every 24 hours  meclizine 12.5 milliGRAM(s) Oral two times a day  pantoprazole    Tablet 40 milliGRAM(s) Oral before breakfast  polyethylene glycol 3350 17 Gram(s) Oral daily  senna 2 Tablet(s) Oral at bedtime    MEDICATIONS  (PRN):  ondansetron Injectable 4 milliGRAM(s) IV Push every 6 hours PRN Nausea and/or Vomiting      Cipro (Hives)  Flagyl (Hives)  lactose (Unknown)  NSAIDs (Unknown)      LABS                        13.0   5.65  )-----------( 170      ( 2020 08:09 )             41.3     07-18    141  |  102  |  11  ----------------------------<  88  3.7   |  28  |  0.60    Ca    9.7      2020 08:09  Phos  3.3     07-18  Mg     2.2     07-18    TPro  7.0  /  Alb  3.6  /  TBili  0.4  /  DBili  x   /  AST  17  /  ALT  12  /  AlkPhos  76  07-17    PT/INR - ( 2020 09:14 )   PT: 12.6 sec;   INR: 1.05          PTT - ( 2020 09:14 )  PTT:30.2 sec  Urinalysis Basic - ( 2020 10:01 )    Color: Yellow / Appearance: Hazy / S.015 / pH: x  Gluc: x / Ketone: NEGATIVE  / Bili: Negative / Urobili: 0.2 E.U./dL   Blood: x / Protein: 30 mg/dL / Nitrite: NEGATIVE   Leuk Esterase: Trace / RBC: < 5 /HPF / WBC < 5 /HPF   Sq Epi: x / Non Sq Epi: 0-5 /HPF / Bacteria: None /HPF      CARDIAC MARKERS ( 2020 09:14 )  x     / <0.01 ng/mL / x     / x     / x            RADIOLOGY & ADDITIONAL TESTS: Reviewed INTERVAL HPI/OVERNIGHT EVENTS:  As per night team, no overnight events. Patient seen and examined at bedside. States that her headache is slightly less intense since admission, but does have "tightness" around the left cheek. Admits that she has not ambulated because she fears her dizziness would come again, which entails the room spinning in circles. When asked to rotate her head laterally both ways, patient admits that the room is spinning. Patient denies fever, chills, weakness, CP, SOB, N/V/D/C, changes in bowel movements, LE swelling. 12pt ROS otherwise negative.    VITALS  Vital Signs Last 24 Hrs  T(C): 36.8 (2020 09:48), Max: 36.9 (2020 15:40)  T(F): 98.2 (2020 09:48), Max: 98.5 (2020 15:40)  HR: 66 (2020 05:42) (63 - 75)  BP: 121/76 (2020 05:42) (116/75 - 139/81)  BP(mean): --  RR: 18 (2020 09:48) (18 - 19)  SpO2: 96% (2020 09:48) (95% - 96%)    CAPILLARY BLOOD GLUCOSE      PHYSICAL EXAM  General: WDWN, NAD, comfortable in bed   HEENT: NC/AT, PERRL/ EOMI, no scleral icterus, MMM, good dentition  Neck: Supple; no JVD  Respiratory: CTA B/L, no wheezes/crackles   Cardiovascular: Regular rhythm/rate; +S1 +S2  Gastrointestinal: Soft, NTND, normoactive BS, no rebound, no guarding, no suprapubic tenderness  Extremities: warm, well perfused, no cyanosis, no clubbing, no edema, pulses equal  Neurological: A&Ox3, CNII-XII grossly intact, no obvious focal deficits, follows commands  Skin: Normal temperature, warm, dry    MEDICATIONS  (STANDING):  amLODIPine   Tablet 5 milliGRAM(s) Oral daily  aspirin enteric coated 81 milliGRAM(s) Oral daily  atorvastatin 20 milliGRAM(s) Oral at bedtime  DULoxetine 60 milliGRAM(s) Oral daily  enoxaparin Injectable 40 milliGRAM(s) SubCutaneous every 24 hours  meclizine 12.5 milliGRAM(s) Oral two times a day  pantoprazole    Tablet 40 milliGRAM(s) Oral before breakfast  polyethylene glycol 3350 17 Gram(s) Oral daily  senna 2 Tablet(s) Oral at bedtime    MEDICATIONS  (PRN):  ondansetron Injectable 4 milliGRAM(s) IV Push every 6 hours PRN Nausea and/or Vomiting      Cipro (Hives)  Flagyl (Hives)  lactose (Unknown)  NSAIDs (Unknown)      LABS                        13.0   5.65  )-----------( 170      ( 2020 08:09 )             41.3     07-18    141  |  102  |  11  ----------------------------<  88  3.7   |  28  |  0.60    Ca    9.7      2020 08:09  Phos  3.3     07-18  Mg     2.2     07-18    TPro  7.0  /  Alb  3.6  /  TBili  0.4  /  DBili  x   /  AST  17  /  ALT  12  /  AlkPhos  76  07-17    PT/INR - ( 2020 09:14 )   PT: 12.6 sec;   INR: 1.05          PTT - ( 2020 09:14 )  PTT:30.2 sec  Urinalysis Basic - ( 2020 10:01 )    Color: Yellow / Appearance: Hazy / S.015 / pH: x  Gluc: x / Ketone: NEGATIVE  / Bili: Negative / Urobili: 0.2 E.U./dL   Blood: x / Protein: 30 mg/dL / Nitrite: NEGATIVE   Leuk Esterase: Trace / RBC: < 5 /HPF / WBC < 5 /HPF   Sq Epi: x / Non Sq Epi: 0-5 /HPF / Bacteria: None /HPF      CARDIAC MARKERS ( 2020 09:14 )  x     / <0.01 ng/mL / x     / x     / x            RADIOLOGY & ADDITIONAL TESTS: Reviewed INTERVAL HPI/OVERNIGHT EVENTS:  As per night team, no overnight events. Patient seen and examined at bedside. States that her headache is slightly less intense since admission, but does have "tightness" around the left cheek. Admits that she has not ambulated because she fears her dizziness would come again, which entails the room spinning in circles. When asked to rotate her head laterally both ways, patient admits that the room is spinning. Patient denies fever, chills, weakness, CP, SOB, N/V/D/C, changes in bowel movements, LE swelling. 12pt ROS otherwise negative.    VITALS  Vital Signs Last 24 Hrs  T(C): 36.8 (2020 09:48), Max: 36.9 (2020 15:40)  T(F): 98.2 (2020 09:48), Max: 98.5 (2020 15:40)  HR: 66 (2020 05:42) (63 - 75)  BP: 121/76 (2020 05:42) (116/75 - 139/81)  BP(mean): --  RR: 18 (2020 09:48) (18 - 19)  SpO2: 96% (2020 09:48) (95% - 96%)    CAPILLARY BLOOD GLUCOSE      PHYSICAL EXAM  General: WDWN, NAD, comfortable in bed   HEENT: NC/AT, PERRL/ EOMI, no scleral icterus, MMM, good dentition  Neck: Supple; no JVD  Respiratory: CTA B/L, no wheezes/crackles   Cardiovascular: Regular rhythm/rate; +S1 +S2  Gastrointestinal: Soft, NTND, normoactive BS, no rebound, no guarding, no suprapubic tenderness  Extremities: warm, well perfused, no cyanosis, no clubbing, no edema, pulses equal  Neurological: A&Ox3, CNII-XII grossly intact, no obvious focal deficits, no pronator drift, no dysmetria, no disdiadochokinesia  Skin: Normal temperature, warm, dry    MEDICATIONS  (STANDING):  amLODIPine   Tablet 5 milliGRAM(s) Oral daily  aspirin enteric coated 81 milliGRAM(s) Oral daily  atorvastatin 20 milliGRAM(s) Oral at bedtime  DULoxetine 60 milliGRAM(s) Oral daily  enoxaparin Injectable 40 milliGRAM(s) SubCutaneous every 24 hours  meclizine 12.5 milliGRAM(s) Oral two times a day  pantoprazole    Tablet 40 milliGRAM(s) Oral before breakfast  polyethylene glycol 3350 17 Gram(s) Oral daily  senna 2 Tablet(s) Oral at bedtime    MEDICATIONS  (PRN):  ondansetron Injectable 4 milliGRAM(s) IV Push every 6 hours PRN Nausea and/or Vomiting      Cipro (Hives)  Flagyl (Hives)  lactose (Unknown)  NSAIDs (Unknown)      LABS                        13.0   5.65  )-----------( 170      ( 2020 08:09 )             41.3     07-18    141  |  102  |  11  ----------------------------<  88  3.7   |  28  |  0.60    Ca    9.7      2020 08:09  Phos  3.3     07-18  Mg     2.2     07-18    TPro  7.0  /  Alb  3.6  /  TBili  0.4  /  DBili  x   /  AST  17  /  ALT  12  /  AlkPhos  76  07-17    PT/INR - ( 2020 09:14 )   PT: 12.6 sec;   INR: 1.05          PTT - ( 2020 09:14 )  PTT:30.2 sec  Urinalysis Basic - ( 2020 10:01 )    Color: Yellow / Appearance: Hazy / S.015 / pH: x  Gluc: x / Ketone: NEGATIVE  / Bili: Negative / Urobili: 0.2 E.U./dL   Blood: x / Protein: 30 mg/dL / Nitrite: NEGATIVE   Leuk Esterase: Trace / RBC: < 5 /HPF / WBC < 5 /HPF   Sq Epi: x / Non Sq Epi: 0-5 /HPF / Bacteria: None /HPF      CARDIAC MARKERS ( 2020 09:14 )  x     / <0.01 ng/mL / x     / x     / x            RADIOLOGY & ADDITIONAL TESTS: Reviewed

## 2020-07-18 NOTE — PROGRESS NOTE ADULT - PROBLEM SELECTOR PROBLEM 4
This writer spoke with Dorothy.  She said the medication list can be sent via [facility].     Hyperlipidemia Coronary artery disease

## 2020-07-18 NOTE — PROGRESS NOTE ADULT - PROBLEM SELECTOR PLAN 2
Patient states noticing episodes of "heart racing" that have occurred over the past two days prior to admission. She states she intermittently feels her heart beating very fast and these episodes resolve on their own, patient does not take any medication for it, not associated with chest pain, SOB, lightheadedness. EKG on admission showing sinus rhythm, left axis deviation, no ischemic changes. Neg trop x1.   - Monitor vitals  - Consider repeat EKG if pt complaining of palpitations Patient states noticing episodes of "heart racing" that have occurred over the past two days prior to admission. She states she intermittently feels her heart beating very fast and these episodes resolve on their own, patient does not take any medication for it, not associated with chest pain, SOB, lightheadedness. EKG on admission showing sinus rhythm, left axis deviation, no ischemic changes. Neg trop x1.   - Monitor vitals, patient normal sinus rate since admission  - Repeat EKG if pt complaining of palpitations

## 2020-07-18 NOTE — PROGRESS NOTE ADULT - PROBLEM SELECTOR PLAN 3
Patient without any recent history of fevers, cough, SOB, diarrhea, anosmia. She denies any sick contacts or recent travel. Low suspicion for COVID-19 infxn at this time.   - f/u COVID-PCR Patient with history of hyperlipidemia.   - c/w home meds (atorvastatin 20mg)

## 2020-07-18 NOTE — DISCHARGE NOTE PROVIDER - NSDCFUSCHEDAPPT_GEN_ALL_CORE_FT
STELLA CURRIE ; 08/13/2020 ; [facility] [address] STELLA CURRIE ; 08/13/2020 ; [facility] [address] STELLA CURRIE ; 08/13/2020 ; [facility] [address] STELLA CURRIE ; 08/13/2020 ; [facility] [address] STELLA CURRIE ; 08/13/2020 ; [facility] [address]

## 2020-07-18 NOTE — PROGRESS NOTE ADULT - ATTENDING COMMENTS
Patient seen and examined at bedside. Agree with the history, physical exam, assessment, and plan as outlined above with the following addendum. Briefly patient is a 81 yo F with PMHx of HTN, CAD (1 stent), Diverticulosis, Breast ca (s/p lumpectomy, RT, now in remission), HLD, acid reflux, depression/anxiety BIBEMS    On physical exam: A0X3, no focal deficits, CN II -VII intact, no nystagnmus     Dizziness: resolving  CT head and CT angio head/neck neg.  Labs wnl.  VSS. No orthostatic hypotension, no sign of infection   Likely BPPV, with improvement in symptoms with meclizine  Tylenol prn headache   PT eval  Likely dc tomorrow     HTN: stable on amlodipine

## 2020-07-18 NOTE — PROGRESS NOTE ADULT - PROBLEM SELECTOR PLAN 7
Patient with history of HTN. BP on admission 153/79. Patient did not take any of her medications today prior to coming to the ED.  - c/w home medication (amlodipine 5mg QD) Patient with history of depression/anxiety.   - c/w duloxetine 60 mg PO QD

## 2020-07-18 NOTE — PROGRESS NOTE ADULT - PROBLEM SELECTOR PLAN 1
Patient reports 2 episodes of dizziness associated with nausea, feeling like the room is spinning. No history of prior episodes. No falls, no LOC. Upon presentation, stroke code was called. Neurological exam normal with no evidence of focal findings/deficits. NIHSS score 0.  CTH noncon (7/17) showing no evidence of acute intracranial hemorrhage or infarction. CTA head (7/17) showing no evidence of occlusion. Patient was given fluids, zofran, and meclizine in the ED. On physical exam, patient reports decreased hearing on R compared to L. Slight pronator drift on R side. Stuart-Hallpike maneuver with no nystagmus however increased dizziness with head movement. Current consideration for dizziness 2/2 vertigo vs BPPV vs medication side effect (gabapentin, aspirin) vs other cardiac causes.   - currently holding pt home gabapentin   - fall precautions in place   - monitor vitals  - consider orthostatics once pt more comfortable however pt received fluids in ED  - Zofran PRN for nausea/vomiting ()   - PT consult when dizziness improves Patient reports 2 episodes of dizziness associated with nausea, feeling like the room is spinning. No prior episodes. No falls, no LOC. s/p Stroke Code with NIHSS score 0, nl neurological exam. Reports decr hearing on R vs L. Slight pronator drift on R side.   - CT Head: no acute intracranial hemorrhage or infarction.   - [place]-Hallpike maneuver w/o nystagmus but increased dizziness   - s/p NS 1L bolus, zofran, and meclizine 25 mgx1 in the ED  - negative orthostatics test  - c/w meclizine 12.5 mg PO BID   - Unlikely gabapentin is the culprit as patient states her dosage was reduced by neurologist prior to dizziness onset  - fall precautions in place   - monitor vitals  - PT consult when dizziness improves

## 2020-07-18 NOTE — DISCHARGE NOTE PROVIDER - CARE PROVIDER_API CALL
Wiley Stevens  CARDIOVASCULAR DISEASE  [address] [place] [address]  Phone: [phone]  Fax: [phone]  Follow Up Time:

## 2020-07-18 NOTE — PROGRESS NOTE ADULT - PROBLEM SELECTOR PLAN 8
Patient with history of depression/anxiety.   - c/w home duloxetine 60 mg PO QD F: no IVF  E: K>4 Mg>2, monitor and replete as needed    N: DASH/TLC diet with milk allergy restrictions    DVT ppx: Lovenox 40mg QD  GI ppx: Protonix 40 mg PO     D: RMF  FULL CODE

## 2020-07-18 NOTE — DISCHARGE NOTE PROVIDER - NSDCMRMEDTOKEN_GEN_ALL_CORE_FT
amLODIPine 5 mg oral tablet: 1 tab(s) orally once a day  Aspirin Enteric Coated 81 mg oral delayed release tablet: 1 tab(s) orally once a day  atenolol 50 mg oral tablet: 1 tab(s) orally once a day  Crestor 5 mg oral tablet: 1 tab(s) orally once a day (at bedtime)  Cymbalta 60 mg oral delayed release capsule: 1 cap(s) orally once a day  famotidine 20 mg oral tablet: 1 tab(s) orally once a day  gabapentin 100 mg oral capsule: 1 cap(s) orally 2 times a day  Melatonin 1 mg oral tablet: 1 tab(s) orally once a day (at bedtime)  nortriptyline 10 mg oral capsule: 1 cap(s) orally 2 times a day  raNITIdine 150 mg oral capsule: 1 cap(s) orally once a day  Vitamin B12 100 mcg oral tablet: 1 tab(s) orally once a day amLODIPine 5 mg oral tablet: 1 tab(s) orally once a day  Aspirin Enteric Coated 81 mg oral delayed release tablet: 1 tab(s) orally once a day  atenolol 50 mg oral tablet: 1 tab(s) orally once a day  Crestor 5 mg oral tablet: 1 tab(s) orally once a day (at bedtime)  Cymbalta 60 mg oral delayed release capsule: 1 cap(s) orally once a day  famotidine 20 mg oral tablet: 1 tab(s) orally once a day  gabapentin 100 mg oral capsule: 1 cap(s) orally 2 times a day  meclizine 12.5 mg oral tablet: 1 tab(s) orally 2 times a day as needed for diziness   nortriptyline 10 mg oral capsule: 1 cap(s) orally 2 times a day  [facility] : V[facility]ibular Rehab

## 2020-07-18 NOTE — PROGRESS NOTE ADULT - PROBLEM SELECTOR PLAN 4
Patient with history of hyperlipidemia.   - c/w home meds (atorvastatin 20mg) Patient with history of CAD, s/p 1 stent placed.   - c/w aspirin 81 mg

## 2020-07-18 NOTE — PROGRESS NOTE ADULT - ASSESSMENT
81 yo F with PMHx of HTN, CAD (1 stent), Diverticulosis, Breast ca (s/p lumpectomy, RT, now in remission), HLD, acid reflux, depression/anxiety BIBEMS to [facility] ED following 2 episodes of dizziness that occurred overnight at home associated with nausea, no LOC/no falls. Patient being admitted for dizziness likely 2/2 BPPV.

## 2020-07-18 NOTE — DISCHARGE NOTE PROVIDER - NSDCCPCAREPLAN_GEN_ALL_CORE_FT
PRINCIPAL DISCHARGE DIAGNOSIS  Diagnosis: Vertigo  Assessment and Plan of Treatment: Your dizziness is most likely vertigo due to a condition called Benign Paroxysmal Positional Vertigo (BPPV). Vertigo is a type of dizziness that makes you feel like you are spinning, swaying, or tilting, or like the room is moving around you. These feelings come and go, and might last seconds, hours, or days. You might feel worse when you move your head, change positions, cough, or sneeze. We ruled out organic causes by checking your bloodwork and CT image of the head. There was no acute bleed and no blockage in blood flow to the brain. Your electrolytes were in the normal laboratory ranges. We likely think you present with BPPV because the [place]-Hallpike maneuver was relatively positive. We are prescribing you with Meclizine tablets to help reduce some dizziness. Physical therapy also saw you and recommends ****. Please discuss with your Neurologist on your gabapentin regimen and adjust the dose and frequency as needed. We did not resume your medication in the hospital as we were initially unsure if it could have caused your dizziness. However, our suspicion remains low since you stated the dosage was reduced prior to coming to the emergency department.      SECONDARY DISCHARGE DIAGNOSES  Diagnosis: Palpitations  Assessment and Plan of Treatment: You noticed episodes of "heart racing" that have occurred over the past two days prior to admission. You said your heart beats very fast and these episodes resolve on their own. You do not take any medications for these symptoms and they are not associated with chest pain, shortness of breath, lightheadedness. EKG on admission showing sinus rhythm, left axis deviation, no ischemic changes. Your troponin level was negative, which means no insult to the heart.  We continued to monitor vitals which were relatively stable. No intervention indicated at this time. Please address this concern with your PCP if you still experience symptoms.    Diagnosis: Diverticulosis  Assessment and Plan of Treatment: You have a history of diverticulosis. You denied any bloody stools. You are reportedly scheduled for a repeat colonoscopy this year. Please continue taking miralax and/or senna for constipation.        Diagnosis: Hyperlipidemia  Assessment and Plan of Treatment: You have a history of high cholesterol,. Continue to take your atorvastatin 20 mg.    Diagnosis: Hypertension  Assessment and Plan of Treatment: You have a history of high blood pressure. Continue to take amlodipine 5 mg daily.    Diagnosis: Coronary artery disease  Assessment and Plan of Treatment: You have a history of coronary artery disease. Continue taking aspirin 81 mg daily. PRINCIPAL DISCHARGE DIAGNOSIS  Diagnosis: Vertigo  Assessment and Plan of Treatment: Your dizziness is most likely vertigo due to a condition called Benign Paroxysmal Positional Vertigo (BPPV). Vertigo is a type of dizziness that makes you feel like you are spinning, swaying, or tilting, or like the room is moving around you. These feelings come and go, and might last seconds, hours, or days. You might feel worse when you move your head, change positions, cough, or sneeze. We ruled out organic causes by checking your bloodwork and CT image of the head. There was no acute bleed and no blockage in blood flow to the brain. Your electrolytes were in the normal laboratory ranges. Your blood pressure and heart rate responded appropriately when the nurses had you lie flat, sit in a chair, and stand. We likely think you present with BPPV because the [place]-Hallpike maneuver was relatively positive. We are prescribing you with Meclizine tablets to help reduce some dizziness. Physical therapy also saw you and recommends ****. Please discuss with your Neurologist on your gabapentin regimen and adjust the dose and frequency as needed. We did not resume your medication in the hospital as we were initially unsure if it could have caused your dizziness. However, our suspicion remains low since you stated the dosage was reduced prior to coming to the emergency department.      SECONDARY DISCHARGE DIAGNOSES  Diagnosis: Palpitations  Assessment and Plan of Treatment: You noticed episodes of "heart racing" that have occurred over the past two days prior to admission. You said your heart beats very fast and these episodes resolve on their own. You do not take any medications for these symptoms and they are not associated with chest pain, shortness of breath, lightheadedness. EKG on admission showing sinus rhythm, left axis deviation, no ischemic changes. Your troponin level was negative, which means no insult to the heart.  We continued to monitor vitals which were relatively stable. No intervention indicated at this time. Please address this concern with your PCP if you still experience symptoms.    Diagnosis: Diverticulosis  Assessment and Plan of Treatment: You have a history of diverticulosis. You denied any bloody stools. You are reportedly scheduled for a repeat colonoscopy this year. Please continue taking miralax and/or senna for constipation.        Diagnosis: Hyperlipidemia  Assessment and Plan of Treatment: You have a history of high cholesterol,. Continue to take your atorvastatin 20 mg.    Diagnosis: Hypertension  Assessment and Plan of Treatment: You have a history of high blood pressure. Continue to take amlodipine 5 mg daily.    Diagnosis: Coronary artery disease  Assessment and Plan of Treatment: You have a history of coronary artery disease. Continue taking aspirin 81 mg daily.

## 2020-07-18 NOTE — DISCHARGE NOTE PROVIDER - HOSPITAL COURSE
#Discharge: do not delete        Patient is 81 yo F with past medical history of HTN, CAD s/p stent, diverticulosis, breast cancer (s/p lumpectomy and RT), HLD, GERD, depression/anxiety    Presented with 2 episodes of dizziness overnight with nausea and no LOC/no falls, found to have dizziness likely 2/2 BPPV.         Problem List/Main Diagnoses (system-based):     1. Dizziness: meclizine 12.5 mg PO BID    2. Palpitation: NSR since admission, no treatment indicated. Negative troponin x1.     3. Hyperlipidemia: atorvastatin 20 mg PO    4. Coronary artery disease: aspirin 81 mg PO    5. Diverticulosis hx: miralax/senna for constipation    6. Hypertension: amlodipine 5 mg PO daily    7. Depression: duloxetine 60 mg PO daily        Inpatient treatment course:     -7/18: s/p NS 1L bolus, zofran, and meclizine 25 mg x1 in the ED. [place]-Hallpike maneuver w/o nystagmus but increased dizziness. CT Head negative for acute intracranial hemorrhage or infarction.     -7/19: No orthostatic hypotension, patient continued to be afebrile and without leukocytosis. No nystagmus on repeat physical exam. Labs wnl. Started standing meclizine 12.5 mg PO BID.    -7/20: PT recs ****        New medications: Meclizine 12.5 mg PO BID for BPPV     Labs to be followed outpatient: None    Exam to be followed outpatient: None #Discharge: do not delete        Patient is 81 yo F with past medical history of HTN, CAD s/p stent, diverticulosis, breast cancer (s/p lumpectomy and RT), HLD, GERD, depression/anxiety    Presented with 2 episodes of dizziness overnight with nausea and no LOC/no falls, found to have dizziness likely 2/2 BPPV.         Problem List/Main Diagnoses (system-based):     1. Dizziness: meclizine 12.5 mg PO BID    2. Palpitation: NSR since admission, no treatment indicated. Negative troponin x1.     3. Hyperlipidemia: atorvastatin 20 mg PO    4. Coronary artery disease: aspirin 81 mg PO    5. Diverticulosis hx: miralax/senna for constipation    6. Hypertension: amlodipine 5 mg PO daily    7. Depression: duloxetine 60 mg PO daily        Inpatient treatment course:     -7/18: s/p NS 1L bolus, zofran, and meclizine 25 mg x1 in the ED. [place]-Hallpike maneuver w/o nystagmus but increased dizziness. CT Head negative for acute intracranial hemorrhage or infarction.     -7/19: No orthostatic hypotension, patient continued to be afebrile and without leukocytosis. No nystagmus on repeat physical exam. Labs wnl. Started standing meclizine 12.5 mg PO BID.    -7/20: PT recs ****        New medications: Meclizine 12.5 mg PO BID for BPPV and prescription for vestibular rehab     Labs to be followed outpatient: None    Exam to be followed outpatient: None #Discharge: do not delete        Patient is 81 yo F with past medical history of HTN, CAD s/p stent, diverticulosis, breast cancer (s/p lumpectomy and RT), HLD, GERD, depression/anxiety    Presented with 2 episodes of dizziness overnight with nausea and no LOC/no falls, found to have dizziness likely 2/2 BPPV.         Problem List/Main Diagnoses (system-based):     1. Dizziness: meclizine 12.5 mg PO BID    2. Palpitation: NSR since admission, no treatment indicated. Negative troponin x1.     3. Hyperlipidemia: atorvastatin 20 mg PO    4. Coronary artery disease: aspirin 81 mg PO    5. Diverticulosis hx: miralax/senna for constipation    6. Hypertension: amlodipine 5 mg PO daily    7. Depression: duloxetine 60 mg PO daily        Inpatient treatment course:     -7/18: s/p NS 1L bolus, zofran, and meclizine 25 mg x1 in the ED. [place]-Hallpike maneuver w/o nystagmus but increased dizziness. CT Head negative for acute intracranial hemorrhage or infarction.     -7/19: No orthostatic hypotension, patient continued to be afebrile and without leukocytosis. No nystagmus on repeat physical exam. Labs wnl. Started standing meclizine 12.5 mg PO BID.    -7/20: PT recs ****        New medications: Meclizine 12.5 mg PO BID for BPPV and prescription for vestibular rehab     Labs to be followed outpatient: None    Exam to be followed outpatient: None            Attending addendum    Patient seen and examined on day of discharge. Patient feeling improvement in symptoms. PT recommends home PT. Patient will be discharged on short course of meclizine ( caution on anti cholinergic side effects) with follow up with PCP, also recommend vestibular rehab.

## 2020-07-18 NOTE — PROGRESS NOTE ADULT - PROBLEM SELECTOR PLAN 5
Patient with history of CAD, s/p 1 stent placed.   - c/w aspirin 81 mg Patient with history of diverticulosis. She denies any hematochezia, melena. Patient states that is she to have repeat colonoscopy this year.   - Miralax/senna for constipation   - No other acute intervention needed at this time  - Monitor vitals/CBC

## 2020-07-18 NOTE — PROGRESS NOTE ADULT - PROBLEM SELECTOR PLAN 9
F: no IVF  E: K>4 Mg>2, monitor and replete as needed    N: DASH/TLC diet with milk allergy restrictions    DVT ppx: Lovenox 40mg QD  GI ppx: Protonix 40 mg PO     D: RMF    FULL CODE

## 2020-07-19 ENCOUNTER — TRANSCRIPTION ENCOUNTER (OUTPATIENT)
Age: 80
End: 2020-07-19

## 2020-07-19 VITALS — TEMPERATURE: 99 F | OXYGEN SATURATION: 97 % | RESPIRATION RATE: 18 BRPM

## 2020-07-19 LAB
ANION GAP SERPL CALC-SCNC: 11 MMOL/L — SIGNIFICANT CHANGE UP (ref 5–17)
BUN SERPL-MCNC: 17 MG/DL — SIGNIFICANT CHANGE UP (ref 7–23)
CALCIUM SERPL-MCNC: 9.7 MG/DL — SIGNIFICANT CHANGE UP (ref 8.4–10.5)
CHLORIDE SERPL-SCNC: 104 MMOL/L — SIGNIFICANT CHANGE UP (ref 96–108)
CO2 SERPL-SCNC: 27 MMOL/L — SIGNIFICANT CHANGE UP (ref 22–31)
CREAT SERPL-MCNC: 0.66 MG/DL — SIGNIFICANT CHANGE UP (ref 0.5–1.3)
GLUCOSE SERPL-MCNC: 107 MG/DL — HIGH (ref 70–99)
HCT VFR BLD CALC: 42.3 % — SIGNIFICANT CHANGE UP (ref 34.5–45)
HGB BLD-MCNC: 13.2 G/DL — SIGNIFICANT CHANGE UP (ref 11.5–15.5)
MAGNESIUM SERPL-MCNC: 2.2 MG/DL — SIGNIFICANT CHANGE UP (ref 1.6–2.6)
MCHC RBC-ENTMCNC: 28.9 PG — SIGNIFICANT CHANGE UP (ref 27–34)
MCHC RBC-ENTMCNC: 31.2 GM/DL — LOW (ref 32–36)
MCV RBC AUTO: 92.8 FL — SIGNIFICANT CHANGE UP (ref 80–100)
NRBC # BLD: 0 /100 WBCS — SIGNIFICANT CHANGE UP (ref 0–0)
PLATELET # BLD AUTO: 151 K/UL — SIGNIFICANT CHANGE UP (ref 150–400)
POTASSIUM SERPL-MCNC: 4.2 MMOL/L — SIGNIFICANT CHANGE UP (ref 3.5–5.3)
POTASSIUM SERPL-SCNC: 4.2 MMOL/L — SIGNIFICANT CHANGE UP (ref 3.5–5.3)
RBC # BLD: 4.56 M/UL — SIGNIFICANT CHANGE UP (ref 3.8–5.2)
RBC # FLD: 14.2 % — SIGNIFICANT CHANGE UP (ref 10.3–14.5)
SODIUM SERPL-SCNC: 142 MMOL/L — SIGNIFICANT CHANGE UP (ref 135–145)
WBC # BLD: 4.87 K/UL — SIGNIFICANT CHANGE UP (ref 3.8–10.5)
WBC # FLD AUTO: 4.87 K/UL — SIGNIFICANT CHANGE UP (ref 3.8–10.5)

## 2020-07-19 PROCEDURE — 71045 X-RAY EXAM CHEST 1 VIEW: CPT

## 2020-07-19 PROCEDURE — 83036 HEMOGLOBIN GLYCOSYLATED A1C: CPT

## 2020-07-19 PROCEDURE — 83735 ASSAY OF MAGNESIUM: CPT

## 2020-07-19 PROCEDURE — 85610 PROTHROMBIN TIME: CPT

## 2020-07-19 PROCEDURE — 82962 GLUCOSE BLOOD TEST: CPT

## 2020-07-19 PROCEDURE — 99238 HOSP IP/OBS DSCHRG MGMT 30/<: CPT

## 2020-07-19 PROCEDURE — 70498 CT ANGIOGRAPHY NECK: CPT

## 2020-07-19 PROCEDURE — 84100 ASSAY OF PHOSPHORUS: CPT

## 2020-07-19 PROCEDURE — 70496 CT ANGIOGRAPHY HEAD: CPT

## 2020-07-19 PROCEDURE — 87086 URINE CULTURE/COLONY COUNT: CPT

## 2020-07-19 PROCEDURE — 80053 COMPREHEN METABOLIC PANEL: CPT

## 2020-07-19 PROCEDURE — 0042T: CPT

## 2020-07-19 PROCEDURE — 80048 BASIC METABOLIC PNL TOTAL CA: CPT

## 2020-07-19 PROCEDURE — 70450 CT HEAD/BRAIN W/O DYE: CPT

## 2020-07-19 PROCEDURE — 80061 LIPID PANEL: CPT

## 2020-07-19 PROCEDURE — 97162 PT EVAL MOD COMPLEX 30 MIN: CPT

## 2020-07-19 PROCEDURE — 85025 COMPLETE CBC W/AUTO DIFF WBC: CPT

## 2020-07-19 PROCEDURE — U0003: CPT

## 2020-07-19 PROCEDURE — 84484 ASSAY OF TROPONIN QUANT: CPT

## 2020-07-19 PROCEDURE — 99291 CRITICAL CARE FIRST HOUR: CPT | Mod: 25

## 2020-07-19 PROCEDURE — 93005 ELECTROCARDIOGRAM TRACING: CPT

## 2020-07-19 PROCEDURE — 85027 COMPLETE CBC AUTOMATED: CPT

## 2020-07-19 PROCEDURE — 81001 URINALYSIS AUTO W/SCOPE: CPT

## 2020-07-19 PROCEDURE — 36415 COLL VENOUS BLD VENIPUNCTURE: CPT

## 2020-07-19 PROCEDURE — 96374 THER/PROPH/DIAG INJ IV PUSH: CPT | Mod: XU

## 2020-07-19 PROCEDURE — 85730 THROMBOPLASTIN TIME PARTIAL: CPT

## 2020-07-19 RX ORDER — RANITIDINE HYDROCHLORIDE 150 MG/1
1 TABLET, FILM COATED ORAL
Qty: 0 | Refills: 0 | DISCHARGE

## 2020-07-19 RX ORDER — LANOLIN ALCOHOL/MO/W.PET/CERES
1 CREAM (GRAM) TOPICAL
Qty: 0 | Refills: 0 | DISCHARGE

## 2020-07-19 RX ORDER — PREGABALIN 225 MG/1
1 CAPSULE ORAL
Qty: 0 | Refills: 0 | DISCHARGE

## 2020-07-19 RX ORDER — MECLIZINE HCL 12.5 MG
1 TABLET ORAL
Qty: 6 | Refills: 0
Start: 2020-07-19 | End: 2020-07-21

## 2020-07-19 RX ADMIN — Medication 81 MILLIGRAM(S): at 15:16

## 2020-07-19 RX ADMIN — AMLODIPINE BESYLATE 5 MILLIGRAM(S): 2.5 TABLET ORAL at 06:00

## 2020-07-19 RX ADMIN — DULOXETINE HYDROCHLORIDE 60 MILLIGRAM(S): 30 CAPSULE, DELAYED RELEASE ORAL at 15:16

## 2020-07-19 RX ADMIN — Medication 12.5 MILLIGRAM(S): at 06:00

## 2020-07-19 RX ADMIN — PANTOPRAZOLE SODIUM 40 MILLIGRAM(S): 20 TABLET, DELAYED RELEASE ORAL at 06:00

## 2020-07-19 NOTE — PHYSICAL THERAPY INITIAL EVALUATION ADULT - MODALITIES TREATMENT COMMENTS
CN Testing: EOMI no noted nystagmus, delayed convergence & divergence OS/OD visual fields full, face symmetrical, hearing intact, tongue midline, shoulder shrug and head turns WNL, no noted pronator drift

## 2020-07-19 NOTE — PHYSICAL THERAPY INITIAL EVALUATION ADULT - ADDITIONAL COMMENTS
Patient lives alone no steps upon entry, independent PTA, denies falls, owns a cane, no formal exercise routine PTA, patient stating she will return to her daughters home for assistance

## 2020-07-19 NOTE — PHYSICAL THERAPY INITIAL EVALUATION ADULT - GENERAL OBSERVATIONS, REHAB EVAL
Patient received semi fowlers (+) heplock, tolerating hallway mobility with CS/CGA, no focal strength, vision, sensation, coordination, balance deficits noted, patient would benefit from HPT upon discharge with family assist

## 2020-07-19 NOTE — PHYSICAL THERAPY INITIAL EVALUATION ADULT - GAIT DISTANCE, PT EVAL
decreased gait speed looking over shoulders for 90 degree head turns x 5 prior to ambulation, no LOB, patient stated she is fearful/75 feet

## 2020-07-19 NOTE — PROGRESS NOTE ADULT - ASSESSMENT
79 yo F with PMHx of HTN, CAD (1 stent), Diverticulosis, Breast ca (s/p lumpectomy, RT, now in remission), HLD, acid reflux, depression/anxiety who presents with peripheral vertigo    Peripheral Vertigo  CT head and CT angio head/neck neg, also evaluated by stroke team in ER.    Labs wnl.  VSS. No orthostatic hypotension, no sign of infection.   Would recommend meclizine only in short term- caution on anti cholinergic side effects   Awaiting PT eval,  Recommend outpatient vestibular rehab    HTN: stable on amlodipine .     DC home after PT eval 79 yo F with PMHx of HTN, CAD (1 stent), Diverticulosis, Breast ca (s/p lumpectomy, RT, now in remission), HLD, acid reflux, depression/anxiety who presents with peripheral vertigo    Peripheral Vertigo- improving   CT head and CT angio head/neck neg; symptoms evaluated by stroke team in ER and deemed to be 2/2 peripheral vertigo  Labs wnl.  VSS. No orthostatic hypotension, no sign of infection.   Would recommend meclizine only in short term- caution on anti cholinergic side effects   Awaiting PT eval,  Recommend outpatient vestibular rehab    HTN: stable on amlodipine .     Cad: stable, on aspirin and statin    DC home after PT eval

## 2020-07-19 NOTE — PROGRESS NOTE ADULT - SUBJECTIVE AND OBJECTIVE BOX
Pt seen and examined at bedside.  Patient reports that she was feeling much improved until she got up to use the restroom. Upon bending down, she felt dizzy and nausea.   Episode subsided when she got in bed and upon interview, pt reports feeling overall improved    Allergies    Cipro (Hives)  Flagyl (Hives)  lactose (Unknown)    Intolerances    NSAIDs (Unknown)    MEDICATIONS:  MEDICATIONS  (STANDING):  amLODIPine   Tablet 5 milliGRAM(s) Oral daily  aspirin enteric coated 81 milliGRAM(s) Oral daily  atorvastatin 20 milliGRAM(s) Oral at bedtime  DULoxetine 60 milliGRAM(s) Oral daily  enoxaparin Injectable 40 milliGRAM(s) SubCutaneous every 24 hours  meclizine 12.5 milliGRAM(s) Oral two times a day  pantoprazole    Tablet 40 milliGRAM(s) Oral before breakfast  polyethylene glycol 3350 17 Gram(s) Oral daily  senna 2 Tablet(s) Oral at bedtime    MEDICATIONS  (PRN):  ondansetron Injectable 4 milliGRAM(s) IV Push every 6 hours PRN Nausea and/or Vomiting    Vital Signs Last 24 Hrs  T(C): 36.8 (19 Jul 2020 05:01), Max: 36.8 (18 Jul 2020 16:19)  T(F): 98.3 (19 Jul 2020 05:01), Max: 98.3 (18 Jul 2020 16:19)  HR: 70 (19 Jul 2020 05:01) (67 - 72)  BP: 146/83 (19 Jul 2020 05:01) (109/68 - 146/83)  BP(mean): --  RR: 16 (19 Jul 2020 05:01) (16 - 18)  SpO2: 96% (19 Jul 2020 05:01) (94% - 96%)    PHYSICAL EXAM:    General: Well developed; well nourished; in no acute distress  HEENT: MMM, conjunctiva and sclera clear  Neck: Supple  CV: RRR. Normal S1/S2  Respiratory: CTAB. No respiratory distress. No intercostal retractions  Gastrointestinal: Soft non-tender non-distended. Normal bowel sounds. No hepatosplenomegaly. No rebound or guarding  Extremities: No clubbing, cyanosis, or edema  Skin: Warm and dry.   Neuro: A&O x 3. CN II-12 intact   Psych: Normal affect and mood    LABS:                        13.2   4.87  )-----------( 151      ( 19 Jul 2020 07:44 )             42.3     07-19    142  |  104  |  17  ----------------------------<  107<H>  4.2   |  27  |  0.66    Ca    9.7      19 Jul 2020 07:44  Phos  3.3     07-18  Mg     2.2     07-19                        Culture - Urine (collected 17 Jul 2020 12:23)  Source: .Urine Clean Catch (Midstream)  Final Report (18 Jul 2020 13:31):    Insignificant amount of mixed growth.      RADIOLOGY & ADDITIONAL STUDIES:

## 2020-07-19 NOTE — DISCHARGE NOTE NURSING/CASE MANAGEMENT/SOCIAL WORK - PATIENT PORTAL LINK FT
You can access the FollowMyHealth Patient Portal offered by [facility] by registering at the following website: [email or web address]. By joining [employer]’s FollowMyHealth portal, you will also be able to view your health information using other applications (apps) compatible with our system.

## 2020-07-19 NOTE — PHYSICAL THERAPY INITIAL EVALUATION ADULT - CRITERIA FOR SKILLED THERAPEUTIC INTERVENTIONS
anticipated discharge recommendation/functional limitations in following categories/impairments found/therapy frequency

## 2020-07-22 ENCOUNTER — RX RENEWAL (OUTPATIENT)
Age: 80
End: 2020-07-22

## 2020-07-23 DIAGNOSIS — I25.10 ATHEROSCLEROTIC HEART DISEASE OF NATIVE CORONARY ARTERY WITHOUT ANGINA PECTORIS: ICD-10-CM

## 2020-07-23 DIAGNOSIS — R55 SYNCOPE AND COLLAPSE: ICD-10-CM

## 2020-07-23 DIAGNOSIS — Z88.1 ALLERGY STATUS TO OTHER ANTIBIOTIC AGENTS STATUS: ICD-10-CM

## 2020-07-23 DIAGNOSIS — K21.9 GASTRO-ESOPHAGEAL REFLUX DISEASE WITHOUT ESOPHAGITIS: ICD-10-CM

## 2020-07-23 DIAGNOSIS — Z85.3 PERSONAL HISTORY OF MALIGNANT NEOPLASM OF BREAST: ICD-10-CM

## 2020-07-23 DIAGNOSIS — E78.5 HYPERLIPIDEMIA, UNSPECIFIED: ICD-10-CM

## 2020-07-23 DIAGNOSIS — H81.10 BENIGN PAROXYSMAL VERTIGO, UNSPECIFIED EAR: ICD-10-CM

## 2020-07-23 DIAGNOSIS — K57.90 DIVERTICULOSIS OF INTESTINE, PART UNSPECIFIED, WITHOUT PERFORATION OR ABSCESS WITHOUT BLEEDING: ICD-10-CM

## 2020-07-23 DIAGNOSIS — I10 ESSENTIAL (PRIMARY) HYPERTENSION: ICD-10-CM

## 2020-07-23 DIAGNOSIS — F32.9 MAJOR DEPRESSIVE DISORDER, SINGLE EPISODE, UNSPECIFIED: ICD-10-CM

## 2020-07-23 DIAGNOSIS — R00.2 PALPITATIONS: ICD-10-CM

## 2020-07-23 DIAGNOSIS — Z79.82 LONG TERM (CURRENT) USE OF ASPIRIN: ICD-10-CM

## 2020-07-24 ENCOUNTER — TRANSCRIPTION ENCOUNTER (OUTPATIENT)
Age: 80
End: 2020-07-24

## 2020-07-27 ENCOUNTER — TRANSCRIPTION ENCOUNTER (OUTPATIENT)
Age: 80
End: 2020-07-27

## 2020-07-29 ENCOUNTER — RX RENEWAL (OUTPATIENT)
Age: 80
End: 2020-07-29

## 2020-07-29 DIAGNOSIS — F41.9 ANXIETY DISORDER, UNSPECIFIED: ICD-10-CM

## 2020-08-01 DIAGNOSIS — Z87.891 PERSONAL HISTORY OF NICOTINE DEPENDENCE: ICD-10-CM

## 2020-08-01 DIAGNOSIS — Z95.5 PRESENCE OF CORONARY ANGIOPLASTY IMPLANT AND GRAFT: ICD-10-CM

## 2020-08-01 DIAGNOSIS — K59.00 CONSTIPATION, UNSPECIFIED: ICD-10-CM

## 2020-08-17 ENCOUNTER — RX RENEWAL (OUTPATIENT)
Age: 80
End: 2020-08-17

## 2020-09-14 PROBLEM — G43.909 MIGRAINE, UNSPECIFIED, NOT INTRACTABLE, WITHOUT STATUS MIGRAINOSUS: Chronic | Status: ACTIVE | Noted: 2020-07-17

## 2020-09-14 PROBLEM — K57.90 DIVERTICULOSIS OF INTESTINE, PART UNSPECIFIED, WITHOUT PERFORATION OR ABSCESS WITHOUT BLEEDING: Chronic | Status: ACTIVE | Noted: 2020-07-17

## 2020-09-18 ENCOUNTER — APPOINTMENT (OUTPATIENT)
Age: 80
End: 2020-09-18

## 2020-09-30 ENCOUNTER — APPOINTMENT (OUTPATIENT)
Age: 80
End: 2020-09-30

## 2020-09-30 ENCOUNTER — OUTPATIENT (OUTPATIENT)
Dept: OUTPATIENT SERVICES | Facility: HOSPITAL | Age: 80
LOS: 1 days | Discharge: HOME | End: 2020-09-30
Payer: MEDICARE

## 2020-09-30 ENCOUNTER — RESULT REVIEW (OUTPATIENT)
Age: 80
End: 2020-09-30

## 2020-09-30 DIAGNOSIS — Z95.5 PRESENCE OF CORONARY ANGIOPLASTY IMPLANT AND GRAFT: Chronic | ICD-10-CM

## 2020-09-30 DIAGNOSIS — Z90.12 ACQUIRED ABSENCE OF LEFT BREAST AND NIPPLE: Chronic | ICD-10-CM

## 2020-09-30 PROCEDURE — 45385 COLONOSCOPY W/LESION REMOVAL: CPT

## 2020-09-30 PROCEDURE — 88305 TISSUE EXAM BY PATHOLOGIST: CPT | Mod: 26

## 2020-10-01 PROCEDURE — 45385 COLONOSCOPY W/LESION REMOVAL: CPT

## 2020-10-01 PROCEDURE — 88305 TISSUE EXAM BY PATHOLOGIST: CPT

## 2020-10-02 LAB — SURGICAL PATHOLOGY STUDY: SIGNIFICANT CHANGE UP

## 2020-10-05 DIAGNOSIS — Z09 ENCOUNTER FOR FOLLOW-UP EXAMINATION AFTER COMPLETED TREATMENT FOR CONDITIONS OTHER THAN MALIGNANT NEOPLASM: ICD-10-CM

## 2020-10-05 DIAGNOSIS — I11.9 HYPERTENSIVE HEART DISEASE WITHOUT HEART FAILURE: ICD-10-CM

## 2020-10-05 DIAGNOSIS — K63.5 POLYP OF COLON: ICD-10-CM

## 2020-10-05 DIAGNOSIS — Z85.3 PERSONAL HISTORY OF MALIGNANT NEOPLASM OF BREAST: ICD-10-CM

## 2020-10-05 DIAGNOSIS — Z88.1 ALLERGY STATUS TO OTHER ANTIBIOTIC AGENTS STATUS: ICD-10-CM

## 2020-10-05 DIAGNOSIS — Z86.010 PERSONAL HISTORY OF COLONIC POLYPS: ICD-10-CM

## 2020-10-05 DIAGNOSIS — Z79.82 LONG TERM (CURRENT) USE OF ASPIRIN: ICD-10-CM

## 2020-10-05 DIAGNOSIS — K64.0 FIRST DEGREE HEMORRHOIDS: ICD-10-CM

## 2020-10-05 DIAGNOSIS — K57.30 DIVERTICULOSIS OF LARGE INTESTINE WITHOUT PERFORATION OR ABSCESS WITHOUT BLEEDING: ICD-10-CM

## 2020-10-05 DIAGNOSIS — E66.9 OBESITY, UNSPECIFIED: ICD-10-CM

## 2020-10-19 ENCOUNTER — RX RENEWAL (OUTPATIENT)
Age: 80
End: 2020-10-19

## 2020-10-19 ENCOUNTER — RESULT CHARGE (OUTPATIENT)
Age: 80
End: 2020-10-19

## 2020-10-20 ENCOUNTER — APPOINTMENT (OUTPATIENT)
Dept: HEART AND VASCULAR | Facility: CLINIC | Age: 80
End: 2020-10-20
Payer: MEDICARE

## 2020-10-20 ENCOUNTER — MED ADMIN CHARGE (OUTPATIENT)
Age: 80
End: 2020-10-20

## 2020-10-20 VITALS
RESPIRATION RATE: 28 BRPM | HEIGHT: 63.6 IN | SYSTOLIC BLOOD PRESSURE: 122 MMHG | TEMPERATURE: 96.5 F | OXYGEN SATURATION: 97 % | BODY MASS INDEX: 32.32 KG/M2 | HEART RATE: 78 BPM | DIASTOLIC BLOOD PRESSURE: 68 MMHG | WEIGHT: 187 LBS

## 2020-10-20 DIAGNOSIS — Z23 ENCOUNTER FOR IMMUNIZATION: ICD-10-CM

## 2020-10-20 PROCEDURE — 90662 IIV NO PRSV INCREASED AG IM: CPT

## 2020-10-20 PROCEDURE — G0008: CPT

## 2020-10-20 PROCEDURE — 36415 COLL VENOUS BLD VENIPUNCTURE: CPT

## 2020-10-20 PROCEDURE — 99213 OFFICE O/P EST LOW 20 MIN: CPT | Mod: 25

## 2020-10-20 NOTE — HISTORY OF PRESENT ILLNESS
[FreeTextEntry1] : 80 year female who notes SOB and fatigue walking 1/2 mile or less. She recalls having an extensive workup at  at the time of evaluation for a CVA

## 2020-10-20 NOTE — ASSESSMENT
[FreeTextEntry1] : KHOURY, fatigue--followup for EKG, Echo and Carotid doppler. Obtain hospital records. Labs drawn and sent. Flu Vax admin

## 2020-10-20 NOTE — PHYSICAL EXAM
[General Appearance - Well Developed] : well developed [Normal Appearance] : normal appearance [Well Groomed] : well groomed [General Appearance - Well Nourished] : well nourished [No Deformities] : no deformities [General Appearance - In No Acute Distress] : no acute distress [Normal Conjunctiva] : the conjunctiva exhibited no abnormalities [Skin Turgor] : normal skin turgor [Affect] : the affect was normal [Oriented To Time, Place, And Person] : oriented to person, place, and time [No Anxiety] : not feeling anxious [Mood] : the mood was normal

## 2020-10-21 LAB
25(OH)D3 SERPL-MCNC: 24.2 NG/ML
ALBUMIN SERPL ELPH-MCNC: 4.1 G/DL
ALP BLD-CCNC: 85 U/L
ALT SERPL-CCNC: 14 U/L
ANION GAP SERPL CALC-SCNC: 12 MMOL/L
AST SERPL-CCNC: 15 U/L
BASOPHILS # BLD AUTO: 0.01 K/UL
BASOPHILS NFR BLD AUTO: 0.2 %
BILIRUB SERPL-MCNC: 0.2 MG/DL
BUN SERPL-MCNC: 14 MG/DL
CALCIUM SERPL-MCNC: 10 MG/DL
CHLORIDE SERPL-SCNC: 105 MMOL/L
CHOLEST SERPL-MCNC: 142 MG/DL
CO2 SERPL-SCNC: 29 MMOL/L
CREAT SERPL-MCNC: 0.68 MG/DL
EOSINOPHIL # BLD AUTO: 0.16 K/UL
EOSINOPHIL NFR BLD AUTO: 2.9 %
ESTIMATED AVERAGE GLUCOSE: 114 MG/DL
GLUCOSE SERPL-MCNC: 126 MG/DL
HBA1C MFR BLD HPLC: 5.6 %
HCT VFR BLD CALC: 45.1 %
HDLC SERPL-MCNC: 45 MG/DL
HGB BLD-MCNC: 13.2 G/DL
IMM GRANULOCYTES NFR BLD AUTO: 0.2 %
LDLC SERPL CALC-MCNC: 66 MG/DL
LYMPHOCYTES # BLD AUTO: 0.86 K/UL
LYMPHOCYTES NFR BLD AUTO: 15.5 %
MAN DIFF?: NORMAL
MCHC RBC-ENTMCNC: 28.8 PG
MCHC RBC-ENTMCNC: 29.3 GM/DL
MCV RBC AUTO: 98.3 FL
MONOCYTES # BLD AUTO: 0.37 K/UL
MONOCYTES NFR BLD AUTO: 6.7 %
NEUTROPHILS # BLD AUTO: 4.13 K/UL
NEUTROPHILS NFR BLD AUTO: 74.5 %
NONHDLC SERPL-MCNC: 97 MG/DL
NT-PROBNP SERPL-MCNC: 144 PG/ML
PLATELET # BLD AUTO: 193 K/UL
POTASSIUM SERPL-SCNC: 4.8 MMOL/L
PROT SERPL-MCNC: 6.4 G/DL
RBC # BLD: 4.59 M/UL
RBC # FLD: 14.4 %
SARS-COV-2 IGG SERPL IA-ACNC: <0.1 INDEX
SARS-COV-2 IGG SERPL QL IA: NEGATIVE
SODIUM SERPL-SCNC: 146 MMOL/L
TRIGL SERPL-MCNC: 152 MG/DL
TSH SERPL-ACNC: 1.18 UIU/ML
WBC # FLD AUTO: 5.54 K/UL

## 2020-10-28 ENCOUNTER — RX RENEWAL (OUTPATIENT)
Age: 80
End: 2020-10-28

## 2020-11-11 ENCOUNTER — APPOINTMENT (OUTPATIENT)
Dept: HEART AND VASCULAR | Facility: CLINIC | Age: 80
End: 2020-11-11
Payer: MEDICARE

## 2020-11-11 VITALS
DIASTOLIC BLOOD PRESSURE: 80 MMHG | HEART RATE: 74 BPM | HEIGHT: 63 IN | SYSTOLIC BLOOD PRESSURE: 122 MMHG | OXYGEN SATURATION: 97 % | RESPIRATION RATE: 16 BRPM | BODY MASS INDEX: 33.14 KG/M2 | WEIGHT: 187.04 LBS | TEMPERATURE: 96.8 F

## 2020-11-11 DIAGNOSIS — I35.1 NONRHEUMATIC AORTIC (VALVE) INSUFFICIENCY: ICD-10-CM

## 2020-11-11 DIAGNOSIS — I65.29 OCCLUSION AND STENOSIS OF UNSPECIFIED CAROTID ARTERY: ICD-10-CM

## 2020-11-11 PROCEDURE — 99214 OFFICE O/P EST MOD 30 MIN: CPT

## 2020-11-11 PROCEDURE — 93306 TTE W/DOPPLER COMPLETE: CPT

## 2020-11-11 PROCEDURE — 93000 ELECTROCARDIOGRAM COMPLETE: CPT

## 2020-11-11 PROCEDURE — 93880 EXTRACRANIAL BILAT STUDY: CPT

## 2020-11-11 NOTE — HISTORY OF PRESENT ILLNESS
[FreeTextEntry1] : 80 year female who notes eating a lot of carbs due to chronic abdominal discomfort followed by her GI specialist

## 2020-11-11 NOTE — ASSESSMENT
[FreeTextEntry1] : At the time of the patient's visit a Carotid Doppler was performed to evaluate for PVD.\par \par At the time of the patient's visit an Echocardiogram was performed to evaluate LV function. At the time of the visit the results were reviewed with patient \par \par We discussed pairing carbs with protein. She already had Flu Vax

## 2020-11-24 ENCOUNTER — RX RENEWAL (OUTPATIENT)
Age: 80
End: 2020-11-24

## 2021-01-12 ENCOUNTER — RESULT REVIEW (OUTPATIENT)
Age: 81
End: 2021-01-12

## 2021-01-12 ENCOUNTER — APPOINTMENT (OUTPATIENT)
Dept: ORTHOPEDIC SURGERY | Facility: CLINIC | Age: 81
End: 2021-01-12

## 2021-01-12 ENCOUNTER — OUTPATIENT (OUTPATIENT)
Dept: OUTPATIENT SERVICES | Facility: HOSPITAL | Age: 81
LOS: 1 days | End: 2021-01-12
Payer: MEDICARE

## 2021-01-12 ENCOUNTER — APPOINTMENT (OUTPATIENT)
Dept: ORTHOPEDIC SURGERY | Facility: CLINIC | Age: 81
End: 2021-01-12
Payer: MEDICARE

## 2021-01-12 VITALS
TEMPERATURE: 97.8 F | SYSTOLIC BLOOD PRESSURE: 120 MMHG | DIASTOLIC BLOOD PRESSURE: 80 MMHG | BODY MASS INDEX: 33.13 KG/M2 | WEIGHT: 187 LBS | OXYGEN SATURATION: 93 % | HEIGHT: 63 IN | HEART RATE: 86 BPM

## 2021-01-12 DIAGNOSIS — M16.0 BILATERAL PRIMARY OSTEOARTHRITIS OF HIP: ICD-10-CM

## 2021-01-12 DIAGNOSIS — Z90.12 ACQUIRED ABSENCE OF LEFT BREAST AND NIPPLE: Chronic | ICD-10-CM

## 2021-01-12 DIAGNOSIS — G89.29 LUMBAGO WITH SCIATICA, RIGHT SIDE: ICD-10-CM

## 2021-01-12 DIAGNOSIS — M43.10 SPONDYLOLISTHESIS, SITE UNSPECIFIED: ICD-10-CM

## 2021-01-12 DIAGNOSIS — Z95.5 PRESENCE OF CORONARY ANGIOPLASTY IMPLANT AND GRAFT: Chronic | ICD-10-CM

## 2021-01-12 DIAGNOSIS — M54.41 LUMBAGO WITH SCIATICA, RIGHT SIDE: ICD-10-CM

## 2021-01-12 DIAGNOSIS — M47.816 SPONDYLOSIS W/OUT MYELOPATHY OR RADICULOPATHY, LUMBAR REGION: ICD-10-CM

## 2021-01-12 PROCEDURE — 99203 OFFICE O/P NEW LOW 30 MIN: CPT

## 2021-01-12 PROCEDURE — 72110 X-RAY EXAM L-2 SPINE 4/>VWS: CPT

## 2021-01-12 PROCEDURE — 72110 X-RAY EXAM L-2 SPINE 4/>VWS: CPT | Mod: 26

## 2021-01-12 PROCEDURE — 73521 X-RAY EXAM HIPS BI 2 VIEWS: CPT

## 2021-01-12 PROCEDURE — 73521 X-RAY EXAM HIPS BI 2 VIEWS: CPT | Mod: 26

## 2021-01-12 RX ORDER — HYDROCORTISONE 25 MG/G
2.5 CREAM TOPICAL
Qty: 30 | Refills: 0 | Status: ACTIVE | COMMUNITY
Start: 2020-11-20

## 2021-01-12 RX ORDER — MOMETASONE FUROATE 1 MG/ML
0.1 SOLUTION TOPICAL
Qty: 60 | Refills: 0 | Status: ACTIVE | COMMUNITY
Start: 2020-11-20

## 2021-01-12 RX ORDER — KETOCONAZOLE 20 MG/G
2 CREAM TOPICAL
Qty: 30 | Refills: 0 | Status: ACTIVE | COMMUNITY
Start: 2020-08-31

## 2021-01-12 NOTE — REVIEW OF SYSTEMS
[Negative] : Neurological [Joint Pain] : joint pain [Joint Stiffness] : joint stiffness [Heartburn] : heartburn [Urinary Frequency] : urinary frequency [Anxiety] : anxiety [Feeling Weak] : feeling weak [Muscle Weakness] : muscle weakness [Easy Bruising] : a tendency for easy bruising

## 2021-01-12 NOTE — HISTORY OF PRESENT ILLNESS
[de-identified] : The patient is a 80 year old woman presenting with low back and right hip pain.\par \par The patient presents with a several month history of chronic, atraumatic, right-sided low back pain with radiation down posterolateral hip, posterior thigh and leg.  She endorses a history of "disc disease", and has had epidural injections in the past.  Her pain has been progressive over the last few months.  She endorses some neurogenic claudication, with predictable pain after short walks.  The patient denies red flag symptoms including fever, chills, weight loss, night sweats, bowel/bladder dysfunction, saddle anesthesia.\par She is on gabapentin for migraines.  She has tried increased doses in the past, but did not tolerate because of drowsiness.\par \par Pain is moderate in intensity, described as achy, improved with rest, worse with walking.

## 2021-01-12 NOTE — PHYSICAL EXAM
[de-identified] : General: Well-nourished, well-developed, alert, and in no acute distress.\par Head: Normocephalic.\par Eyes: Pupils equal round reactive to light and accommodation, extraocular muscles intact, normal sclera.\par Nose: No nasal discharge.\par Cardiac: Regular rate. Extremities are warm and well perfused. Distal pulses are symmetric bilaterally.\par Respiratory: No labored breathing.\par Extremities: Sensation is intact distally bilaterally.  Distal pulses are symmetric bilaterally\par Lymphatic: No regional lymphadenopathy, no lymphedema\par Neurologic: No focal deficits\par Skin: Normal skin color, texture, and turgor\par Psychiatric: Normal affect\par MSK: as noted above/below\par \par \par \par Low Back:\par \par Inspection:\par ·	Alignment: LEFT-SIDED LIST\par ·	No scars\par \par Palpation:   \par ·	SI  Joints:  RIGHT-SIDED pain\par ·	Iliolumbar ligaments    RIGHT-SIDED pain\par ·	Quadratus lumborum   RIGHT-SIDED pain\par ·	Interspinous ligament  NO pain\par ·	Greater trochanter  NO pain\par ·	Mid thoracic spine: NO  pain\par \par \par ROM: \par ·	 Flexion:  30 degrees, without pain.\par ·	Extension:  5-10 degrees without pain  \par ·	Side Bending:  Full, wWITH SOME DISCOMFORT ON RIGHT\par PAIN WITH FACET LOADING ON RIGHT\par \par \par Strength: \par ·	Core:  Trendelenburg: FAIR-GOOD\par ·	Hip / Leg Flexion, Extension  4+/5 HIP FLEXION ON RIGHT, 5/5 ON LEFT\par ·	Foot Eversion/ Inversion:   5/5\par ·	Calf:   5/5\par ·	EHL:  5/5\par \par NEURO  \par ·	Sensation:   Intact throughout the lower extremity\par ·	DTR's:  Symmetric throughout the lower extremity.  \par ·	Upper Motor Neuron:\par                 Funk Test: NEGATIVE\par                 Romberg Test: NEGATIVE\par                 Clonus: NEGATIVE\par \par Other tests: \par ·	Slump test: EQUIVOCAL ON RIGHT\par ·	Straight leg raise test:   NEGATIVE\par Vascular:  \par ·	No cyanosis, clubbing, edema.  \par ·	Palpable dorsalis pedis.\par \par \par LEFT HIP:\par \par Inspection: no bruising, erythema, rash, deformity \par Palpation: No Greater Trochanter pain , ITB pain , Hip Flexor pain  , Piriformis pain , Proximal Hamstring Pain , Groin pain \par ROM: MILDLY DECREASE Internal Rotation , External Rotation\par Strength: 5/5 Hip Flexion, Hip Extension, Hip Abduction, Hip Adduction\par \par Distal Pulses: normal\par Lower Extremity Sensation: normal \par Patellar/Achilles Reflex: normal\par \par Special Tests:\par Stinchfield: NEGATIVE \par Log Roll: NEGATIVE\par FABERE: NEGATIVE\par FADIR: NEGATIVE\par \par RIGHT HIP:\par \par Inspection: no bruising, erythema, rash, deformity \par Palpation: No Greater Trochanter pain , ITB pain , Hip Flexor pain  , , Proximal Hamstring Pain , Groin pain \par ROM: MILDLY DECREASED Internal Rotation , External Rotation\par Strength: 4+/5 Hip Flexion, 5/5 Hip Extension, Hip Abduction, Hip Adduction\par \par Distal Pulses: normal\par Lower Extremity Sensation: normal \par Patellar/Achilles Reflex: normal\par \par Special Tests:\par Stinchfield: POSITIVE\par Log Roll: NEGATIVE\par FABERE: NEGATIVE\par FADIR: MILDLY POSITIVE\par \par \par  [de-identified] : Xray Lumbosacral Spine - Multiple views were reviewed with the patient in detail.  \par \par There is scoliosis of the lumbar spine with the apex to the left centered at the T12/L1 level. In addition, there is approximately 10 mm of Grade 1 anterolisthesis of L4 on L5 which reduces with flexion. There is no motion with extension. There is of intervertebral disc space height throughout the lumbar spine which is most pronounced at the L4/5 level. There are posterior internal osteophytes at the T12/L1 through the L2/3 levels. The vertebral body heights are maintained. There are marked degenerative changes involving the facets at the L3/4 through the L5/S1 levels. There is diffuse osteopenia. The osseous structures are intact without fracture.\par \par Xray Pelvis/Hip - Multiple views were reviewed with the patient in detail. \par \par Frontal view of the pelvis and frontal and abduction views of both hips demonstrates degenerative change of the lower lumbar spine. There is mild narrowing of the bilateral hip joints, greater on the left than on the right. There is osteopenia. There is no fracture. There is no dislocation.\par \par

## 2021-01-12 NOTE — DISCUSSION/SUMMARY
[Medication Risks Reviewed] : Medication risks reviewed [de-identified] : The patient is a 80 year old woman presenting with chronic, progressive right-sided low back pain with radiation down posterolateral hip and lower extremity.  She has evidence of degenerative spondylolisthesis with some dynamic instability.  Cannot rule out radiculopathy.\par \par She also has mild bilateral hip osteoarthritis.\par \par Imaging/Diagnostics/Medical Records were interpreted and/or reviewed and results were reviewed with the patient in detail.  All questions were answered appropriately.\par \par \par I discussed the treatment of low back pain with the patient at length today. I described the spectrum of treatment from nonoperative modalities to surgery. Noninvasive and nonoperative treatment modalities include relative rest, weight reduction, activity modification, PRN use of acetaminophen/ anti-inflammatory medication if tolerated, corticosteroids if indicated, home exercise program and physical therapy.  We also discussed adjunctive treatment including heat therapy, accupuncture, manual therapy, TENS unit.  Further treatments can include interventional spine procedures such as epidural injections, and surgical consultation.\par \par MRI lumbar spine ordered today.\par \par Hold on NSAIDs given history of GI dysfunction.\par \par Hold on PO steroids for now.\par \par Patient was prescribed a short course of Lidoderm patches.Appropriate use of medication was reviewed with the patient in detail. Risks, benefits, and adverse effects medication were discussed.\par \par The patient was also provided some general home exercises.  The patient was counseled on activity modification.\par \par Follow-up after MRI or in 2 weeks.\par \par ------------------------------------------------------------------------------------------------------------------\par Patient appreciates and agrees with current plan.\par \par The patient's diagnosis above was evaluated by me, personally.  Diagnostic Testing and treatment options were discussed with the patient in detail.  The risks/benefits/potential complications of diagnostic testing/treatments were described in detail.  \par \par This note was generated using a mixture of manual typing and dragon medical dictation software.  A reasonable effort has been made for proofreading its contents, but typos may still remain.  If there are any questions or points of clarification needed please notify my office.\par \par \par >30 minutes of time was spent on total encounter.  >50% of the visit was spent on face-to-face counseling/coordination of care and medical-decision making for this patient.\par \par

## 2021-01-15 ENCOUNTER — NON-APPOINTMENT (OUTPATIENT)
Age: 81
End: 2021-01-15

## 2021-01-19 ENCOUNTER — RX RENEWAL (OUTPATIENT)
Age: 81
End: 2021-01-19

## 2021-01-25 ENCOUNTER — RX RENEWAL (OUTPATIENT)
Age: 81
End: 2021-01-25

## 2021-02-17 ENCOUNTER — RX RENEWAL (OUTPATIENT)
Age: 81
End: 2021-02-17

## 2021-04-20 ENCOUNTER — RX RENEWAL (OUTPATIENT)
Age: 81
End: 2021-04-20

## 2021-04-22 ENCOUNTER — RX RENEWAL (OUTPATIENT)
Age: 81
End: 2021-04-22

## 2021-04-26 ENCOUNTER — RX RENEWAL (OUTPATIENT)
Age: 81
End: 2021-04-26

## 2021-05-18 ENCOUNTER — RX RENEWAL (OUTPATIENT)
Age: 81
End: 2021-05-18

## 2021-05-25 ENCOUNTER — RX RENEWAL (OUTPATIENT)
Age: 81
End: 2021-05-25

## 2021-06-03 ENCOUNTER — RX RENEWAL (OUTPATIENT)
Age: 81
End: 2021-06-03

## 2021-06-10 ENCOUNTER — RX RENEWAL (OUTPATIENT)
Age: 81
End: 2021-06-10

## 2021-07-22 ENCOUNTER — RX RENEWAL (OUTPATIENT)
Age: 81
End: 2021-07-22

## 2021-08-16 ENCOUNTER — RX RENEWAL (OUTPATIENT)
Age: 81
End: 2021-08-16

## 2021-08-22 ENCOUNTER — RX RENEWAL (OUTPATIENT)
Age: 81
End: 2021-08-22

## 2021-08-25 ENCOUNTER — APPOINTMENT (OUTPATIENT)
Dept: HEART AND VASCULAR | Facility: CLINIC | Age: 81
End: 2021-08-25

## 2021-08-25 NOTE — HISTORY OF PRESENT ILLNESS
[FreeTextEntry1] : 81 year female who comes prior to right eye cataract surgery with Dr Hollins [phone] on September 27, 2021 and needs to return for within 30 day

## 2021-09-09 ENCOUNTER — APPOINTMENT (OUTPATIENT)
Dept: HEART AND VASCULAR | Facility: CLINIC | Age: 81
End: 2021-09-09
Payer: MEDICARE

## 2021-09-09 VITALS
SYSTOLIC BLOOD PRESSURE: 114 MMHG | BODY MASS INDEX: 33.49 KG/M2 | RESPIRATION RATE: 16 BRPM | HEIGHT: 63 IN | HEART RATE: 70 BPM | WEIGHT: 189.03 LBS | TEMPERATURE: 97.8 F | DIASTOLIC BLOOD PRESSURE: 72 MMHG | OXYGEN SATURATION: 96 %

## 2021-09-09 PROCEDURE — 99215 OFFICE O/P EST HI 40 MIN: CPT

## 2021-09-09 PROCEDURE — 93000 ELECTROCARDIOGRAM COMPLETE: CPT

## 2021-09-09 PROCEDURE — 36415 COLL VENOUS BLD VENIPUNCTURE: CPT

## 2021-09-09 RX ORDER — CEPHALEXIN 500 MG/1
500 CAPSULE ORAL
Qty: 10 | Refills: 0 | Status: DISCONTINUED | COMMUNITY
Start: 2020-10-02 | End: 2021-09-09

## 2021-09-09 NOTE — HISTORY OF PRESENT ILLNESS
[FreeTextEntry1] : 81 year female who notes lower back pain that makes her stop when walking to relieve some pressure there. She comes prior to  right eye cataract surgery with Dr Hollins [phone] on September 27, 2021. No chest pain, SOB, KHOURY, palpitations, orthopnea, PND or syncope. She notes vertigo that returns when sitting up too quickly. She had a Neuro-Optho evaluation and was told that everything was OK. She believes she has a post-nasal drip that makes her wheeze or cough intermittently. She has stable stomach issues relating to diverticular disease. She uses a cane for balance when she feels that her feet are not that strong. She notes feeling a bit more depressed and notes that it can impact her memory which then comes back

## 2021-09-09 NOTE — REVIEW OF SYSTEMS
[Blurry Vision] : blurred vision [Vertigo] : vertigo [Lower Ext Edema] : lower extremity edema [Cough] : cough [Wheezing] : wheezing [Tremor] : a tremor was seen [Limb Weakness (Paresis)] : limb weakness (Paresis) [Memory Lapses Or Loss] : memory lapses or loss [Depression] : depression [Easy Bruising] : a tendency for easy bruising [Negative] : Integumentary [Seeing Double (Diplopia)] : no diplopia [Eye Pain] : no eye pain [Earache] : no earache [Discharge From Ears] : no discharge from the ears [Hearing Loss] : no hearing loss [Mouth Sores] : no mouth sores [Sore Throat] : no sore throat [Sinus Pressure] : no sinus pressure [Tinnitus] : no tinnitus [SOB] : no shortness of breath [Dyspnea on exertion] : not dyspnea during exertion [Chest Discomfort] : no chest discomfort [Leg Claudication] : no intermittent leg claudication [Palpitations] : no palpitations [Orthopnea] : no orthopnea [PND] : no PND [Syncope] : no syncope [Coughing Up Blood] : no hemoptysis [Snoring] : no snoring [Dizziness] : no dizziness [Numbness (Hypoesthesia)] : no numbness [Convulsions] : no convulsions [Tingling (Paresthesia)] : no tingling [Weakness] : no weakness [Speech Disturbance] : no speech disturbance [Confusion] : no confusion was observed [Anxiety] : no anxiety [Under Stress] : not under stress [Suicidal] : not suicidal [Easy Bleeding] : no tendency for easy bleeding [Swollen Glands] : no swollen glands [FreeTextEntry7] : see HPI [FreeTextEntry9] : see HPI

## 2021-09-09 NOTE — ASSESSMENT
[FreeTextEntry1] : I informed the patient that I did not find a Medical or Cardiovascular contraindication to the proposed surgery at this time

## 2021-09-09 NOTE — PHYSICAL EXAM
[Well Developed] : well developed [Normal Conjunctiva] : normal conjunctiva [Normal S1, S2] : normal S1, S2 [Clear Lung Fields] : clear lung fields [Normal Gait] : normal gait [No Rash] : no rash [Moves all extremities] : moves all extremities [Normal] : alert and oriented, normal memory [de-identified] : mild baseline ankle edema

## 2021-09-21 ENCOUNTER — NON-APPOINTMENT (OUTPATIENT)
Age: 81
End: 2021-09-21

## 2021-09-27 NOTE — PATIENT PROFILE ADULT - NSPROPOAURINARYCATHETER_GEN_A_NUR
WOUND CARE AFTER SKIN BIOPSY    1. Keep area dry for 24 hours.    2. Wash your hands thoroughly with soap and water for at least 20 seconds before any wound care.    3. After the first 24 hours, gently cleanse the biopsy site 1-2 times each day with soap and water, pat dry and apply ointment and a new bandage to the area until it is healed.  Keeping the area covered will allow it to heal more quickly than if it is left open to the air.    ~ May take 2-4 weeks for biopsies on the trunk and extremities to heal; may take 1-2 weeks for head and neck sites.    4. If stitches have been placed in the site, continue the daily wound care outlined above until the stitches are removed in our office.    5. If bleeding at site occurs, hold firm pressure with a clean towel for 15 minutes.  Do not look at area or release pressure for that 15 minutes.  Call our office if the area continues to bleed after holding pressure for 15 minutes.    6. Call our office immediately if any signs of infection occur at the biopsy site (pain, drainage, pus, or red streaks from the site) or if bleeding or excessive swelling occurs.  A thin pink rim surrounding the site is normal.    7. You will be notified of results by letter or phone call (or at recheck appointment).  If a month or more has passed since your procedure and you have not received results, please call our office at [phone].         no

## 2021-10-18 ENCOUNTER — RX RENEWAL (OUTPATIENT)
Age: 81
End: 2021-10-18

## 2021-11-11 ENCOUNTER — RX RENEWAL (OUTPATIENT)
Age: 81
End: 2021-11-11

## 2021-11-18 ENCOUNTER — RX RENEWAL (OUTPATIENT)
Age: 81
End: 2021-11-18

## 2021-11-21 ENCOUNTER — TRANSCRIPTION ENCOUNTER (OUTPATIENT)
Age: 81
End: 2021-11-21

## 2021-12-01 ENCOUNTER — NON-APPOINTMENT (OUTPATIENT)
Age: 81
End: 2021-12-01

## 2021-12-22 ENCOUNTER — APPOINTMENT (OUTPATIENT)
Dept: HEART AND VASCULAR | Facility: CLINIC | Age: 81
End: 2021-12-22
Payer: MEDICARE

## 2021-12-22 VITALS
WEIGHT: 187 LBS | OXYGEN SATURATION: 96 % | SYSTOLIC BLOOD PRESSURE: 128 MMHG | BODY MASS INDEX: 33.13 KG/M2 | RESPIRATION RATE: 16 BRPM | HEIGHT: 63 IN | DIASTOLIC BLOOD PRESSURE: 72 MMHG | TEMPERATURE: 97.8 F | HEART RATE: 67 BPM

## 2021-12-22 DIAGNOSIS — I10 ESSENTIAL (PRIMARY) HYPERTENSION: ICD-10-CM

## 2021-12-22 PROCEDURE — 93000 ELECTROCARDIOGRAM COMPLETE: CPT

## 2021-12-22 PROCEDURE — 99214 OFFICE O/P EST MOD 30 MIN: CPT

## 2021-12-22 PROCEDURE — 36415 COLL VENOUS BLD VENIPUNCTURE: CPT

## 2021-12-22 RX ORDER — GABAPENTIN 300 MG/1
300 CAPSULE ORAL TWICE DAILY
Qty: 60 | Refills: 1 | Status: DISCONTINUED | COMMUNITY
Start: 2019-09-12 | End: 2021-12-22

## 2021-12-22 NOTE — ASSESSMENT
[FreeTextEntry1] : An EKG was performed to evaluate for arrhythmia and ischemia. \par \par CAD-- I asked her to return for an Echocardiogram and Pharm Nuclear Stress Test. Labs were drawn in the office and sent

## 2021-12-22 NOTE — HISTORY OF PRESENT ILLNESS
[FreeTextEntry1] : 81 year female who comes for followup. She feels worn down by pandemic. She is seeing Derm for a lesion on her left leg. She is on a topical cream. She denies having chest pain, SOB, KHOURY, dizziness, palpitations, orthopnea, PND or syncope. She is limited by back pain. She notes having stomach issues and occasional sensations in her chest

## 2021-12-22 NOTE — PHYSICAL EXAM
[Normal Conjunctiva] : normal conjunctiva [Normal Gait] : normal gait [No Edema] : no edema [Moves all extremities] : moves all extremities [Normal] : alert and oriented, normal memory

## 2021-12-23 LAB
ALBUMIN SERPL ELPH-MCNC: 4.6 G/DL
ALP BLD-CCNC: 82 U/L
ALT SERPL-CCNC: 11 U/L
ANION GAP SERPL CALC-SCNC: 8 MMOL/L
AST SERPL-CCNC: 12 U/L
BASOPHILS # BLD AUTO: 0.02 K/UL
BASOPHILS NFR BLD AUTO: 0.4 %
BILIRUB SERPL-MCNC: 0.4 MG/DL
BUN SERPL-MCNC: 19 MG/DL
CALCIUM SERPL-MCNC: 10.5 MG/DL
CHLORIDE SERPL-SCNC: 103 MMOL/L
CHOLEST SERPL-MCNC: 151 MG/DL
CO2 SERPL-SCNC: 31 MMOL/L
CREAT SERPL-MCNC: 0.83 MG/DL
EOSINOPHIL # BLD AUTO: 0.14 K/UL
EOSINOPHIL NFR BLD AUTO: 2.8 %
ESTIMATED AVERAGE GLUCOSE: 117 MG/DL
GLUCOSE SERPL-MCNC: 82 MG/DL
HBA1C MFR BLD HPLC: 5.7 %
HCT VFR BLD CALC: 45.1 %
HDLC SERPL-MCNC: 46 MG/DL
HGB BLD-MCNC: 14.3 G/DL
IMM GRANULOCYTES NFR BLD AUTO: 0.2 %
LDLC SERPL CALC-MCNC: 84 MG/DL
LYMPHOCYTES # BLD AUTO: 0.99 K/UL
LYMPHOCYTES NFR BLD AUTO: 19.9 %
MAN DIFF?: NORMAL
MCHC RBC-ENTMCNC: 30.3 PG
MCHC RBC-ENTMCNC: 31.7 GM/DL
MCV RBC AUTO: 95.6 FL
MONOCYTES # BLD AUTO: 0.45 K/UL
MONOCYTES NFR BLD AUTO: 9.1 %
NEUTROPHILS # BLD AUTO: 3.36 K/UL
NEUTROPHILS NFR BLD AUTO: 67.6 %
NONHDLC SERPL-MCNC: 106 MG/DL
PLATELET # BLD AUTO: 185 K/UL
POTASSIUM SERPL-SCNC: 5.5 MMOL/L
PROT SERPL-MCNC: 6.8 G/DL
RBC # BLD: 4.72 M/UL
RBC # FLD: 13.8 %
SODIUM SERPL-SCNC: 142 MMOL/L
TRIGL SERPL-MCNC: 105 MG/DL
TSH SERPL-ACNC: 1.85 UIU/ML
WBC # FLD AUTO: 4.97 K/UL

## 2022-01-13 ENCOUNTER — RX RENEWAL (OUTPATIENT)
Age: 82
End: 2022-01-13

## 2022-02-14 ENCOUNTER — APPOINTMENT (OUTPATIENT)
Dept: GASTROENTEROLOGY | Facility: CLINIC | Age: 82
End: 2022-02-14
Payer: MEDICARE

## 2022-02-14 VITALS
TEMPERATURE: 95.6 F | RESPIRATION RATE: 14 BRPM | HEART RATE: 65 BPM | DIASTOLIC BLOOD PRESSURE: 80 MMHG | WEIGHT: 185 LBS | HEIGHT: 63 IN | BODY MASS INDEX: 32.78 KG/M2 | SYSTOLIC BLOOD PRESSURE: 130 MMHG | OXYGEN SATURATION: 98 %

## 2022-02-14 PROCEDURE — 99214 OFFICE O/P EST MOD 30 MIN: CPT

## 2022-02-14 NOTE — ASSESSMENT
[FreeTextEntry1] : 81F with PMHx significant for IBS, GERD, and Diverticulosis - with recurrent diverticulitis, who presents for follow up of constipation/diverticulosis.\par \par Chronic constipation- \par - encouraged to stop MiraLAX since not helping symptoms\par - begin Senna 2 tabs and Colace 2 capsules at bedtime \par - increase fluid intake and encourage activity\par - encourage fiber sources in food, discourage fiber supplements \par - can take IBgard or hyoscyamine as needed for discomfort\par - consider imaging if pain does not resovle\par \par Diverticulosis -\par - conservative mgmt\par \par Esophageal reflux, stable.\par - continue PEPCID 20mg PRN \par \par HCM\par -aged out for colonoscopy \par \par f/u PRN \par \par

## 2022-02-14 NOTE — PHYSICAL EXAM
[General Appearance - Alert] : alert [General Appearance - In No Acute Distress] : in no acute distress [Sclera] : the sclera and conjunctiva were normal [Outer Ear] : the ears and nose were normal in appearance [Oropharynx] : the oropharynx was normal [Neck Appearance] : the appearance of the neck was normal [] : no respiratory distress [Auscultation Breath Sounds / Voice Sounds] : lungs were clear to auscultation bilaterally [Heart Rate And Rhythm] : heart rate was normal and rhythm regular [Heart Sounds] : normal S1 and S2 [Edema] : there was no peripheral edema [Bowel Sounds] : normal bowel sounds [Abdomen Soft] : soft [Abdomen Tenderness] : non-tender [Abnormal Walk] : normal gait [Skin Color & Pigmentation] : normal skin color and pigmentation [Skin Turgor] : normal skin turgor [Oriented To Time, Place, And Person] : oriented to person, place, and time [Affect] : the affect was normal

## 2022-02-14 NOTE — HISTORY OF PRESENT ILLNESS
[___ Month(s) Ago] : [unfilled] month(s) ago [Heartburn] : improved heartburn [Nausea] : stable nausea [Vomiting] : improved vomiting [Diarrhea] : improved diarrhea [Abdominal Pain] : improved abdominal pain [Yellow Skin Or Eyes (Jaundice)] : denies jaundice [Abdominal Swelling] : denies abdominal swelling [Rectal Pain] : denies rectal pain [Wt Loss ___ Lbs] : recent [unfilled] ~Upound(s) weight loss [Constipation] : constipation [GERD] : gastroesophageal reflux disease [_________] : Performed [unfilled] [Wt Gain ___ Lbs] : no recent weight gain [de-identified] : 81F with PMHx significant for IBS, GERD, and Diverticulosis - with recurrent diverticulitis, who presents for follow up of constipation/diverticulosis.\par \par 2/14/22\par - still having abdominal discomfort which is about the same as prior episodes \par - feels much better with regular bowel movements\par - takes MIRALAX but unsure if really helping improve symptoms \par - no blood in stool \par - does not want another colonoscopy \par - has some nausea when backed up, reports BMs not complete and does have signficant straining \par \par 4/1/19\par -Pt states she is feeling about the same since last visit. Constipation is still a big problem, BM once every 2 days and feels incomplete. She will get bad cramps from her diverticulosis that cause nausea at times and leads to regurgitation. Episodes of nausea last only a few minutes. IBgard has been helping her stomach settle and with gas. She is no longer on a gluten free diet because she does not believe it helped her. Zantac is helping with heartburn. Denies any other GI symptoms. \par \par Previous history:\par 78 yr old F with PMHx significant for IBS, GERD, and Diverticulosis - with recurrent diverticulitis, who presents for evaluation of constipation/differentiation of abd pain.\par Has been having increased constipation - and reportedly goes every 2-3days, and this is associated with some R sided abdominal pain, relieved after BM. Saw Dr Alvares, non operative mgmt, benefiber suggested. IBGARD helps. Her bowel movements are the most part regular, there is no rectal bleeding, nausea or vomiting.\par \par She's come to realize it to have good days and bad days and overall seems in good spirits.\par \par She multiple polyps on her last colonoscopy will require followup and 2020 [de-identified] : multiple small polyps

## 2022-02-16 ENCOUNTER — APPOINTMENT (OUTPATIENT)
Dept: HEART AND VASCULAR | Facility: CLINIC | Age: 82
End: 2022-02-16
Payer: MEDICARE

## 2022-02-16 VITALS
WEIGHT: 185 LBS | OXYGEN SATURATION: 96 % | HEIGHT: 63 IN | HEART RATE: 67 BPM | DIASTOLIC BLOOD PRESSURE: 70 MMHG | TEMPERATURE: 97.9 F | BODY MASS INDEX: 32.78 KG/M2 | SYSTOLIC BLOOD PRESSURE: 128 MMHG

## 2022-02-16 DIAGNOSIS — R06.00 DYSPNEA, UNSPECIFIED: ICD-10-CM

## 2022-02-16 PROCEDURE — 78452 HT MUSCLE IMAGE SPECT MULT: CPT

## 2022-02-16 PROCEDURE — 99213 OFFICE O/P EST LOW 20 MIN: CPT | Mod: 25

## 2022-02-16 PROCEDURE — 93306 TTE W/DOPPLER COMPLETE: CPT

## 2022-02-16 PROCEDURE — A9500: CPT

## 2022-02-16 PROCEDURE — 93015 CV STRESS TEST SUPVJ I&R: CPT

## 2022-02-16 NOTE — ASSESSMENT
[FreeTextEntry1] : At the time of the patient's visit an Echocardiogram was performed to evaluate LV function. \par \par At the time of the patient's visit a Lexiscan Nuclear Stress Test was performed to evaluate for induced arrhythmia and ischemia. At the time of the visit the results were reviewed with patient \par \par CAD, nonrheumatic AR--We discussed increase in Crestor but will limit to 10 mg up from 5 mg given concern for leg pain

## 2022-02-17 PROBLEM — R06.00 DOE (DYSPNEA ON EXERTION): Status: ACTIVE | Noted: 2020-10-20

## 2022-04-11 ENCOUNTER — RX RENEWAL (OUTPATIENT)
Age: 82
End: 2022-04-11

## 2022-04-13 ENCOUNTER — APPOINTMENT (OUTPATIENT)
Dept: HEART AND VASCULAR | Facility: CLINIC | Age: 82
End: 2022-04-13
Payer: MEDICARE

## 2022-04-13 VITALS
OXYGEN SATURATION: 92 % | TEMPERATURE: 97.6 F | HEIGHT: 63 IN | BODY MASS INDEX: 32.6 KG/M2 | DIASTOLIC BLOOD PRESSURE: 80 MMHG | SYSTOLIC BLOOD PRESSURE: 128 MMHG | WEIGHT: 184 LBS | RESPIRATION RATE: 14 BRPM | HEART RATE: 70 BPM

## 2022-04-13 PROCEDURE — 99214 OFFICE O/P EST MOD 30 MIN: CPT

## 2022-04-13 PROCEDURE — 36415 COLL VENOUS BLD VENIPUNCTURE: CPT

## 2022-04-13 NOTE — ASSESSMENT
[FreeTextEntry1] : Hypertension, Hyperlipidemia, depression\par \par I suggested mental health evaluation. Patient to call their insurance for provider, [facility] referral line at [phone]. If symptoms worsens to go to ER. We discussed [email or web address] as option if all other resources are not successful \par \par Labs were drawn in the office and sent

## 2022-04-13 NOTE — HISTORY OF PRESENT ILLNESS
[FreeTextEntry1] : 81 year female who reports that her Psychiatrist is leaving practice. She reports being stable on her Duloxetine 60 mg daily and Nortriptyline 10 mg - 2 tabs (20 mg) at bedtime doses for a number of years.  She denies having any SOB, KHOURY, dizziness, palpitations, orthopnea, PND or syncope. She notes having GI symptoms chronically. She had reschedule her GI for May

## 2022-04-14 LAB
ALBUMIN SERPL ELPH-MCNC: 4.3 G/DL
ALP BLD-CCNC: 73 U/L
ALT SERPL-CCNC: 13 U/L
ANION GAP SERPL CALC-SCNC: 11 MMOL/L
AST SERPL-CCNC: 12 U/L
BASOPHILS # BLD AUTO: 0.03 K/UL
BASOPHILS NFR BLD AUTO: 0.5 %
BILIRUB SERPL-MCNC: 0.4 MG/DL
BUN SERPL-MCNC: 18 MG/DL
CALCIUM SERPL-MCNC: 10.1 MG/DL
CHLORIDE SERPL-SCNC: 103 MMOL/L
CHOLEST SERPL-MCNC: 151 MG/DL
CO2 SERPL-SCNC: 29 MMOL/L
CREAT SERPL-MCNC: 0.83 MG/DL
EGFR: 70 ML/MIN/1.73M2
EOSINOPHIL # BLD AUTO: 0.18 K/UL
EOSINOPHIL NFR BLD AUTO: 3.1 %
ESTIMATED AVERAGE GLUCOSE: 117 MG/DL
GLUCOSE SERPL-MCNC: 103 MG/DL
HBA1C MFR BLD HPLC: 5.7 %
HCT VFR BLD CALC: 48.2 %
HDLC SERPL-MCNC: 50 MG/DL
HGB BLD-MCNC: 14.2 G/DL
IMM GRANULOCYTES NFR BLD AUTO: 0.3 %
LDLC SERPL CALC-MCNC: 81 MG/DL
LYMPHOCYTES # BLD AUTO: 0.91 K/UL
LYMPHOCYTES NFR BLD AUTO: 15.7 %
MAN DIFF?: NORMAL
MCHC RBC-ENTMCNC: 29.5 GM/DL
MCHC RBC-ENTMCNC: 29.6 PG
MCV RBC AUTO: 100.6 FL
MONOCYTES # BLD AUTO: 0.41 K/UL
MONOCYTES NFR BLD AUTO: 7.1 %
NEUTROPHILS # BLD AUTO: 4.26 K/UL
NEUTROPHILS NFR BLD AUTO: 73.3 %
NONHDLC SERPL-MCNC: 101 MG/DL
PLATELET # BLD AUTO: 185 K/UL
POTASSIUM SERPL-SCNC: 5.2 MMOL/L
PROT SERPL-MCNC: 6.4 G/DL
RBC # BLD: 4.79 M/UL
RBC # FLD: 14 %
SODIUM SERPL-SCNC: 143 MMOL/L
TRIGL SERPL-MCNC: 100 MG/DL
TSH SERPL-ACNC: 1.73 UIU/ML
WBC # FLD AUTO: 5.81 K/UL

## 2022-05-04 ENCOUNTER — NON-APPOINTMENT (OUTPATIENT)
Age: 82
End: 2022-05-04

## 2022-05-05 ENCOUNTER — RX RENEWAL (OUTPATIENT)
Age: 82
End: 2022-05-05

## 2022-05-12 ENCOUNTER — RX RENEWAL (OUTPATIENT)
Age: 82
End: 2022-05-12

## 2022-05-16 ENCOUNTER — APPOINTMENT (OUTPATIENT)
Age: 82
End: 2022-05-16
Payer: MEDICARE

## 2022-05-16 VITALS
OXYGEN SATURATION: 95 % | HEIGHT: 64 IN | RESPIRATION RATE: 15 BRPM | WEIGHT: 184 LBS | BODY MASS INDEX: 31.41 KG/M2 | SYSTOLIC BLOOD PRESSURE: 125 MMHG | DIASTOLIC BLOOD PRESSURE: 78 MMHG | TEMPERATURE: 97.8 F | HEART RATE: 78 BPM

## 2022-05-16 PROCEDURE — 99214 OFFICE O/P EST MOD 30 MIN: CPT

## 2022-05-17 NOTE — HISTORY OF PRESENT ILLNESS
[___ Month(s) Ago] : [unfilled] month(s) ago [Heartburn] : improved heartburn [Nausea] : stable nausea [Vomiting] : improved vomiting [Diarrhea] : improved diarrhea [Yellow Skin Or Eyes (Jaundice)] : denies jaundice [Abdominal Pain] : improved abdominal pain [Abdominal Swelling] : denies abdominal swelling [Rectal Pain] : denies rectal pain [Wt Loss ___ Lbs] : recent [unfilled] ~Upound(s) weight loss [Constipation] : constipation [GERD] : gastroesophageal reflux disease [_________] : Performed [unfilled] [Wt Gain ___ Lbs] : no recent weight gain [de-identified] : 81F with PMHx significant for IBS, GERD, and Diverticulosis - with recurrent diverticulitis, who presents for follow up of constipation/diverticulosis.\par \par 5/16/22\par - continues to have abdominal discomfort \par - Taking 2 senna and 1 Colace at night \par - Reports limited vegetable intake\par - Irregular BM, every other day, straining\par - straining with defecation, denies blood/mucus\par -She said that her symptoms improved and longer gets as frequent cramping\par - Taking famotidine 20 mg for reflux\par - IBgard has been helping her stomach settle and improve gas\par - daily walks\par \par \par \par 2/14/22\par - still having abdominal discomfort which is about the same as prior episodes \par - feels much better with regular bowel movements\par - takes MIRALAX but unsure if really helping improve symptoms \par - no blood in stool \par - does not want another colonoscopy \par - has some nausea when backed up, reports BMs not complete and does have signficant straining \par \par 4/1/19\par -Pt states she is feeling about the same since last visit. Constipation is still a big problem, BM once every 2 days and feels incomplete. She will get bad cramps from her diverticulosis that cause nausea at times and leads to regurgitation. Episodes of nausea last only a few minutes. IBgard has been helping her stomach settle and with gas. She is no longer on a gluten free diet because she does not believe it helped her. Zantac is helping with heartburn. Denies any other GI symptoms. \par \par Previous history:\par 78 yr old F with PMHx significant for IBS, GERD, and Diverticulosis - with recurrent diverticulitis, who presents for evaluation of constipation/differentiation of abd pain.\par Has been having increased constipation - and reportedly goes every 2-3days, and this is associated with some R sided abdominal pain, relieved after BM. Saw Dr Alvares, non operative mgmt, benefiber suggested. IBGARD helps. Her bowel movements are the most part regular, there is no rectal bleeding, nausea or vomiting.\par \par She's come to realize it to have good days and bad days and overall seems in good spirits.\par \par She multiple polyps on her last colonoscopy will require followup and 2020 [de-identified] : multiple small polyps

## 2022-05-17 NOTE — ASSESSMENT
[FreeTextEntry1] : 81F with PMHx significant for IBS, GERD, and Diverticulosis - with recurrent diverticulitis, who presents for follow up of constipation/diverticulosis.\par \par Chronic constipation-\par - Continue Senna 2 tabs and Colace 1 capsule at bedtime \par - increase fluid intake and encourage activity\par - encourage benefiber supplementation \par - can take IBgard or hyoscyamine as needed for discomfort\par \par GERD\par - Cont famotidine PRN \par \par Diverticulosis -\par - conservative mgmt\par - high fiber diet \par \par Esophageal reflux, stable.\par - continue PEPCID 20mg PRN \par \par HCM\par -aged out for colonoscopy \par \par f/u PRN \par \par

## 2022-08-09 ENCOUNTER — APPOINTMENT (OUTPATIENT)
Age: 82
End: 2022-08-09

## 2022-08-09 VITALS
OXYGEN SATURATION: 95 % | HEIGHT: 64 IN | RESPIRATION RATE: 15 BRPM | TEMPERATURE: 96.9 F | SYSTOLIC BLOOD PRESSURE: 120 MMHG | WEIGHT: 183 LBS | HEART RATE: 70 BPM | BODY MASS INDEX: 31.24 KG/M2 | DIASTOLIC BLOOD PRESSURE: 60 MMHG

## 2022-08-09 PROCEDURE — 99214 OFFICE O/P EST MOD 30 MIN: CPT

## 2022-08-09 NOTE — HISTORY OF PRESENT ILLNESS
[de-identified] : She is here today for follow-up visit, overall about the same feels that she is in the continued cui against her stomach and it continues to drain her.  She briefly tried senna but discontinued because of spastic abdominal pain and feels that Colace might be a better option in addition to peppermint capsules, which she was encouraged to increase.  She is on a very restricted diet and has not had salad for example in over a year.  She feels better after evacuating her bowels but worse after a meal, she is trying to keep up with hydration and slowly increasing her activity levels now that the pandemic and her vaccination have allowed her to do so.  She is not planning to go back to [place] anytime soon and feels most comfortable in her apartment.  There are no alarm symptoms.

## 2022-08-09 NOTE — ASSESSMENT
[FreeTextEntry1] : IBS/C\par Diverticulosis\par Abdominal pain\par \par We discussed ways of liberalizing her diet, increasing the fiber, hydration, activity and reassurance.  We will try Colace instead of senna and follow-up in 6 months

## 2022-08-09 NOTE — PHYSICAL EXAM
[General Appearance - Alert] : alert [General Appearance - In No Acute Distress] : in no acute distress [Sclera] : the sclera and conjunctiva were normal [Neck Appearance] : the appearance of the neck was normal [] : no respiratory distress [Respiration, Rhythm And Depth] : normal respiratory rhythm and effort [No CVA Tenderness] : no ~M costovertebral angle tenderness [Abnormal Walk] : normal gait [Musculoskeletal - Swelling] : no joint swelling seen [Skin Color & Pigmentation] : normal skin color and pigmentation [Skin Turgor] : normal skin turgor [Oriented To Time, Place, And Person] : oriented to person, place, and time [Impaired Insight] : insight and judgment were intact

## 2022-10-19 ENCOUNTER — NON-APPOINTMENT (OUTPATIENT)
Age: 82
End: 2022-10-19

## 2022-10-19 ENCOUNTER — APPOINTMENT (OUTPATIENT)
Dept: HEART AND VASCULAR | Facility: CLINIC | Age: 82
End: 2022-10-19

## 2022-10-19 VITALS
HEIGHT: 64 IN | WEIGHT: 184.99 LBS | SYSTOLIC BLOOD PRESSURE: 148 MMHG | OXYGEN SATURATION: 96 % | TEMPERATURE: 98.6 F | HEART RATE: 80 BPM | BODY MASS INDEX: 31.58 KG/M2 | DIASTOLIC BLOOD PRESSURE: 74 MMHG

## 2022-10-19 DIAGNOSIS — R53.83 OTHER FATIGUE: ICD-10-CM

## 2022-10-19 PROCEDURE — 93000 ELECTROCARDIOGRAM COMPLETE: CPT

## 2022-10-19 PROCEDURE — 36415 COLL VENOUS BLD VENIPUNCTURE: CPT

## 2022-10-19 PROCEDURE — 99214 OFFICE O/P EST MOD 30 MIN: CPT

## 2022-10-19 PROCEDURE — 90662 IIV NO PRSV INCREASED AG IM: CPT

## 2022-10-19 PROCEDURE — G0008: CPT

## 2022-10-20 PROBLEM — R53.83 FATIGUE: Status: ACTIVE | Noted: 2019-08-01

## 2022-10-20 LAB
ALBUMIN SERPL ELPH-MCNC: 4.3 G/DL
ALP BLD-CCNC: 78 U/L
ALT SERPL-CCNC: 14 U/L
ANION GAP SERPL CALC-SCNC: 10 MMOL/L
AST SERPL-CCNC: 15 U/L
BASOPHILS # BLD AUTO: 0.04 K/UL
BASOPHILS NFR BLD AUTO: 0.6 %
BILIRUB SERPL-MCNC: 0.4 MG/DL
BUN SERPL-MCNC: 15 MG/DL
CALCIUM SERPL-MCNC: 9.9 MG/DL
CHLORIDE SERPL-SCNC: 108 MMOL/L
CHOLEST SERPL-MCNC: 140 MG/DL
CO2 SERPL-SCNC: 30 MMOL/L
CREAT SERPL-MCNC: 0.69 MG/DL
EGFR: 86 ML/MIN/1.73M2
EOSINOPHIL # BLD AUTO: 0.18 K/UL
EOSINOPHIL NFR BLD AUTO: 2.5 %
ESTIMATED AVERAGE GLUCOSE: 108 MG/DL
GLUCOSE SERPL-MCNC: 114 MG/DL
HBA1C MFR BLD HPLC: 5.4 %
HCT VFR BLD CALC: 45.3 %
HDLC SERPL-MCNC: 51 MG/DL
HGB BLD-MCNC: 14 G/DL
IMM GRANULOCYTES NFR BLD AUTO: 0.1 %
LDLC SERPL CALC-MCNC: 73 MG/DL
LYMPHOCYTES # BLD AUTO: 0.87 K/UL
LYMPHOCYTES NFR BLD AUTO: 12.2 %
MAN DIFF?: NORMAL
MCHC RBC-ENTMCNC: 29.4 PG
MCHC RBC-ENTMCNC: 30.9 GM/DL
MCV RBC AUTO: 95.2 FL
MONOCYTES # BLD AUTO: 0.42 K/UL
MONOCYTES NFR BLD AUTO: 5.9 %
NEUTROPHILS # BLD AUTO: 5.59 K/UL
NEUTROPHILS NFR BLD AUTO: 78.7 %
NONHDLC SERPL-MCNC: 89 MG/DL
PLATELET # BLD AUTO: 196 K/UL
POTASSIUM SERPL-SCNC: 5.5 MMOL/L
PROT SERPL-MCNC: 6.7 G/DL
RBC # BLD: 4.76 M/UL
RBC # FLD: 13.8 %
SODIUM SERPL-SCNC: 147 MMOL/L
TRIGL SERPL-MCNC: 82 MG/DL
TSH SERPL-ACNC: 1 UIU/ML
WBC # FLD AUTO: 7.11 K/UL

## 2022-10-20 NOTE — HISTORY OF PRESENT ILLNESS
[FreeTextEntry1] : 82 year female who comes reporting fatigue. She feels weak in her legs. She denies having any chest pain, SOB, KHOURY, dizziness, palpitations, orthopnea, PND or syncope. She develops pains all over her body that she attributes to her GI symptoms

## 2022-10-20 NOTE — ASSESSMENT
[FreeTextEntry1] : An EKG was performed to evaluate for arrhythmia and ischemia. \par \par Labs were drawn in the office and sent \par \par Fatigue-- may be related to underlying mental health issues, medications or deconditioning. If no other explanation consider halving Atenolol to see if it reduces fatigue\par \par Hypertension-- at goal-- if reducing Atenolol may need to increase Amlodipine and watch for side effects

## 2022-10-28 NOTE — PATIENT PROFILE ADULT - FALL HARM RISK
Pt was eating across from her daughter and all of a sudden daughter states pt had her head down and wasn't responding to her  Daughter states when she woke up she was confused  Pt state she has been dizzy   Pt also states she has been very tired  Pt denies falls   Pt has 1 episode of diarrhea today   Pt is afebrile   Pt has a slow but steady gait other

## 2022-11-18 DIAGNOSIS — M54.9 DORSALGIA, UNSPECIFIED: ICD-10-CM

## 2022-12-15 ENCOUNTER — APPOINTMENT (OUTPATIENT)
Dept: HEART AND VASCULAR | Facility: CLINIC | Age: 82
End: 2022-12-15

## 2022-12-15 VITALS
TEMPERATURE: 97.9 F | HEART RATE: 77 BPM | RESPIRATION RATE: 14 BRPM | DIASTOLIC BLOOD PRESSURE: 82 MMHG | WEIGHT: 185 LBS | HEIGHT: 64 IN | SYSTOLIC BLOOD PRESSURE: 148 MMHG | BODY MASS INDEX: 31.58 KG/M2 | OXYGEN SATURATION: 98 %

## 2022-12-15 PROCEDURE — 99212 OFFICE O/P EST SF 10 MIN: CPT

## 2022-12-15 PROCEDURE — 93000 ELECTROCARDIOGRAM COMPLETE: CPT

## 2022-12-15 PROCEDURE — 36415 COLL VENOUS BLD VENIPUNCTURE: CPT

## 2022-12-15 RX ORDER — SENNOSIDES 8.6 MG TABLETS 8.6 MG/1
8.6 TABLET ORAL
Qty: 60 | Refills: 2 | Status: DISCONTINUED | COMMUNITY
Start: 2022-02-14 | End: 2022-12-15

## 2022-12-15 RX ORDER — HYOSCYAMINE SULFATE 0.12 MG/1
0.12 TABLET SUBLINGUAL
Qty: 30 | Refills: 0 | Status: DISCONTINUED | COMMUNITY
Start: 2021-06-03 | End: 2022-12-15

## 2022-12-15 RX ORDER — HYOSCYAMINE SULFATE 0.12 MG/1
0.12 TABLET, ORALLY DISINTEGRATING ORAL
Qty: 30 | Refills: 0 | Status: DISCONTINUED | COMMUNITY
Start: 2021-05-18 | End: 2022-12-15

## 2022-12-15 RX ORDER — DOCUSATE SODIUM 100 MG/1
100 CAPSULE ORAL
Qty: 60 | Refills: 0 | Status: DISCONTINUED | COMMUNITY
Start: 2022-02-14 | End: 2022-12-15

## 2022-12-15 NOTE — HISTORY OF PRESENT ILLNESS
[FreeTextEntry1] : 82 year female who comes feeling weak. She is unsure if it is emotional or physical. She describes her symptoms starting when she had a minor episode of vertigo. She is now very careful getting up. She recalls vertigo 2 years ago being worse. She went to [facility] and was told no flu or Covid 5 days ago. She feels unsteady and weak. She is walking with a cane. She has reflux when she has stomach spasms

## 2022-12-15 NOTE — ASSESSMENT
[FreeTextEntry1] : An EKG was performed to evaluate for arrhythmia and ischemia.\par \par CAD--obtain cath report\par \par Weakness--followup with Psychiatry and begin Psychotherapy. PT referral\par \par Labs were drawn in the office and sent\par \par I encouraged continued risk factor reduction and gradual increase in aerobic activity as tolerated\par \par 15   minutes were spent discussing cardiac risk excluding procedure time

## 2022-12-15 NOTE — PHYSICAL EXAM
[Normal S1, S2] : normal S1, S2 [Soft] : abdomen soft [No Edema] : no edema [Moves all extremities] : moves all extremities [Normal] : alert and oriented, normal memory

## 2022-12-16 DIAGNOSIS — Z00.00 ENCOUNTER FOR GENERAL ADULT MEDICAL EXAMINATION W/OUT ABNORMAL FINDINGS: ICD-10-CM

## 2022-12-16 LAB
ALBUMIN SERPL ELPH-MCNC: 4.4 G/DL
ALP BLD-CCNC: 78 U/L
ALT SERPL-CCNC: 17 U/L
ANION GAP SERPL CALC-SCNC: 10 MMOL/L
AST SERPL-CCNC: 17 U/L
BASOPHILS # BLD AUTO: 0.02 K/UL
BASOPHILS NFR BLD AUTO: 0.3 %
BILIRUB SERPL-MCNC: 0.3 MG/DL
BUN SERPL-MCNC: 14 MG/DL
CALCIUM SERPL-MCNC: 10.4 MG/DL
CHLORIDE SERPL-SCNC: 104 MMOL/L
CO2 SERPL-SCNC: 32 MMOL/L
CREAT SERPL-MCNC: 0.67 MG/DL
EGFR: 86 ML/MIN/1.73M2
EOSINOPHIL # BLD AUTO: 0.13 K/UL
EOSINOPHIL NFR BLD AUTO: 2.1 %
ERYTHROCYTE [SEDIMENTATION RATE] IN BLOOD BY WESTERGREN METHOD: 11 MM/HR
FOLATE SERPL-MCNC: 9.1 NG/ML
GLUCOSE SERPL-MCNC: 84 MG/DL
HCT VFR BLD CALC: 47.2 %
HGB BLD-MCNC: 14.2 G/DL
IMM GRANULOCYTES NFR BLD AUTO: 0.2 %
LYMPHOCYTES # BLD AUTO: 0.75 K/UL
LYMPHOCYTES NFR BLD AUTO: 12 %
MAN DIFF?: NORMAL
MCHC RBC-ENTMCNC: 29.6 PG
MCHC RBC-ENTMCNC: 30.1 GM/DL
MCV RBC AUTO: 98.3 FL
MONOCYTES # BLD AUTO: 0.47 K/UL
MONOCYTES NFR BLD AUTO: 7.5 %
NEUTROPHILS # BLD AUTO: 4.89 K/UL
NEUTROPHILS NFR BLD AUTO: 77.9 %
PLATELET # BLD AUTO: 182 K/UL
POTASSIUM SERPL-SCNC: 5.3 MMOL/L
PROT SERPL-MCNC: 6.7 G/DL
RBC # BLD: 4.8 M/UL
RBC # FLD: 14.2 %
SODIUM SERPL-SCNC: 145 MMOL/L
VIT B12 SERPL-MCNC: 833 PG/ML
WBC # FLD AUTO: 6.27 K/UL

## 2023-02-15 ENCOUNTER — APPOINTMENT (OUTPATIENT)
Dept: HEART AND VASCULAR | Facility: CLINIC | Age: 83
End: 2023-02-15
Payer: MEDICARE

## 2023-02-15 VITALS
OXYGEN SATURATION: 97 % | TEMPERATURE: 97.9 F | SYSTOLIC BLOOD PRESSURE: 124 MMHG | DIASTOLIC BLOOD PRESSURE: 78 MMHG | BODY MASS INDEX: 32.1 KG/M2 | HEIGHT: 64 IN | WEIGHT: 188 LBS | HEART RATE: 79 BPM | RESPIRATION RATE: 14 BRPM

## 2023-02-15 DIAGNOSIS — L03.90 CELLULITIS, UNSPECIFIED: ICD-10-CM

## 2023-02-15 PROCEDURE — 99213 OFFICE O/P EST LOW 20 MIN: CPT

## 2023-02-15 NOTE — ASSESSMENT
[FreeTextEntry1] : Cellulitis-- start Keflex and followup with Derm until skin resolves\par \par continue PT\par \par I asked her to return for Echocardiogram. \par \par If SOB worsens or occurs without back pain then would proceed with coronary CTA\par \par I informed the patient that pending resolution of her skin lesion I did not find a Medical or Cardiovascular contraindication to the proposed surgery at this time

## 2023-02-15 NOTE — HISTORY OF PRESENT ILLNESS
[FreeTextEntry1] : 82 year female who comes for followup of skin lesion on her left calf. She was prescribed Keflex and Bactroban at Urgent Care 3 weeks ago. She is using Bactroban without resolution. She did not yet start her Keflex. She is planning left cataract surgery with Dr Hollins at [facility] on March 13, 2023. She can walk 1/2 block without stopping. She is in PT and climbing stairs. She notes that her back pain can take her breath away.

## 2023-02-17 ENCOUNTER — RX RENEWAL (OUTPATIENT)
Age: 83
End: 2023-02-17

## 2023-03-10 ENCOUNTER — NON-APPOINTMENT (OUTPATIENT)
Age: 83
End: 2023-03-10

## 2023-04-12 ENCOUNTER — APPOINTMENT (OUTPATIENT)
Dept: HEART AND VASCULAR | Facility: CLINIC | Age: 83
End: 2023-04-12

## 2023-04-12 VITALS
OXYGEN SATURATION: 96 % | DIASTOLIC BLOOD PRESSURE: 74 MMHG | SYSTOLIC BLOOD PRESSURE: 112 MMHG | TEMPERATURE: 98.5 F | HEIGHT: 64 IN | BODY MASS INDEX: 32.44 KG/M2 | WEIGHT: 190 LBS | HEART RATE: 71 BPM

## 2023-04-13 ENCOUNTER — APPOINTMENT (OUTPATIENT)
Dept: HEART AND VASCULAR | Facility: CLINIC | Age: 83
End: 2023-04-13
Payer: MEDICARE

## 2023-04-13 VITALS
WEIGHT: 190 LBS | DIASTOLIC BLOOD PRESSURE: 82 MMHG | RESPIRATION RATE: 14 BRPM | HEART RATE: 81 BPM | BODY MASS INDEX: 32.44 KG/M2 | HEIGHT: 64 IN | SYSTOLIC BLOOD PRESSURE: 144 MMHG | OXYGEN SATURATION: 97 %

## 2023-04-13 DIAGNOSIS — I35.1 NONRHEUMATIC AORTIC (VALVE) INSUFFICIENCY: ICD-10-CM

## 2023-04-13 DIAGNOSIS — I51.7 CARDIOMEGALY: ICD-10-CM

## 2023-04-13 PROCEDURE — 93306 TTE W/DOPPLER COMPLETE: CPT

## 2023-04-13 PROCEDURE — 36415 COLL VENOUS BLD VENIPUNCTURE: CPT

## 2023-04-13 PROCEDURE — 99213 OFFICE O/P EST LOW 20 MIN: CPT

## 2023-04-13 NOTE — PHYSICAL EXAM
[Normal Conjunctiva] : normal conjunctiva [Clear Lung Fields] : clear lung fields [Moves all extremities] : moves all extremities [Normal] : alert and oriented, normal memory [de-identified] : bilateral ankle edema L>R [de-identified] : non-healing lesion left calf

## 2023-04-13 NOTE — HISTORY OF PRESENT ILLNESS
[FreeTextEntry1] : 82 year female who comes for followup after her Echocardiogram. A small pericardial effusion was seen. She notes continued back pain on inclines. She notes having SOB associated with her back pain. She notes left sided pain below her left breast and burning in the center of her chest. She attributes it to reflux but feels she does not really know. Her burning improves with famotidine

## 2023-04-13 NOTE — ASSESSMENT
[FreeTextEntry1] : LVH-- workup for amyloidosis. If negative then coronary CTA\par \par I encouraged continued risk factor reduction and gradual increase in aerobic activity as tolerated\par \par 26   minutes were spent discussing cardiac risk excluding procedure time

## 2023-04-14 LAB
DEPRECATED KAPPA LC FREE/LAMBDA SER: 1.59 RATIO
KAPPA LC CSF-MCNC: 1.33 MG/DL
KAPPA LC SERPL-MCNC: 2.12 MG/DL

## 2023-04-15 LAB — M PROTEIN SPEC IFE-MCNC: NORMAL

## 2023-04-24 ENCOUNTER — APPOINTMENT (OUTPATIENT)
Dept: NUCLEAR MEDICINE | Facility: HOSPITAL | Age: 83
End: 2023-04-24
Payer: MEDICARE

## 2023-04-24 ENCOUNTER — OUTPATIENT (OUTPATIENT)
Dept: OUTPATIENT SERVICES | Facility: HOSPITAL | Age: 83
LOS: 1 days | End: 2023-04-24
Payer: MEDICARE

## 2023-04-24 ENCOUNTER — RESULT REVIEW (OUTPATIENT)
Age: 83
End: 2023-04-24

## 2023-04-24 DIAGNOSIS — Z95.5 PRESENCE OF CORONARY ANGIOPLASTY IMPLANT AND GRAFT: Chronic | ICD-10-CM

## 2023-04-24 DIAGNOSIS — Z90.12 ACQUIRED ABSENCE OF LEFT BREAST AND NIPPLE: Chronic | ICD-10-CM

## 2023-04-24 PROCEDURE — 78830 RP LOCLZJ TUM SPECT W/CT 1: CPT | Mod: 26,MH

## 2023-04-24 PROCEDURE — 78830 RP LOCLZJ TUM SPECT W/CT 1: CPT | Mod: MH

## 2023-04-24 PROCEDURE — A9538: CPT

## 2023-05-08 ENCOUNTER — RX RENEWAL (OUTPATIENT)
Age: 83
End: 2023-05-08

## 2023-07-06 ENCOUNTER — RX RENEWAL (OUTPATIENT)
Age: 83
End: 2023-07-06

## 2023-07-06 RX ORDER — FAMOTIDINE 20 MG/1
20 TABLET, FILM COATED ORAL
Qty: 90 | Refills: 1 | Status: ACTIVE | COMMUNITY
Start: 2019-10-07 | End: 1900-01-01

## 2023-07-14 NOTE — DISCHARGE NOTE ADULT - LAUNCH MEDICATION RECONCILIATION
Subjective   Jorge A is a 30 year old, presenting for the following health issues:  Allergy Injection and Allergy Recheck    HPI     Chief complaint: Follow-up allergies    History of present illness: This is a pleasant 30-year-old gentleman here today for follow-up of allergies.  Began allergy shots in August 2021 and reached maintenance in March 2022.  No systemic reactions.  He takes Zyrtec very rarely.  Otherwise his allergy symptoms have markedly improved.  Less nasal congestion and drainage.  He does have an albuterol inhaler to use and uses it mostly prior to exercise.  He states no cough, wheeze or shortness of breath outside of exercise.            Objective    Pulse 104   Resp 20   SpO2 100%   There is no height or weight on file to calculate BMI.  Physical Exam   Gen: Pleasant male not in acute distress  HEENT: Eyes no erythema of the bulbar or palpebral conjunctiva, no edema.  Nose: No congestion, mucosa normal. Mouth: Throat clear, no lip or tongue edema.     Respiratory: Clear to auscultation bilaterally, no adventitious breath sounds    Skin: No rashes or lesions  Psych: Alert and oriented times 3    Impression report and plan:  1.  Allergic rhinitis  2.  Intermittent asthma    Continue allergy shots.  Continue current medication therapy.  Notify of systemic reaction.  Follow in 1 year. Notify if using albuterol greater than 2 times per week outside of exercise             <<-----Click here for Discharge Medication Review

## 2023-07-17 ENCOUNTER — RX RENEWAL (OUTPATIENT)
Age: 83
End: 2023-07-17

## 2023-10-02 ENCOUNTER — APPOINTMENT (OUTPATIENT)
Dept: HEART AND VASCULAR | Facility: CLINIC | Age: 83
End: 2023-10-02
Payer: MEDICARE

## 2023-10-02 VITALS
WEIGHT: 190 LBS | BODY MASS INDEX: 32.44 KG/M2 | HEART RATE: 81 BPM | TEMPERATURE: 98.5 F | HEIGHT: 64 IN | SYSTOLIC BLOOD PRESSURE: 144 MMHG | DIASTOLIC BLOOD PRESSURE: 72 MMHG | RESPIRATION RATE: 12 BRPM | OXYGEN SATURATION: 96 %

## 2023-10-02 DIAGNOSIS — E78.5 HYPERLIPIDEMIA, UNSPECIFIED: ICD-10-CM

## 2023-10-02 PROCEDURE — 99213 OFFICE O/P EST LOW 20 MIN: CPT

## 2023-10-03 ENCOUNTER — TRANSCRIPTION ENCOUNTER (OUTPATIENT)
Age: 83
End: 2023-10-03

## 2023-10-03 ENCOUNTER — RX RENEWAL (OUTPATIENT)
Age: 83
End: 2023-10-03

## 2023-10-03 LAB
INFLUENZA A RESULT: NOT DETECTED
INFLUENZA B RESULT: NOT DETECTED
RESP SYN VIRUS RESULT: NOT DETECTED
SARS-COV-2 RESULT: DETECTED

## 2023-10-05 DIAGNOSIS — U07.1 COVID-19: ICD-10-CM

## 2023-10-30 ENCOUNTER — NON-APPOINTMENT (OUTPATIENT)
Age: 83
End: 2023-10-30

## 2023-10-30 ENCOUNTER — APPOINTMENT (OUTPATIENT)
Dept: HEART AND VASCULAR | Facility: CLINIC | Age: 83
End: 2023-10-30
Payer: MEDICARE

## 2023-10-30 VITALS
RESPIRATION RATE: 16 BRPM | WEIGHT: 189 LBS | HEART RATE: 73 BPM | DIASTOLIC BLOOD PRESSURE: 72 MMHG | HEIGHT: 64 IN | OXYGEN SATURATION: 97 % | SYSTOLIC BLOOD PRESSURE: 146 MMHG | TEMPERATURE: 97.2 F | BODY MASS INDEX: 32.27 KG/M2

## 2023-10-30 DIAGNOSIS — I10 ESSENTIAL (PRIMARY) HYPERTENSION: ICD-10-CM

## 2023-10-30 DIAGNOSIS — F32.A DEPRESSION, UNSPECIFIED: ICD-10-CM

## 2023-10-30 DIAGNOSIS — I25.10 ATHEROSCLEROTIC HEART DISEASE OF NATIVE CORONARY ARTERY W/OUT ANGINA PECTORIS: ICD-10-CM

## 2023-10-30 PROCEDURE — 36415 COLL VENOUS BLD VENIPUNCTURE: CPT

## 2023-10-30 PROCEDURE — 93000 ELECTROCARDIOGRAM COMPLETE: CPT

## 2023-10-30 PROCEDURE — 90471 IMMUNIZATION ADMIN: CPT

## 2023-10-30 PROCEDURE — 90662 IIV NO PRSV INCREASED AG IM: CPT

## 2023-10-30 PROCEDURE — G0008: CPT

## 2023-10-30 PROCEDURE — 99214 OFFICE O/P EST MOD 30 MIN: CPT | Mod: 25

## 2023-10-31 LAB
ALBUMIN SERPL ELPH-MCNC: 4.1 G/DL
ALP BLD-CCNC: 78 U/L
ALT SERPL-CCNC: 14 U/L
ANION GAP SERPL CALC-SCNC: 10 MMOL/L
AST SERPL-CCNC: 15 U/L
BASOPHILS # BLD AUTO: 0.03 K/UL
BASOPHILS NFR BLD AUTO: 0.5 %
BILIRUB SERPL-MCNC: 0.3 MG/DL
BUN SERPL-MCNC: 19 MG/DL
CALCIUM SERPL-MCNC: 10.1 MG/DL
CHLORIDE SERPL-SCNC: 104 MMOL/L
CHOLEST SERPL-MCNC: 150 MG/DL
CO2 SERPL-SCNC: 28 MMOL/L
CREAT SERPL-MCNC: 0.72 MG/DL
EGFR: 82 ML/MIN/1.73M2
EOSINOPHIL # BLD AUTO: 0.16 K/UL
EOSINOPHIL NFR BLD AUTO: 2.4 %
ESTIMATED AVERAGE GLUCOSE: 126 MG/DL
GLUCOSE SERPL-MCNC: 88 MG/DL
HBA1C MFR BLD HPLC: 6 %
HCT VFR BLD CALC: 46 %
HDLC SERPL-MCNC: 49 MG/DL
HGB BLD-MCNC: 14.2 G/DL
IMM GRANULOCYTES NFR BLD AUTO: 0.3 %
LDLC SERPL CALC-MCNC: 81 MG/DL
LYMPHOCYTES # BLD AUTO: 0.91 K/UL
LYMPHOCYTES NFR BLD AUTO: 13.9 %
MAN DIFF?: NORMAL
MCHC RBC-ENTMCNC: 29.2 PG
MCHC RBC-ENTMCNC: 30.9 GM/DL
MCV RBC AUTO: 94.7 FL
MONOCYTES # BLD AUTO: 0.53 K/UL
MONOCYTES NFR BLD AUTO: 8.1 %
NEUTROPHILS # BLD AUTO: 4.91 K/UL
NEUTROPHILS NFR BLD AUTO: 74.8 %
NONHDLC SERPL-MCNC: 101 MG/DL
PLATELET # BLD AUTO: 187 K/UL
POTASSIUM SERPL-SCNC: 4.9 MMOL/L
PROT SERPL-MCNC: 6.6 G/DL
RBC # BLD: 4.86 M/UL
RBC # FLD: 14 %
SODIUM SERPL-SCNC: 141 MMOL/L
TRIGL SERPL-MCNC: 111 MG/DL
WBC # FLD AUTO: 6.56 K/UL

## 2023-11-03 ENCOUNTER — APPOINTMENT (OUTPATIENT)
Dept: OTOLARYNGOLOGY | Facility: CLINIC | Age: 83
End: 2023-11-03
Payer: MEDICARE

## 2023-11-03 VITALS
HEART RATE: 67 BPM | WEIGHT: 187 LBS | TEMPERATURE: 97.3 F | HEIGHT: 64 IN | DIASTOLIC BLOOD PRESSURE: 80 MMHG | SYSTOLIC BLOOD PRESSURE: 136 MMHG | BODY MASS INDEX: 31.92 KG/M2

## 2023-11-03 DIAGNOSIS — L29.9 PRURITUS, UNSPECIFIED: ICD-10-CM

## 2023-11-03 PROCEDURE — 99203 OFFICE O/P NEW LOW 30 MIN: CPT | Mod: 25

## 2023-11-03 PROCEDURE — 69210 REMOVE IMPACTED EAR WAX UNI: CPT

## 2023-11-06 PROBLEM — L29.9 EAR ITCH: Status: ACTIVE | Noted: 2023-11-06

## 2023-12-07 ENCOUNTER — RX RENEWAL (OUTPATIENT)
Age: 83
End: 2023-12-07

## 2023-12-14 ENCOUNTER — APPOINTMENT (OUTPATIENT)
Dept: HEART AND VASCULAR | Facility: CLINIC | Age: 83
End: 2023-12-14
Payer: MEDICARE

## 2023-12-14 ENCOUNTER — NON-APPOINTMENT (OUTPATIENT)
Age: 83
End: 2023-12-14

## 2023-12-14 VITALS
HEIGHT: 64 IN | DIASTOLIC BLOOD PRESSURE: 80 MMHG | HEART RATE: 86 BPM | SYSTOLIC BLOOD PRESSURE: 130 MMHG | OXYGEN SATURATION: 98 % | BODY MASS INDEX: 32.09 KG/M2 | TEMPERATURE: 98.7 F | WEIGHT: 187.99 LBS

## 2023-12-14 DIAGNOSIS — R07.89 OTHER CHEST PAIN: ICD-10-CM

## 2023-12-14 PROCEDURE — 93015 CV STRESS TEST SUPVJ I&R: CPT

## 2023-12-14 NOTE — PHYSICAL EXAM
[Normal Conjunctiva] : normal conjunctiva [Clear Lung Fields] : clear lung fields [Moves all extremities] : moves all extremities [Normal] : alert and oriented, normal memory [de-identified] : bilateral ankle edema L>R [de-identified] : non-healing lesion left calf

## 2023-12-14 NOTE — ASSESSMENT
[FreeTextEntry1] : At the time of the patient's visit a Stress Test was performed to evaluate for exercise induced arrhythmia and ischemia. At the time of the visit the results were reviewed with patient  I encouraged continued risk factor reduction and gradual increase in aerobic activity as tolerated  10   minutes were spent discussing cardiac risk excluding procedure time

## 2023-12-21 ENCOUNTER — APPOINTMENT (OUTPATIENT)
Age: 83
End: 2023-12-21
Payer: MEDICARE

## 2023-12-21 VITALS
SYSTOLIC BLOOD PRESSURE: 128 MMHG | HEIGHT: 64 IN | RESPIRATION RATE: 16 BRPM | TEMPERATURE: 96.5 F | OXYGEN SATURATION: 96 % | HEART RATE: 83 BPM | WEIGHT: 191 LBS | DIASTOLIC BLOOD PRESSURE: 70 MMHG | BODY MASS INDEX: 32.61 KG/M2

## 2023-12-21 DIAGNOSIS — K21.9 GASTRO-ESOPHAGEAL REFLUX DISEASE W/OUT ESOPHAGITIS: ICD-10-CM

## 2023-12-21 PROCEDURE — 99214 OFFICE O/P EST MOD 30 MIN: CPT

## 2023-12-21 RX ORDER — OMEPRAZOLE MAGNESIUM 20 MG/1
20 TABLET, DELAYED RELEASE ORAL
Qty: 60 | Refills: 5 | Status: ACTIVE | COMMUNITY
Start: 2023-12-21 | End: 1900-01-01

## 2023-12-21 NOTE — ASSESSMENT
[FreeTextEntry1] : Overall doing well, no interval attacks of diverticulitis but occasional constipation attacks, and wondering if there is anything else she can take. No plans to be in [place] this winter. Dealing with a lot of back pain so inactive and gaining weight otherwise no significant new health issues.  We discussed kiwis in lieu of prune juice. No need for colonoscopy.

## 2023-12-21 NOTE — HISTORY OF PRESENT ILLNESS
[___ Month(s) Ago] : [unfilled] month(s) ago [Heartburn] : improved heartburn [Nausea] : stable nausea [Vomiting] : improved vomiting [Diarrhea] : improved diarrhea [Yellow Skin Or Eyes (Jaundice)] : denies jaundice [Abdominal Pain] : improved abdominal pain [Abdominal Swelling] : denies abdominal swelling [Rectal Pain] : denies rectal pain [Wt Gain ___ Lbs] : no recent weight gain [Wt Loss ___ Lbs] : recent [unfilled] ~Upound(s) weight loss [Constipation] : constipation [GERD] : gastroesophageal reflux disease [_________] : Performed [unfilled] [de-identified] : 83F with PMHx significant for IBS, GERD, and diverticulosis - with recurrent diverticulitis, who presents for follow up of constipation/diverticulosis.  5/16/22 - continues to have abdominal discomfort  - Taking 2 senna and 1 Colace at night  - Reports limited vegetable intake - Irregular BM, every other day, straining - straining with defecation, denies blood/mucus -She said that her symptoms improved and longer gets as frequent cramping - Taking famotidine 20 mg for reflux - IBgard has been helping her stomach settle and improve gas - daily walks    2/14/22 - still having abdominal discomfort which is about the same as prior episodes  - feels much better with regular bowel movements - takes MIRALAX but unsure if really helping improve symptoms  - no blood in stool  - does not want another colonoscopy  - has some nausea when backed up, reports BMs not complete and does have signficant straining   4/1/19 -Pt states she is feeling about the same since last visit. Constipation is still a big problem, BM once every 2 days and feels incomplete. She will get bad cramps from her diverticulosis that cause nausea at times and leads to regurgitation. Episodes of nausea last only a few minutes. IBgard has been helping her stomach settle and with gas. She is no longer on a gluten free diet because she does not believe it helped her. Zantac is helping with heartburn. Denies any other GI symptoms.   Previous history: 78 yr old F with PMHx significant for IBS, GERD, and Diverticulosis - with recurrent diverticulitis, who presents for evaluation of constipation/differentiation of abd pain. Has been having increased constipation - and reportedly goes every 2-3days, and this is associated with some R sided abdominal pain, relieved after BM. Saw Dr Alvares, non operative mgmt, benefiber suggested. IBGARD helps. Her bowel movements are the most part regular, there is no rectal bleeding, nausea or vomiting.  She's come to realize it to have good days and bad days and overall seems in good spirits.  She multiple polyps on her last colonoscopy will require followup and 2020 [de-identified] : multiple small polyps

## 2024-01-08 ENCOUNTER — RX RENEWAL (OUTPATIENT)
Age: 84
End: 2024-01-08

## 2024-02-12 ENCOUNTER — RX RENEWAL (OUTPATIENT)
Age: 84
End: 2024-02-12

## 2024-02-14 ENCOUNTER — RX RENEWAL (OUTPATIENT)
Age: 84
End: 2024-02-14

## 2024-02-14 RX ORDER — AMLODIPINE BESYLATE 5 MG/1
5 TABLET ORAL
Qty: 90 | Refills: 4 | Status: ACTIVE | COMMUNITY
Start: 2024-02-14 | End: 1900-01-01

## 2024-02-16 ENCOUNTER — NON-APPOINTMENT (OUTPATIENT)
Age: 84
End: 2024-02-16

## 2024-02-16 ENCOUNTER — APPOINTMENT (OUTPATIENT)
Dept: HEART AND VASCULAR | Facility: CLINIC | Age: 84
End: 2024-02-16
Payer: MEDICARE

## 2024-02-16 VITALS
HEIGHT: 64 IN | DIASTOLIC BLOOD PRESSURE: 76 MMHG | BODY MASS INDEX: 32.44 KG/M2 | OXYGEN SATURATION: 99 % | SYSTOLIC BLOOD PRESSURE: 132 MMHG | HEART RATE: 82 BPM | WEIGHT: 190 LBS | TEMPERATURE: 98.5 F

## 2024-02-16 PROCEDURE — 93000 ELECTROCARDIOGRAM COMPLETE: CPT

## 2024-02-16 PROCEDURE — 36415 COLL VENOUS BLD VENIPUNCTURE: CPT

## 2024-02-16 PROCEDURE — 99214 OFFICE O/P EST MOD 30 MIN: CPT

## 2024-02-16 RX ORDER — OMEPRAZOLE 20 MG/1
20 CAPSULE, DELAYED RELEASE ORAL
Refills: 0 | Status: ACTIVE | COMMUNITY

## 2024-02-19 NOTE — HISTORY OF PRESENT ILLNESS
[FreeTextEntry1] : 82 yo F here for dizziness.   +dizziness for the past 2-3 days. no chest pain, some acid reflux. when she stands up she feels it most severely. She does have history of BPPV and sometimes has to get up slowly from her bed since she feels dizzy/lightheaded. She has not had an episode of dizziness in the last 2 years. has not used Dramamine this episode but has used it in the past. her previous episode of vertigo was similar in character, but much more severe. Also has history of occipital neuralgia, has taken Gabapentin in the past for this but has been off of it for a year.   did have CP -positional with moving her arm, not exertional   ekg -nsr, anteroseptal infarct

## 2024-02-19 NOTE — ASSESSMENT
[FreeTextEntry1] : dizziness -no sxs of arrythmia, vss -not orthostatic in the office today -likely an exacerbation of vertigo -will go to the ER if sxs worsen or doesnt feel safe at home  -encouraged hydration -will follow up with dr fernandez  -examined and discussed with Dr guerra   chest pain -positional -noncardiac, reassurance provided   -follow up with dr guerra as scheduled

## 2024-02-20 DIAGNOSIS — E87.5 HYPERKALEMIA: ICD-10-CM

## 2024-02-20 LAB
ALBUMIN SERPL ELPH-MCNC: 4.4 G/DL
ALP BLD-CCNC: 79 U/L
ALT SERPL-CCNC: 17 U/L
ANION GAP SERPL CALC-SCNC: 9 MMOL/L
AST SERPL-CCNC: 15 U/L
BILIRUB SERPL-MCNC: 0.2 MG/DL
BUN SERPL-MCNC: 15 MG/DL
CALCIUM SERPL-MCNC: 10.4 MG/DL
CHLORIDE SERPL-SCNC: 103 MMOL/L
CO2 SERPL-SCNC: 31 MMOL/L
CREAT SERPL-MCNC: 0.73 MG/DL
EGFR: 81 ML/MIN/1.73M2
GLUCOSE SERPL-MCNC: 75 MG/DL
HCT VFR BLD CALC: 46.3 %
HGB BLD-MCNC: 14.3 G/DL
MCHC RBC-ENTMCNC: 29 PG
MCHC RBC-ENTMCNC: 30.9 GM/DL
MCV RBC AUTO: 93.9 FL
PLATELET # BLD AUTO: 204 K/UL
POTASSIUM SERPL-SCNC: 5.9 MMOL/L
PROT SERPL-MCNC: 6.6 G/DL
RBC # BLD: 4.93 M/UL
RBC # FLD: 13.7 %
SODIUM SERPL-SCNC: 143 MMOL/L
WBC # FLD AUTO: 6.9 K/UL

## 2024-02-26 ENCOUNTER — APPOINTMENT (OUTPATIENT)
Age: 84
End: 2024-02-26
Payer: MEDICARE

## 2024-02-26 DIAGNOSIS — R10.9 UNSPECIFIED ABDOMINAL PAIN: ICD-10-CM

## 2024-02-26 DIAGNOSIS — K59.09 OTHER CONSTIPATION: ICD-10-CM

## 2024-02-26 PROCEDURE — 99442: CPT | Mod: 93

## 2024-02-26 NOTE — HISTORY OF PRESENT ILLNESS
[___ Month(s) Ago] : [unfilled] month(s) ago [Heartburn] : improved heartburn [Nausea] : stable nausea [Vomiting] : improved vomiting [Diarrhea] : improved diarrhea [Yellow Skin Or Eyes (Jaundice)] : denies jaundice [Abdominal Swelling] : denies abdominal swelling [Abdominal Pain] : improved abdominal pain [Rectal Pain] : denies rectal pain [Wt Gain ___ Lbs] : no recent weight gain [Wt Loss ___ Lbs] : recent [unfilled] ~Upound(s) weight loss [Constipation] : constipation [_________] : Performed [unfilled] [GERD] : gastroesophageal reflux disease [de-identified] : 83F with PMHx significant for IBS, GERD, and diverticulosis - with recurrent diverticulitis, who presents for follow up of constipation/diverticulosis, overall do quite well on current regimen of 2 senna caplets in the morning. She has stopped the Pepcid, and she has stopped the Colace. Her abdominal discomfort is limited to the region corresponding to the sigmoid, this pain is relieved upon complete defecation. And her diarrhea is resolved. She's over feeling like she has much better control of her bowel movements, and is in tune with what needs to be done to keep her regular. We discussed that should we start PRN 2 colace capsules as well as two kiwis per day one available (seasonal)  5/16/22 - continues to have abdominal discomfort  - Taking 2 senna and 1 Colace at night  - Reports limited vegetable intake - Irregular BM, every other day, straining - straining with defecation, denies blood/mucus -She said that her symptoms improved and longer gets as frequent cramping - Taking famotidine 20 mg for reflux - IBgard has been helping her stomach settle and improve gas - daily walks    2/14/22 - still having abdominal discomfort which is about the same as prior episodes  - feels much better with regular bowel movements - takes MIRALAX but unsure if really helping improve symptoms  - no blood in stool  - does not want another colonoscopy  - has some nausea when backed up, reports BMs not complete and does have signficant straining   4/1/19 -Pt states she is feeling about the same since last visit. Constipation is still a big problem, BM once every 2 days and feels incomplete. She will get bad cramps from her diverticulosis that cause nausea at times and leads to regurgitation. Episodes of nausea last only a few minutes. IBgard has been helping her stomach settle and with gas. She is no longer on a gluten free diet because she does not believe it helped her. Zantac is helping with heartburn. Denies any other GI symptoms.   Previous history: 78 yr old F with PMHx significant for IBS, GERD, and Diverticulosis - with recurrent diverticulitis, who presents for evaluation of constipation/differentiation of abd pain. Has been having increased constipation - and reportedly goes every 2-3days, and this is associated with some R sided abdominal pain, relieved after BM. Saw Dr Alvares, non operative mgmt, benefiber suggested. IBGARD helps. Her bowel movements are the most part regular, there is no rectal bleeding, nausea or vomiting.  She's come to realize it to have good days and bad days and overall seems in good spirits.  She multiple polyps on her last colonoscopy will require followup and 2020 [de-identified] : multiple small polyps

## 2024-03-05 ENCOUNTER — APPOINTMENT (OUTPATIENT)
Dept: OTOLARYNGOLOGY | Facility: CLINIC | Age: 84
End: 2024-03-05
Payer: MEDICARE

## 2024-03-05 VITALS
SYSTOLIC BLOOD PRESSURE: 134 MMHG | OXYGEN SATURATION: 98 % | WEIGHT: 190 LBS | DIASTOLIC BLOOD PRESSURE: 81 MMHG | HEART RATE: 76 BPM | TEMPERATURE: 97.9 F | BODY MASS INDEX: 32.44 KG/M2 | HEIGHT: 64 IN

## 2024-03-05 DIAGNOSIS — H93.8X3 OTHER SPECIFIED DISORDERS OF EAR, BILATERAL: ICD-10-CM

## 2024-03-05 DIAGNOSIS — H61.23 IMPACTED CERUMEN, BILATERAL: ICD-10-CM

## 2024-03-05 PROCEDURE — 99214 OFFICE O/P EST MOD 30 MIN: CPT | Mod: 25

## 2024-03-05 PROCEDURE — 69210 REMOVE IMPACTED EAR WAX UNI: CPT

## 2024-03-22 ENCOUNTER — APPOINTMENT (OUTPATIENT)
Dept: OTOLARYNGOLOGY | Facility: CLINIC | Age: 84
End: 2024-03-22
Payer: MEDICARE

## 2024-03-22 PROCEDURE — 92550 TYMPANOMETRY & REFLEX THRESH: CPT | Mod: 52

## 2024-03-22 PROCEDURE — 92557 COMPREHENSIVE HEARING TEST: CPT

## 2024-03-22 PROCEDURE — 92540 BASIC VESTIBULAR EVALUATION: CPT

## 2024-03-22 PROCEDURE — 92537 CALORIC VSTBLR TEST W/REC: CPT

## 2024-04-05 LAB
ANION GAP SERPL CALC-SCNC: 14 MMOL/L
BUN SERPL-MCNC: 18 MG/DL
CALCIUM SERPL-MCNC: 9.6 MG/DL
CHLORIDE SERPL-SCNC: 101 MMOL/L
CO2 SERPL-SCNC: 28 MMOL/L
CREAT SERPL-MCNC: 0.82 MG/DL
EGFR: 70 ML/MIN/1.73M2
GLUCOSE SERPL-MCNC: 190 MG/DL
POTASSIUM SERPL-SCNC: 4.3 MMOL/L
SODIUM SERPL-SCNC: 142 MMOL/L

## 2024-04-08 ENCOUNTER — APPOINTMENT (OUTPATIENT)
Dept: OTOLARYNGOLOGY | Facility: CLINIC | Age: 84
End: 2024-04-08
Payer: MEDICARE

## 2024-04-08 VITALS
OXYGEN SATURATION: 96 % | SYSTOLIC BLOOD PRESSURE: 139 MMHG | HEIGHT: 64 IN | HEART RATE: 77 BPM | DIASTOLIC BLOOD PRESSURE: 81 MMHG | BODY MASS INDEX: 32.44 KG/M2 | TEMPERATURE: 97.2 F | WEIGHT: 190 LBS

## 2024-04-08 DIAGNOSIS — H90.3 SENSORINEURAL HEARING LOSS, BILATERAL: ICD-10-CM

## 2024-04-08 DIAGNOSIS — R42 DIZZINESS AND GIDDINESS: ICD-10-CM

## 2024-04-08 PROCEDURE — 99214 OFFICE O/P EST MOD 30 MIN: CPT

## 2024-04-08 NOTE — PHYSICAL EXAM
[Normal] : mucosa is normal [Midline] : trachea located in midline position [de-identified] : b/l impacted cerumen, cleaned away without issue

## 2024-04-08 NOTE — ASSESSMENT
[FreeTextEntry1] : - 83F here for ear cleaning as she was told her ears were impacted by her physician. On exam there is bilateral cerumen impaction which was cleaned away today without issue. Patient noted improvement after this was done. She is complaining of itchiness in her ears and endorses q tip use. Instructed to discontinue q tip use and see if this improves. Discussed that we can try mometasone drops if the symptoms persist.   3/5/24: She has history of occipital neuralgia and complains of pressure in her head which radiates to her ears. She describes this as "piercing" and "tingling;" it lasted for 10 days and resolved.  She was lightheaded at the time. On exam there is evidence of cerumen impaction. This was cleaned today without issue. Patient noted immediate improvement back to baseline. Stuart Hallpike, tandem gait, and Romberg were all negative. I recommended audiogram/ tympanogram and VNG. Follow up with me after.  4/8/24: Audiogram consistent with normal sloping to moderate/severe sensorineural hearing loss bilaterally.  Normal tympanogram bilaterally.  VNG was essentially normal with signs of central findings.  Recommend consultation with neurology (recommended consultation, notes that she has her own and will follow-up with them).  Patient medically cleared for bilateral hearing amplification.  Can follow-up with audiology for hearing aid evaluation.   -Patient is medically cleared for bilateral hearing amplification  -follow-up with audiology for hearing aid consultation -Consultation with neurology -Follow up with me after

## 2024-04-08 NOTE — HISTORY OF PRESENT ILLNESS
[___] : [unfilled] [de-identified] : 11/3/23 83F here today for ear cleaning. She recently saw her doctor since she was feeling pressure in her ears and was told that her ears were filled with wax so she is here to have this evaluated. She endorses some pressure/fullness in the ears that comes about when she gets a cold/gets sick but otherwise feeling okay today. No perceived changes in her hearing. She does endorse itchiness in the ears. +qtips. Denies otalgia, otorrhea, headaches, changes in hearing, dizziness, other vertiginous symptoms, tinnitus.   She does have history of BPPV and sometimes has to get up slowly from her bed since she feels dizzy/lightheaded. She has not had an episode of dizziness in the last 2 years. Uses Dramamine if needed. Also has history of occipital neuralgia, has taken Gabapentin in the past for this.  - 3/5/24: Patient presents for repeat evaluation. She has history of occipital neuralgia and complains of pressure in her head which radiates to her ears. She describes this as "piercing" and "tingling;" it lasted for 10 days and resolved.  She was lightheaded at the time. She denies vertiginous symptoms, changes in hearing, tinnitus, or otorrhea.  She saw her internist who recommended she follow up to check her ears.  - [FreeTextEntry1] : 4/8/24 Symptoms remain unchanged.  Patient presents after audiological workup.  No new ENT issues otherwise.

## 2024-04-08 NOTE — DATA REVIEWED
[de-identified] : - 3/22/2024 Audiogram and tympanogram Within normal limits sloping to moderate/severe sensorineural hearing loss bilaterally Tympanogram type A bilaterally  --------------------------- VNG The patient demonstrated a caloric reduced vestibular response to 3% in the right ear.  This RVR value is within normal limits.  From the 4 irrigations, right beating nystagmus was 3% stronger than the left beating nystagmus.  This is within normal limits.  VNG showed no clinically significant gaze, spontaneous.  The Stuart-Hallpike test for BPPV was negative both sides.  Bithermal caloric irrigations produced robust and symmetrical nystagmus.  Caloric fixation suppression was normal.  Cicada guide velocity and accuracy were normal.  The eye-movement recordings and the optokinetic test showed low gain.  This is a central ocular motor finding.  The eye-movement recordings and the pursuit test showed low gain, this is a central ocular motor finding.  Low-level up beating nystagmus was observed in head right, head left, and body right static body positions.  These are consistent with central findings.

## 2024-05-28 ENCOUNTER — RX RENEWAL (OUTPATIENT)
Age: 84
End: 2024-05-28

## 2024-05-28 RX ORDER — ATENOLOL 50 MG/1
50 TABLET ORAL
Qty: 90 | Refills: 1 | Status: ACTIVE | COMMUNITY
Start: 2019-02-14 | End: 1900-01-01

## 2024-06-11 ENCOUNTER — APPOINTMENT (OUTPATIENT)
Dept: GASTROENTEROLOGY | Facility: CLINIC | Age: 84
End: 2024-06-11
Payer: MEDICARE

## 2024-06-11 ENCOUNTER — NON-APPOINTMENT (OUTPATIENT)
Age: 84
End: 2024-06-11

## 2024-06-11 VITALS
DIASTOLIC BLOOD PRESSURE: 65 MMHG | TEMPERATURE: 97 F | HEIGHT: 64 IN | HEART RATE: 87 BPM | WEIGHT: 183 LBS | SYSTOLIC BLOOD PRESSURE: 120 MMHG | BODY MASS INDEX: 31.24 KG/M2 | RESPIRATION RATE: 16 BRPM | OXYGEN SATURATION: 97 %

## 2024-06-11 DIAGNOSIS — K62.5 HEMORRHAGE OF ANUS AND RECTUM: ICD-10-CM

## 2024-06-11 DIAGNOSIS — K57.90 DIVERTICULOSIS OF INTESTINE, PART UNSPECIFIED, W/OUT PERFORATION OR ABSCESS W/OUT BLEEDING: ICD-10-CM

## 2024-06-11 PROCEDURE — 99213 OFFICE O/P EST LOW 20 MIN: CPT

## 2024-06-11 NOTE — PHYSICAL EXAM
[No Respiratory Distress] : no respiratory distress [Respiration, Rhythm And Depth] : normal respiratory rhythm and effort [Abdomen Tenderness] : non-tender [No Masses] : no abdominal mass palpated [Normal] : oriented to person, place, and time

## 2024-06-11 NOTE — ASSESSMENT
[FreeTextEntry1] :  84F with PMHx significant for IBS, GERD, and diverticulosis - with recurrent diverticulitis and several weeks of episodic BRBPR  Favor hemorrhoidal bleeding given intermittent small volume nature. Less likely diverticular bleeding for this reason.  No abd pain makes diverticulitis unlikely.  CRC also unlikely given 2017 colonoscopy.  -Check CBC today -Further mgmt pending findings as pt expressed that she is not interested in another colonoscopy -Continue psyllium  Scar Norton DO GI Fellow [facility]

## 2024-06-11 NOTE — HISTORY OF PRESENT ILLNESS
[___ Month(s) Ago] : [unfilled] month(s) ago [Wt Loss ___ Lbs] : recent [unfilled] ~Upound(s) weight loss [_________] : Performed [unfilled] [de-identified] : 84F with PMHx significant for IBS, GERD, and diverticulosis - with recurrent diverticulitis, who presents today for several weeks of intermittent BRBPR.  First noticed very small amounts of blood in stool about 2 weeks ago. She would notice similar episodes every few days, but no more than 1x/day on a pad.  She saw her gynecologist for her annual appointment and had an exam done though she notes no external hemorrhoids noted and she was encouraged to see GI.  Last night, she noticed a considerable amount more BRBPR on pad when she went to use bathroom.  Has not noticed any further episodes since.  She had a BM this AM and stool was formed, brown. She noticed a scant amount of blood on toilet paper.  She otherwise denies abd pain, n/v, synocpe or presyncope.  She has been using psyllium husk for constipation since last visit in Feb 2024 which has been effective  Colonoscopy: Performed 02/2017 . multiple small polyps, diverticulosis

## 2024-06-12 ENCOUNTER — NON-APPOINTMENT (OUTPATIENT)
Age: 84
End: 2024-06-12

## 2024-06-12 LAB
BASOPHILS # BLD AUTO: 0.02 K/UL
BASOPHILS NFR BLD AUTO: 0.3 %
EOSINOPHIL # BLD AUTO: 0.14 K/UL
EOSINOPHIL NFR BLD AUTO: 2.4 %
HCT VFR BLD CALC: 46.5 %
HGB BLD-MCNC: 14 G/DL
IMM GRANULOCYTES NFR BLD AUTO: 0.2 %
LYMPHOCYTES # BLD AUTO: 0.82 K/UL
LYMPHOCYTES NFR BLD AUTO: 14.1 %
MAN DIFF?: NORMAL
MCHC RBC-ENTMCNC: 28.8 PG
MCHC RBC-ENTMCNC: 30.1 GM/DL
MCV RBC AUTO: 95.7 FL
MONOCYTES # BLD AUTO: 0.35 K/UL
MONOCYTES NFR BLD AUTO: 6 %
NEUTROPHILS # BLD AUTO: 4.46 K/UL
NEUTROPHILS NFR BLD AUTO: 77 %
PLATELET # BLD AUTO: 197 K/UL
RBC # BLD: 4.86 M/UL
RBC # FLD: 15.2 %
WBC # FLD AUTO: 5.8 K/UL

## 2024-06-24 ENCOUNTER — APPOINTMENT (OUTPATIENT)
Dept: NEUROLOGY | Facility: CLINIC | Age: 84
End: 2024-06-24

## 2024-07-02 ENCOUNTER — NON-APPOINTMENT (OUTPATIENT)
Age: 84
End: 2024-07-02

## 2024-10-14 ENCOUNTER — APPOINTMENT (OUTPATIENT)
Dept: GERIATRICS | Facility: CLINIC | Age: 84
End: 2024-10-14

## 2024-10-14 ENCOUNTER — LABORATORY RESULT (OUTPATIENT)
Age: 84
End: 2024-10-14

## 2024-10-14 ENCOUNTER — APPOINTMENT (OUTPATIENT)
Dept: GERIATRICS | Facility: CLINIC | Age: 84
End: 2024-10-14
Payer: MEDICARE

## 2024-10-14 VITALS
SYSTOLIC BLOOD PRESSURE: 135 MMHG | WEIGHT: 186 LBS | HEART RATE: 77 BPM | HEIGHT: 63 IN | OXYGEN SATURATION: 96 % | TEMPERATURE: 98 F | DIASTOLIC BLOOD PRESSURE: 80 MMHG | BODY MASS INDEX: 32.96 KG/M2

## 2024-10-14 DIAGNOSIS — K58.9 IRRITABLE BOWEL SYNDROME, UNSPECIFIED: ICD-10-CM

## 2024-10-14 DIAGNOSIS — R26.9 UNSPECIFIED ABNORMALITIES OF GAIT AND MOBILITY: ICD-10-CM

## 2024-10-14 DIAGNOSIS — Z01.89 ENCOUNTER FOR OTHER SPECIFIED SPECIAL EXAMINATIONS: ICD-10-CM

## 2024-10-14 DIAGNOSIS — E55.9 VITAMIN D DEFICIENCY, UNSPECIFIED: ICD-10-CM

## 2024-10-14 DIAGNOSIS — E04.1 NONTOXIC SINGLE THYROID NODULE: ICD-10-CM

## 2024-10-14 DIAGNOSIS — M25.562 PAIN IN LEFT KNEE: ICD-10-CM

## 2024-10-14 DIAGNOSIS — I10 ESSENTIAL (PRIMARY) HYPERTENSION: ICD-10-CM

## 2024-10-14 DIAGNOSIS — R30.0 DYSURIA: ICD-10-CM

## 2024-10-14 DIAGNOSIS — R73.03 PREDIABETES.: ICD-10-CM

## 2024-10-14 DIAGNOSIS — K62.5 HEMORRHAGE OF ANUS AND RECTUM: ICD-10-CM

## 2024-10-14 DIAGNOSIS — G43.909 MIGRAINE, UNSPECIFIED, NOT INTRACTABLE, W/OUT STATUS MIGRAINOSUS: ICD-10-CM

## 2024-10-14 DIAGNOSIS — M54.9 DORSALGIA, UNSPECIFIED: ICD-10-CM

## 2024-10-14 PROCEDURE — G0008: CPT

## 2024-10-14 PROCEDURE — G2212 PROLONG OUTPT/OFFICE VIS: CPT

## 2024-10-14 PROCEDURE — 90662 IIV NO PRSV INCREASED AG IM: CPT

## 2024-10-14 PROCEDURE — 99205 OFFICE O/P NEW HI 60 MIN: CPT

## 2024-10-14 PROCEDURE — 36415 COLL VENOUS BLD VENIPUNCTURE: CPT

## 2024-10-15 PROBLEM — Z01.89 ENCOUNTER FOR GERIATRIC ASSESSMENT: Status: ACTIVE | Noted: 2024-10-15

## 2024-10-15 PROBLEM — R26.9 GAIT ABNORMALITY: Status: ACTIVE | Noted: 2024-10-15

## 2024-10-15 LAB
25(OH)D3 SERPL-MCNC: 15.5 NG/ML
ALBUMIN SERPL ELPH-MCNC: 4.2 G/DL
ALP BLD-CCNC: 83 U/L
ALT SERPL-CCNC: 15 U/L
ANION GAP SERPL CALC-SCNC: 13 MMOL/L
APPEARANCE: CLEAR
AST SERPL-CCNC: 15 U/L
BILIRUB SERPL-MCNC: 0.3 MG/DL
BILIRUBIN URINE: NEGATIVE
BLOOD URINE: NEGATIVE
BUN SERPL-MCNC: 20 MG/DL
CALCIUM SERPL-MCNC: 10.1 MG/DL
CHLORIDE SERPL-SCNC: 103 MMOL/L
CHOLEST SERPL-MCNC: 148 MG/DL
CO2 SERPL-SCNC: 30 MMOL/L
COLOR: NORMAL
CREAT SERPL-MCNC: 0.74 MG/DL
EGFR: 79 ML/MIN/1.73M2
ESTIMATED AVERAGE GLUCOSE: 123 MG/DL
GLUCOSE QUALITATIVE U: NEGATIVE MG/DL
GLUCOSE SERPL-MCNC: 91 MG/DL
HBA1C MFR BLD HPLC: 5.9 %
HCT VFR BLD CALC: 47.7 %
HDLC SERPL-MCNC: 49 MG/DL
HGB BLD-MCNC: 13.8 G/DL
KETONES URINE: ABNORMAL MG/DL
LDLC SERPL CALC-MCNC: 80 MG/DL
LEUKOCYTE ESTERASE URINE: ABNORMAL
MCHC RBC-ENTMCNC: 28.9 GM/DL
MCHC RBC-ENTMCNC: 29.2 PG
MCV RBC AUTO: 100.8 FL
NITRITE URINE: NEGATIVE
NONHDLC SERPL-MCNC: 99 MG/DL
PH URINE: 5.5
PLATELET # BLD AUTO: 212 K/UL
POTASSIUM SERPL-SCNC: 4.3 MMOL/L
PROT SERPL-MCNC: 6.9 G/DL
PROTEIN URINE: 30 MG/DL
RBC # BLD: 4.73 M/UL
RBC # FLD: 13.9 %
SODIUM SERPL-SCNC: 146 MMOL/L
SPECIFIC GRAVITY URINE: >1.03
TRIGL SERPL-MCNC: 100 MG/DL
TSH SERPL-ACNC: 1.4 UIU/ML
UROBILINOGEN URINE: 1 MG/DL
VIT B12 SERPL-MCNC: 860 PG/ML
WBC # FLD AUTO: 8.31 K/UL

## 2024-10-15 RX ORDER — CHOLECALCIFEROL (VITAMIN D3) 1250 MCG
1.25 MG CAPSULE ORAL
Qty: 12 | Refills: 1 | Status: ACTIVE | COMMUNITY
Start: 2024-10-15 | End: 1900-01-01

## 2024-10-21 ENCOUNTER — RX RENEWAL (OUTPATIENT)
Age: 84
End: 2024-10-21

## 2024-11-11 ENCOUNTER — APPOINTMENT (OUTPATIENT)
Dept: GERIATRICS | Facility: CLINIC | Age: 84
End: 2024-11-11
Payer: MEDICARE

## 2024-11-11 VITALS
HEIGHT: 63 IN | WEIGHT: 184 LBS | SYSTOLIC BLOOD PRESSURE: 137 MMHG | BODY MASS INDEX: 32.6 KG/M2 | OXYGEN SATURATION: 98 % | HEART RATE: 70 BPM | DIASTOLIC BLOOD PRESSURE: 80 MMHG | TEMPERATURE: 97.52 F

## 2024-11-11 DIAGNOSIS — I10 ESSENTIAL (PRIMARY) HYPERTENSION: ICD-10-CM

## 2024-11-11 DIAGNOSIS — E87.0 HYPEROSMOLALITY AND HYPERNATREMIA: ICD-10-CM

## 2024-11-11 DIAGNOSIS — N94.89 OTHER SPECIFIED CONDITIONS ASSOCIATED WITH FEMALE GENITAL ORGANS AND MENSTRUAL CYCLE: ICD-10-CM

## 2024-11-11 DIAGNOSIS — K59.09 OTHER CONSTIPATION: ICD-10-CM

## 2024-11-11 DIAGNOSIS — B37.31 ACUTE CANDIDIASIS OF VULVA AND VAGINA: ICD-10-CM

## 2024-11-11 DIAGNOSIS — E87.5 HYPERKALEMIA: ICD-10-CM

## 2024-11-11 DIAGNOSIS — F32.A DEPRESSION, UNSPECIFIED: ICD-10-CM

## 2024-11-11 DIAGNOSIS — E55.9 VITAMIN D DEFICIENCY, UNSPECIFIED: ICD-10-CM

## 2024-11-11 PROCEDURE — 90677 PCV20 VACCINE IM: CPT

## 2024-11-11 PROCEDURE — G2211 COMPLEX E/M VISIT ADD ON: CPT

## 2024-11-11 PROCEDURE — 36415 COLL VENOUS BLD VENIPUNCTURE: CPT

## 2024-11-11 PROCEDURE — 99215 OFFICE O/P EST HI 40 MIN: CPT

## 2024-11-11 PROCEDURE — G0009: CPT

## 2024-11-12 ENCOUNTER — EMERGENCY (EMERGENCY)
Facility: HOSPITAL | Age: 84
LOS: 1 days | Discharge: HOME | End: 2024-11-12
Attending: EMERGENCY MEDICINE | Admitting: EMERGENCY MEDICINE
Payer: MEDICARE

## 2024-11-12 ENCOUNTER — NON-APPOINTMENT (OUTPATIENT)
Age: 84
End: 2024-11-12

## 2024-11-12 VITALS
WEIGHT: 184.09 LBS | HEIGHT: 63 IN | RESPIRATION RATE: 18 BRPM | SYSTOLIC BLOOD PRESSURE: 143 MMHG | HEART RATE: 81 BPM | TEMPERATURE: 99 F | DIASTOLIC BLOOD PRESSURE: 86 MMHG | OXYGEN SATURATION: 96 %

## 2024-11-12 VITALS
DIASTOLIC BLOOD PRESSURE: 67 MMHG | RESPIRATION RATE: 16 BRPM | HEART RATE: 78 BPM | SYSTOLIC BLOOD PRESSURE: 133 MMHG | TEMPERATURE: 98 F | OXYGEN SATURATION: 95 %

## 2024-11-12 DIAGNOSIS — Z90.12 ACQUIRED ABSENCE OF LEFT BREAST AND NIPPLE: Chronic | ICD-10-CM

## 2024-11-12 DIAGNOSIS — Z95.5 PRESENCE OF CORONARY ANGIOPLASTY IMPLANT AND GRAFT: Chronic | ICD-10-CM

## 2024-11-12 LAB
25(OH)D3 SERPL-MCNC: 29.6 NG/ML
ANION GAP SERPL CALC-SCNC: 12 MMOL/L
ANION GAP SERPL CALC-SCNC: 6 MMOL/L — SIGNIFICANT CHANGE UP (ref 5–17)
BUN SERPL-MCNC: 15 MG/DL
BUN SERPL-MCNC: 16 MG/DL — SIGNIFICANT CHANGE UP (ref 7–23)
CALCIUM SERPL-MCNC: 10 MG/DL — SIGNIFICANT CHANGE UP (ref 8.4–10.5)
CALCIUM SERPL-MCNC: 10.4 MG/DL
CHLORIDE SERPL-SCNC: 101 MMOL/L
CHLORIDE SERPL-SCNC: 103 MMOL/L — SIGNIFICANT CHANGE UP (ref 96–108)
CO2 SERPL-SCNC: 28 MMOL/L
CO2 SERPL-SCNC: 31 MMOL/L — SIGNIFICANT CHANGE UP (ref 22–31)
CREAT SERPL-MCNC: 0.7 MG/DL
CREAT SERPL-MCNC: 0.78 MG/DL — SIGNIFICANT CHANGE UP (ref 0.5–1.3)
EGFR: 74 ML/MIN/1.73M2 — SIGNIFICANT CHANGE UP
EGFR: 84 ML/MIN/1.73M2
GAS PNL BLDV: SIGNIFICANT CHANGE UP
GLUCOSE SERPL-MCNC: 131 MG/DL — HIGH (ref 70–99)
GLUCOSE SERPL-MCNC: 65 MG/DL
HCT VFR BLD CALC: 47.4 %
HGB BLD-MCNC: 14.2 G/DL
MCHC RBC-ENTMCNC: 29.4 PG
MCHC RBC-ENTMCNC: 30 G/DL
MCV RBC AUTO: 98.1 FL
PLATELET # BLD AUTO: 199 K/UL
POTASSIUM SERPL-MCNC: 5.3 MMOL/L — SIGNIFICANT CHANGE UP (ref 3.5–5.3)
POTASSIUM SERPL-SCNC: 5.3 MMOL/L — SIGNIFICANT CHANGE UP (ref 3.5–5.3)
POTASSIUM SERPL-SCNC: 6.4 MMOL/L
RBC # BLD: 4.83 M/UL
RBC # FLD: 14.4 %
SODIUM SERPL-SCNC: 140 MMOL/L — SIGNIFICANT CHANGE UP (ref 135–145)
SODIUM SERPL-SCNC: 141 MMOL/L
WBC # FLD AUTO: 5.94 K/UL

## 2024-11-12 PROCEDURE — 99284 EMERGENCY DEPT VISIT MOD MDM: CPT

## 2024-11-12 RX ORDER — SODIUM ZIRCONIUM CYCLOSILICATE 10 G/10G
10 POWDER, FOR SUSPENSION ORAL DAILY
Qty: 1 | Refills: 0 | Status: ACTIVE | COMMUNITY
Start: 2024-11-12 | End: 1900-01-01

## 2024-11-12 NOTE — ED PROVIDER NOTE - CLINICAL SUMMARY MEDICAL DECISION MAKING FREE TEXT BOX
84F PMH HTN, HLD, CAD s/p stent, Carotid artery plaque, breast cancer, IBS, GERD,  diverticulosis w/  recurrent diverticulitis, Constipation, depression, arthritis of knees, chronic back pain, h/o , thyroid nodule, p/w concern for hyperkalemia. Pt had routine f/u w/ geriatrician and labs were taken and K came back elevated and advised to go to ED. Pt initially feeling like usual self but states that while in ED she feels nervous and her heart beating slightly fast. No other systemic symptoms.   Vitals wnl, exam as above.   K 5.3, other labs grossly wnl.   Pt now asymptomatic.   ddx: Likely lab error vs. improvement in K. K now only minimally elevated.   Pt prescribed lokelma by PMD, also usually eats lots of bananas.   Discussed importance of outpt follow up and return precautions. Clinically no indication for further emergent ED workup or hospitalization at this time. Stable for dc, outpt f/u.

## 2024-11-12 NOTE — ED ADULT NURSE NOTE - NSICDXPASTMEDICALHX_GEN_ALL_CORE_FT
PAST MEDICAL HISTORY:  Atherosclerosis of coronary artery s/p PCI    Breast cancer in female Left    Diverticulosis     Essential hypertension HTN (hypertension)    Gastroesophageal reflux disease GERD (gastroesophageal reflux disease)    Hyperlipidemia HLD (hyperlipidemia)    Migraine

## 2024-11-12 NOTE — ED ADULT NURSE NOTE - TEMPLATE
Detail Level: Detailed General Quality 130: Documentation Of Current Medications In The Medical Record: Current Medications Documented

## 2024-11-12 NOTE — ED PROVIDER NOTE - NSFOLLOWUPINSTRUCTIONS_ED_ALL_ED_FT
Stay well hydrated. Avoid high potassium foods like bananas.     Return for fevers, persistent vomit, uncontrolled pain, worsening breathing, worsening lightheaded, worsening palpitations or numbness.    Follow up with your primary doctor as soon as possible.     Take the lokelma that you were prescribed - but take it only once.     Hyperkalemia    Hyperkalemia occurs when the level of potassium in your blood is too high. Potassium is an important nutrient that helps the muscles and nerves function normally. It affects how the heart works, and it helps keep fluids and minerals balanced in the body. If there is too much potassium in your blood, it can affect your heart's ability to function normally.    Potassium is normally removed (excreted) from the body by the kidneys. Hyperkalemia can result from various conditions. It can range from mild to severe.    What are the causes?    This condition may be caused by:  Taking in too much potassium. You can do this by:  Using salt substitutes. They contain large amounts of potassium.  Taking potassium supplements.  Eating foods that are high in potassium.    Excreting too little potassium.   This can happen if:  Your kidneys are not working properly. Kidney (renal) disease, including short-term or long-term renal failure, is a common cause of hyperkalemia.  You are taking medicines that lower your excretion of potassium.  You have Lucas's disease.  You have a urinary tract blockage, such as kidney stones.  You are on treatment to mechanically clean your blood (dialysis) and you skip a treatment.   Releasing a high amount of potassium from your cells into your blood. This can happen with:  Injury to muscles (rhabdomyolysis) or other tissues. Most potassium is stored in your muscles.   Severe burns or infections.  Acidic blood plasma (acidosis). Acidosis can result from many diseases, such as uncontrolled diabetes.    What increases the risk?  The following factors may make you more likely to develop this condition:  Kidney disease. This puts you at the highest risk.  Lucas's disease. This is a condition where the adrenal glands do not produce enough hormones.  Alcoholism or heavy drug use.  Using certain blood pressure medicines, such as ACE inhibitors, angiotensin II receptor blockers (ARBs), or potassium-sparing diuretics such as spironolactone.  Severe injury or burn.    What are the signs or symptoms?  In many cases, there are no symptoms. However, when your potassium level becomes high enough, you may have symptoms such as:  An irregular or very slow heartbeat. Nausea. Tiredness (fatigue). Confusion. Tingling of your skin or numbness of your hands or feet. Muscle cramps. Muscle weakness. Not being able to move (paralysis).    How is this diagnosed?  This condition may be diagnosed based on:  Your symptoms and medical history. Your health care provider will ask about your use of prescription and non-prescription drugs. A physical exam. Blood tests. An electrocardiogram (ECG).    How is this treated?  Treatment depends on the cause and severity of your condition. Treatment may need to be done in the hospital setting. Treatment may include:  IV glucose (sugar) along with insulin to shift potassium out of your blood and into your cells.  A medicine called albuterol to shift potassium out of your blood and into your cells.  Medicines to remove the potassium from your body.  Dialysis to remove the potassium from your body.  Calcium to protect your heart from the effects of high potassium, such as irregular rhythms (arrhythmias).    Follow these instructions at home:     Take over-the-counter and prescription medicines only as told by your health care provider.  Do not take any supplements, natural products, herbs, or vitamins without reviewing them with your health care provider. Certain supplements and natural food products contain high amounts of potassium. Limit your alcohol intake as told by your health care provider. Do not use drugs. If you need help quitting, ask your health care provider.If you have kidney disease, you may need to follow a low-potassium diet. A dietitian can help you learn which foods have high or low amounts of potassium. Keep all follow-up visits as told by your health care provider. This is important.    Contact a health care provider if you:  Have an irregular or very slow heartbeat.Feel light-headed.Feel weak.Are nauseous.Have tingling or numbness in your hands or feet.Get help right away if you:  Have shortness of breath.Have chest pain or discomfort.Pass out.Have muscle paralysis.

## 2024-11-12 NOTE — ED PROVIDER NOTE - OBJECTIVE STATEMENT
84F PMH HTN, HLD, CAD s/p stent, Carotid artery plaque, breast cancer, IBS, GERD,  diverticulosis w/  recurrent diverticulitis, Constipation, depression, arthritis of knees, chronic back pain, h/o , thyroid nodule, p/w concern for hyperkalemia. Pt had routine f/u w/ geriatrician and labs were taken and K came back elevated and advised to go to ED. Pt initially feeling like usual self but states that while in ED she feels nervous and her heart beating slightly fast. No other systemic symptoms.   Denies f/c, URI symptoms, NVD, abd pain, urinary complaints, lightheaded, muscle aches.

## 2024-11-12 NOTE — ED ADULT TRIAGE NOTE - CHIEF COMPLAINT QUOTE
Pt presents to ED here for hyperkalemia. Pt was seen at [facility] and was sent here for rpt testing. Pt has hx of cardaic stents, HTN and HLD. Pt A&Ox4, NAD. EKG in prog.

## 2024-11-12 NOTE — ED ADULT NURSE NOTE - OBJECTIVE STATEMENT
84 y.o. female presents to ED c/o hyperkalemic abnormal lab result. Pt states since finding out she had abnormal/critical results she has had palpitations. denies cp, sob, n/v/d, dizziness, syncope, recent f/c. A/Ox4. Unlabored even respirations, no acute distress. Ambulatory with steady gait independently.

## 2024-11-12 NOTE — ED PROVIDER NOTE - PATIENT PORTAL LINK FT
You can access the FollowMyHealth Patient Portal offered by [facility] by registering at the following website: [email or web address]. By joining [facility]’s FollowMyHealth portal, you will also be able to view your health information using other applications (apps) compatible with our system.

## 2024-11-14 DIAGNOSIS — Z95.5 PRESENCE OF CORONARY ANGIOPLASTY IMPLANT AND GRAFT: ICD-10-CM

## 2024-11-14 DIAGNOSIS — R79.9 ABNORMAL FINDING OF BLOOD CHEMISTRY, UNSPECIFIED: ICD-10-CM

## 2024-11-14 DIAGNOSIS — K58.9 IRRITABLE BOWEL SYNDROME, UNSPECIFIED: ICD-10-CM

## 2024-11-14 DIAGNOSIS — M19.90 UNSPECIFIED OSTEOARTHRITIS, UNSPECIFIED SITE: ICD-10-CM

## 2024-11-14 DIAGNOSIS — E87.5 HYPERKALEMIA: ICD-10-CM

## 2024-11-14 DIAGNOSIS — I25.10 ATHEROSCLEROTIC HEART DISEASE OF NATIVE CORONARY ARTERY WITHOUT ANGINA PECTORIS: ICD-10-CM

## 2024-11-14 DIAGNOSIS — Z86.79 PERSONAL HISTORY OF OTHER DISEASES OF THE CIRCULATORY SYSTEM: ICD-10-CM

## 2024-11-14 DIAGNOSIS — Z91.011 ALLERGY TO MILK PRODUCTS: ICD-10-CM

## 2024-11-14 DIAGNOSIS — E78.5 HYPERLIPIDEMIA, UNSPECIFIED: ICD-10-CM

## 2024-11-14 DIAGNOSIS — Z88.1 ALLERGY STATUS TO OTHER ANTIBIOTIC AGENTS: ICD-10-CM

## 2024-11-14 DIAGNOSIS — F32.A DEPRESSION, UNSPECIFIED: ICD-10-CM

## 2024-11-14 DIAGNOSIS — Z85.3 PERSONAL HISTORY OF MALIGNANT NEOPLASM OF BREAST: ICD-10-CM

## 2024-11-14 DIAGNOSIS — K21.9 GASTRO-ESOPHAGEAL REFLUX DISEASE WITHOUT ESOPHAGITIS: ICD-10-CM

## 2024-11-14 DIAGNOSIS — I10 ESSENTIAL (PRIMARY) HYPERTENSION: ICD-10-CM

## 2024-11-14 DIAGNOSIS — K57.30 DIVERTICULOSIS OF LARGE INTESTINE WITHOUT PERFORATION OR ABSCESS WITHOUT BLEEDING: ICD-10-CM

## 2024-11-20 PROCEDURE — 36415 COLL VENOUS BLD VENIPUNCTURE: CPT

## 2024-11-20 PROCEDURE — 82803 BLOOD GASES ANY COMBINATION: CPT

## 2024-11-20 PROCEDURE — 82330 ASSAY OF CALCIUM: CPT

## 2024-11-20 PROCEDURE — 99283 EMERGENCY DEPT VISIT LOW MDM: CPT | Mod: 25

## 2024-11-20 PROCEDURE — 36000 PLACE NEEDLE IN VEIN: CPT

## 2024-11-20 PROCEDURE — 80048 BASIC METABOLIC PNL TOTAL CA: CPT

## 2024-11-20 PROCEDURE — 84295 ASSAY OF SERUM SODIUM: CPT

## 2024-11-20 PROCEDURE — 84132 ASSAY OF SERUM POTASSIUM: CPT

## 2024-12-16 ENCOUNTER — RX RENEWAL (OUTPATIENT)
Age: 84
End: 2024-12-16

## 2024-12-23 ENCOUNTER — APPOINTMENT (OUTPATIENT)
Dept: GASTROENTEROLOGY | Facility: CLINIC | Age: 84
End: 2024-12-23
Payer: MEDICARE

## 2024-12-23 PROCEDURE — 99443: CPT | Mod: 93

## 2024-12-24 ENCOUNTER — APPOINTMENT (OUTPATIENT)
Dept: HEART AND VASCULAR | Facility: CLINIC | Age: 84
End: 2024-12-24

## 2025-01-13 ENCOUNTER — APPOINTMENT (OUTPATIENT)
Dept: GERIATRICS | Facility: CLINIC | Age: 85
End: 2025-01-13
Payer: MEDICARE

## 2025-01-13 VITALS
DIASTOLIC BLOOD PRESSURE: 68 MMHG | BODY MASS INDEX: 32.95 KG/M2 | WEIGHT: 186 LBS | TEMPERATURE: 98.2 F | HEART RATE: 73 BPM | OXYGEN SATURATION: 99 % | SYSTOLIC BLOOD PRESSURE: 118 MMHG

## 2025-01-13 DIAGNOSIS — K59.09 OTHER CONSTIPATION: ICD-10-CM

## 2025-01-13 DIAGNOSIS — R23.4 CHANGES IN SKIN TEXTURE: ICD-10-CM

## 2025-01-13 DIAGNOSIS — R42 DIZZINESS AND GIDDINESS: ICD-10-CM

## 2025-01-13 DIAGNOSIS — E78.5 HYPERLIPIDEMIA, UNSPECIFIED: ICD-10-CM

## 2025-01-13 DIAGNOSIS — L29.9 PRURITUS, UNSPECIFIED: ICD-10-CM

## 2025-01-13 DIAGNOSIS — K62.5 HEMORRHAGE OF ANUS AND RECTUM: ICD-10-CM

## 2025-01-13 DIAGNOSIS — R26.9 UNSPECIFIED ABNORMALITIES OF GAIT AND MOBILITY: ICD-10-CM

## 2025-01-13 DIAGNOSIS — E55.9 VITAMIN D DEFICIENCY, UNSPECIFIED: ICD-10-CM

## 2025-01-13 DIAGNOSIS — F32.A DEPRESSION, UNSPECIFIED: ICD-10-CM

## 2025-01-13 DIAGNOSIS — H61.20 IMPACTED CERUMEN, UNSPECIFIED EAR: ICD-10-CM

## 2025-01-13 DIAGNOSIS — I10 ESSENTIAL (PRIMARY) HYPERTENSION: ICD-10-CM

## 2025-01-13 DIAGNOSIS — K58.9 IRRITABLE BOWEL SYNDROME, UNSPECIFIED: ICD-10-CM

## 2025-01-13 DIAGNOSIS — M54.9 DORSALGIA, UNSPECIFIED: ICD-10-CM

## 2025-01-13 PROCEDURE — 36415 COLL VENOUS BLD VENIPUNCTURE: CPT

## 2025-01-13 PROCEDURE — G2211 COMPLEX E/M VISIT ADD ON: CPT

## 2025-01-13 PROCEDURE — 99215 OFFICE O/P EST HI 40 MIN: CPT

## 2025-01-13 RX ORDER — CARBAMIDE PEROXIDE 6.5 %
6.5 DROPS OTIC (EAR) DAILY
Qty: 1 | Refills: 1 | Status: ACTIVE | COMMUNITY
Start: 2025-01-13 | End: 1900-01-01

## 2025-01-14 LAB
25(OH)D3 SERPL-MCNC: 39.5 NG/ML
ALBUMIN SERPL ELPH-MCNC: 4.1 G/DL
ALP BLD-CCNC: 76 U/L
ALT SERPL-CCNC: 13 U/L
ANION GAP SERPL CALC-SCNC: 14 MMOL/L
AST SERPL-CCNC: 13 U/L
BILIRUB SERPL-MCNC: 0.3 MG/DL
BUN SERPL-MCNC: 16 MG/DL
CALCIUM SERPL-MCNC: 10.1 MG/DL
CHLORIDE SERPL-SCNC: 104 MMOL/L
CO2 SERPL-SCNC: 26 MMOL/L
CREAT SERPL-MCNC: 0.7 MG/DL
EGFR: 84 ML/MIN/1.73M2
GLUCOSE SERPL-MCNC: 113 MG/DL
HCT VFR BLD CALC: 43.7 %
HGB BLD-MCNC: 13.8 G/DL
MCHC RBC-ENTMCNC: 29.5 PG
MCHC RBC-ENTMCNC: 31.6 G/DL
MCV RBC AUTO: 93.4 FL
PLATELET # BLD AUTO: 192 K/UL
POTASSIUM SERPL-SCNC: 4.6 MMOL/L
PROT SERPL-MCNC: 6.4 G/DL
RBC # BLD: 4.68 M/UL
RBC # FLD: 14.5 %
SODIUM SERPL-SCNC: 144 MMOL/L
WBC # FLD AUTO: 5.12 K/UL

## 2025-01-14 RX ORDER — NORTRIPTYLINE HYDROCHLORIDE 25 MG/1
25 CAPSULE ORAL
Qty: 30 | Refills: 0 | Status: ACTIVE | COMMUNITY
Start: 2024-12-18

## 2025-01-30 ENCOUNTER — APPOINTMENT (OUTPATIENT)
Dept: HEART AND VASCULAR | Facility: CLINIC | Age: 85
End: 2025-01-30
Payer: MEDICARE

## 2025-01-30 ENCOUNTER — NON-APPOINTMENT (OUTPATIENT)
Age: 85
End: 2025-01-30

## 2025-01-30 VITALS
TEMPERATURE: 98 F | SYSTOLIC BLOOD PRESSURE: 135 MMHG | WEIGHT: 186 LBS | HEIGHT: 63 IN | BODY MASS INDEX: 32.96 KG/M2 | OXYGEN SATURATION: 95 % | DIASTOLIC BLOOD PRESSURE: 72 MMHG | HEART RATE: 72 BPM

## 2025-01-30 DIAGNOSIS — E78.5 HYPERLIPIDEMIA, UNSPECIFIED: ICD-10-CM

## 2025-01-30 DIAGNOSIS — I25.10 ATHEROSCLEROTIC HEART DISEASE OF NATIVE CORONARY ARTERY W/OUT ANGINA PECTORIS: ICD-10-CM

## 2025-01-30 PROCEDURE — G2211 COMPLEX E/M VISIT ADD ON: CPT

## 2025-01-30 PROCEDURE — 99214 OFFICE O/P EST MOD 30 MIN: CPT

## 2025-01-30 PROCEDURE — 93000 ELECTROCARDIOGRAM COMPLETE: CPT

## 2025-02-06 ENCOUNTER — RX RENEWAL (OUTPATIENT)
Age: 85
End: 2025-02-06

## 2025-03-05 ENCOUNTER — RX RENEWAL (OUTPATIENT)
Age: 85
End: 2025-03-05

## 2025-03-05 ENCOUNTER — NON-APPOINTMENT (OUTPATIENT)
Age: 85
End: 2025-03-05

## 2025-03-05 ENCOUNTER — APPOINTMENT (OUTPATIENT)
Dept: HEART AND VASCULAR | Facility: CLINIC | Age: 85
End: 2025-03-05
Payer: MEDICARE

## 2025-03-05 VITALS
HEART RATE: 69 BPM | BODY MASS INDEX: 32.96 KG/M2 | SYSTOLIC BLOOD PRESSURE: 110 MMHG | OXYGEN SATURATION: 98 % | DIASTOLIC BLOOD PRESSURE: 70 MMHG | WEIGHT: 186 LBS | HEIGHT: 63 IN | TEMPERATURE: 98.1 F

## 2025-03-05 DIAGNOSIS — E78.5 HYPERLIPIDEMIA, UNSPECIFIED: ICD-10-CM

## 2025-03-05 DIAGNOSIS — I35.1 NONRHEUMATIC AORTIC (VALVE) INSUFFICIENCY: ICD-10-CM

## 2025-03-05 DIAGNOSIS — I10 ESSENTIAL (PRIMARY) HYPERTENSION: ICD-10-CM

## 2025-03-05 DIAGNOSIS — I34.0 NONRHEUMATIC MITRAL (VALVE) INSUFFICIENCY: ICD-10-CM

## 2025-03-05 PROCEDURE — 93000 ELECTROCARDIOGRAM COMPLETE: CPT

## 2025-03-05 PROCEDURE — 93306 TTE W/DOPPLER COMPLETE: CPT

## 2025-03-05 PROCEDURE — 99214 OFFICE O/P EST MOD 30 MIN: CPT | Mod: 25

## 2025-03-05 PROCEDURE — ZZZZZ: CPT

## 2025-03-06 LAB
APPEARANCE: CLEAR
BACTERIA: ABNORMAL /HPF
BILIRUBIN URINE: ABNORMAL
BLOOD URINE: NEGATIVE
CAST: 1 /LPF
COLOR: NORMAL
EPITHELIAL CELLS: 6 /HPF
GLUCOSE QUALITATIVE U: NEGATIVE MG/DL
KETONES URINE: ABNORMAL MG/DL
LEUKOCYTE ESTERASE URINE: ABNORMAL
MICROSCOPIC-UA: NORMAL
NITRITE URINE: NEGATIVE
PH URINE: 5.5
PROTEIN URINE: NORMAL MG/DL
RED BLOOD CELLS URINE: 1 /HPF
SPECIFIC GRAVITY URINE: >1.03
UROBILINOGEN URINE: 1 MG/DL
WHITE BLOOD CELLS URINE: 3 /HPF

## 2025-03-07 LAB — NT-PROBNP SERPL-MCNC: 112 PG/ML

## 2025-03-19 ENCOUNTER — APPOINTMENT (OUTPATIENT)
Dept: OBGYN | Facility: CLINIC | Age: 85
End: 2025-03-19

## 2025-04-04 ENCOUNTER — APPOINTMENT (OUTPATIENT)
Dept: GERIATRICS | Facility: CLINIC | Age: 85
End: 2025-04-04
Payer: MEDICARE

## 2025-04-04 VITALS
HEART RATE: 71 BPM | SYSTOLIC BLOOD PRESSURE: 144 MMHG | BODY MASS INDEX: 32.59 KG/M2 | DIASTOLIC BLOOD PRESSURE: 71 MMHG | WEIGHT: 184 LBS | OXYGEN SATURATION: 97 % | TEMPERATURE: 97.8 F

## 2025-04-04 DIAGNOSIS — M54.9 DORSALGIA, UNSPECIFIED: ICD-10-CM

## 2025-04-04 DIAGNOSIS — R32 UNSPECIFIED URINARY INCONTINENCE: ICD-10-CM

## 2025-04-04 DIAGNOSIS — E55.9 VITAMIN D DEFICIENCY, UNSPECIFIED: ICD-10-CM

## 2025-04-04 DIAGNOSIS — I10 ESSENTIAL (PRIMARY) HYPERTENSION: ICD-10-CM

## 2025-04-04 DIAGNOSIS — E78.5 HYPERLIPIDEMIA, UNSPECIFIED: ICD-10-CM

## 2025-04-04 DIAGNOSIS — R60.0 LOCALIZED EDEMA: ICD-10-CM

## 2025-04-04 DIAGNOSIS — K21.9 GASTRO-ESOPHAGEAL REFLUX DISEASE W/OUT ESOPHAGITIS: ICD-10-CM

## 2025-04-04 DIAGNOSIS — R35.0 FREQUENCY OF MICTURITION: ICD-10-CM

## 2025-04-04 DIAGNOSIS — K59.09 OTHER CONSTIPATION: ICD-10-CM

## 2025-04-04 DIAGNOSIS — F32.A DEPRESSION, UNSPECIFIED: ICD-10-CM

## 2025-04-04 DIAGNOSIS — R26.9 UNSPECIFIED ABNORMALITIES OF GAIT AND MOBILITY: ICD-10-CM

## 2025-04-04 PROCEDURE — 99215 OFFICE O/P EST HI 40 MIN: CPT

## 2025-04-04 PROCEDURE — G2211 COMPLEX E/M VISIT ADD ON: CPT

## 2025-04-23 NOTE — DISCHARGE NOTE ADULT - CONTRAINDICATIONS & PRECAUTIONS (SELECT ALL THAT APPLY)
Chief Complaint: .Quique Ab is here today for   Chief Complaint   Patient presents with    Follow-up     6 mo f/u          Medications: medications verified and updated    Refills needed today?  NO    Latex allergy or sensitivity: No known latex allergy     Patient would like communication of their results via:  [facility]    Cell Phone:   Telephone Information:   Mobile [phone]     Okay to leave a message containing results? Yes    Patient's current [facility] status: Active.    Advanced directives: on file    Care Teams Verified: No Changes    Depression Screen: yes    Tobacco history: verified       Health Maintenance       Shingles Vaccine (1 of 2)  Overdue since 2/6/2009    DTaP/Tdap/Td Vaccine (2 - Td or Tdap)  Overdue since 6/22/2017    COVID-19 Vaccine (7 - Moderna risk 2024-25 season)  Overdue since 3/26/2025    Hepatitis C Screening (Once)  Never done    Respiratory Syncytial Virus (RSV) Vaccine 60+ (1 - 1-dose 75+ series)  Never done    Traditional Medicare- Medicare Wellness Visit (Yearly)  Due soon on 10/1/2025           Following review of the above:  Patient is not proceeding with: COVID-19, Dtap/Tdap/Td, Respiratory Syncytial Virus (RSV), and Shingles    Note: Refer to final orders and clinician documentation.             Moderate to severe acute illness with or without fever. Delay administration of vaccination until patient has been afebrile or illness resolved for 24hrs

## 2025-05-05 ENCOUNTER — NON-APPOINTMENT (OUTPATIENT)
Age: 85
End: 2025-05-05

## 2025-05-05 ENCOUNTER — APPOINTMENT (OUTPATIENT)
Dept: VASCULAR SURGERY | Facility: CLINIC | Age: 85
End: 2025-05-05
Payer: MEDICARE

## 2025-05-05 VITALS
WEIGHT: 186 LBS | HEART RATE: 69 BPM | SYSTOLIC BLOOD PRESSURE: 125 MMHG | HEIGHT: 64 IN | BODY MASS INDEX: 31.76 KG/M2 | DIASTOLIC BLOOD PRESSURE: 78 MMHG

## 2025-05-05 PROCEDURE — 93970 EXTREMITY STUDY: CPT

## 2025-05-05 PROCEDURE — 99204 OFFICE O/P NEW MOD 45 MIN: CPT

## 2025-06-04 ENCOUNTER — APPOINTMENT (OUTPATIENT)
Dept: GERIATRICS | Facility: CLINIC | Age: 85
End: 2025-06-04
Payer: MEDICARE

## 2025-06-04 VITALS
HEIGHT: 64 IN | SYSTOLIC BLOOD PRESSURE: 123 MMHG | TEMPERATURE: 97.5 F | OXYGEN SATURATION: 97 % | HEART RATE: 72 BPM | DIASTOLIC BLOOD PRESSURE: 76 MMHG

## 2025-06-04 DIAGNOSIS — I10 ESSENTIAL (PRIMARY) HYPERTENSION: ICD-10-CM

## 2025-06-04 DIAGNOSIS — K59.09 OTHER CONSTIPATION: ICD-10-CM

## 2025-06-04 DIAGNOSIS — R26.9 UNSPECIFIED ABNORMALITIES OF GAIT AND MOBILITY: ICD-10-CM

## 2025-06-04 DIAGNOSIS — F32.A DEPRESSION, UNSPECIFIED: ICD-10-CM

## 2025-06-04 DIAGNOSIS — R23.4 CHANGES IN SKIN TEXTURE: ICD-10-CM

## 2025-06-04 DIAGNOSIS — L98.9 DISORDER OF THE SKIN AND SUBCUTANEOUS TISSUE, UNSPECIFIED: ICD-10-CM

## 2025-06-04 DIAGNOSIS — R32 UNSPECIFIED URINARY INCONTINENCE: ICD-10-CM

## 2025-06-04 DIAGNOSIS — K21.9 GASTRO-ESOPHAGEAL REFLUX DISEASE W/OUT ESOPHAGITIS: ICD-10-CM

## 2025-06-04 DIAGNOSIS — E55.9 VITAMIN D DEFICIENCY, UNSPECIFIED: ICD-10-CM

## 2025-06-04 PROCEDURE — 36415 COLL VENOUS BLD VENIPUNCTURE: CPT

## 2025-06-04 PROCEDURE — 99215 OFFICE O/P EST HI 40 MIN: CPT

## 2025-06-04 PROCEDURE — G2211 COMPLEX E/M VISIT ADD ON: CPT

## 2025-06-27 ENCOUNTER — RX RENEWAL (OUTPATIENT)
Age: 85
End: 2025-06-27

## 2025-07-14 ENCOUNTER — OUTPATIENT (OUTPATIENT)
Dept: OUTPATIENT SERVICES | Facility: HOSPITAL | Age: 85
LOS: 1 days | End: 2025-07-14

## 2025-07-14 ENCOUNTER — APPOINTMENT (OUTPATIENT)
Dept: MRI IMAGING | Facility: HOSPITAL | Age: 85
End: 2025-07-14
Payer: MEDICARE

## 2025-07-14 DIAGNOSIS — Z95.5 PRESENCE OF CORONARY ANGIOPLASTY IMPLANT AND GRAFT: Chronic | ICD-10-CM

## 2025-07-14 DIAGNOSIS — Z90.12 ACQUIRED ABSENCE OF LEFT BREAST AND NIPPLE: Chronic | ICD-10-CM

## 2025-07-14 PROCEDURE — A9585: CPT

## 2025-07-14 PROCEDURE — 72158 MRI LUMBAR SPINE W/O & W/DYE: CPT | Mod: MB

## 2025-07-14 PROCEDURE — 72158 MRI LUMBAR SPINE W/O & W/DYE: CPT | Mod: 26

## 2025-07-16 ENCOUNTER — APPOINTMENT (OUTPATIENT)
Dept: HEART AND VASCULAR | Facility: CLINIC | Age: 85
End: 2025-07-16
Payer: MEDICARE

## 2025-07-16 ENCOUNTER — NON-APPOINTMENT (OUTPATIENT)
Age: 85
End: 2025-07-16

## 2025-07-16 VITALS
HEART RATE: 72 BPM | OXYGEN SATURATION: 97 % | HEIGHT: 64 IN | SYSTOLIC BLOOD PRESSURE: 124 MMHG | WEIGHT: 184 LBS | DIASTOLIC BLOOD PRESSURE: 68 MMHG | BODY MASS INDEX: 31.41 KG/M2 | TEMPERATURE: 98 F

## 2025-07-16 PROCEDURE — G2211 COMPLEX E/M VISIT ADD ON: CPT

## 2025-07-16 PROCEDURE — 93000 ELECTROCARDIOGRAM COMPLETE: CPT

## 2025-07-16 PROCEDURE — 99214 OFFICE O/P EST MOD 30 MIN: CPT

## 2025-07-16 RX ORDER — HYDROCHLOROTHIAZIDE 25 MG/1
25 TABLET ORAL DAILY
Qty: 30 | Refills: 5 | Status: ACTIVE | COMMUNITY
Start: 2025-07-16 | End: 1900-01-01

## 2025-07-28 ENCOUNTER — RX RENEWAL (OUTPATIENT)
Age: 85
End: 2025-07-28

## 2025-07-29 ENCOUNTER — APPOINTMENT (OUTPATIENT)
Dept: GERIATRICS | Facility: CLINIC | Age: 85
End: 2025-07-29
Payer: MEDICARE

## 2025-07-29 DIAGNOSIS — J06.9 ACUTE UPPER RESPIRATORY INFECTION, UNSPECIFIED: ICD-10-CM

## 2025-07-29 DIAGNOSIS — R05.1 ACUTE COUGH: ICD-10-CM

## 2025-07-29 PROCEDURE — 99213 OFFICE O/P EST LOW 20 MIN: CPT | Mod: 2W

## 2025-07-29 RX ORDER — BENZONATATE 100 MG/1
100 CAPSULE ORAL 3 TIMES DAILY
Qty: 15 | Refills: 0 | Status: COMPLETED | COMMUNITY
Start: 2025-07-29 | End: 2025-08-03

## 2025-08-29 ENCOUNTER — APPOINTMENT (OUTPATIENT)
Dept: HEART AND VASCULAR | Facility: CLINIC | Age: 85
End: 2025-08-29
Payer: MEDICARE

## 2025-08-29 DIAGNOSIS — R06.09 OTHER FORMS OF DYSPNEA: ICD-10-CM

## 2025-08-29 DIAGNOSIS — I25.10 ATHEROSCLEROTIC HEART DISEASE OF NATIVE CORONARY ARTERY W/OUT ANGINA PECTORIS: ICD-10-CM

## 2025-08-29 PROCEDURE — A9500: CPT

## 2025-08-29 PROCEDURE — 99213 OFFICE O/P EST LOW 20 MIN: CPT

## 2025-08-29 PROCEDURE — 78452 HT MUSCLE IMAGE SPECT MULT: CPT

## 2025-09-02 ENCOUNTER — APPOINTMENT (OUTPATIENT)
Dept: GASTROENTEROLOGY | Facility: CLINIC | Age: 85
End: 2025-09-02

## 2025-09-02 VITALS
WEIGHT: 180 LBS | DIASTOLIC BLOOD PRESSURE: 70 MMHG | BODY MASS INDEX: 31.89 KG/M2 | SYSTOLIC BLOOD PRESSURE: 132 MMHG | OXYGEN SATURATION: 99 % | HEIGHT: 63 IN | TEMPERATURE: 97.3 F | HEART RATE: 63 BPM | RESPIRATION RATE: 15 BRPM

## 2025-09-02 DIAGNOSIS — K58.9 IRRITABLE BOWEL SYNDROME, UNSPECIFIED: ICD-10-CM

## 2025-09-02 PROCEDURE — 99215 OFFICE O/P EST HI 40 MIN: CPT

## 2025-09-02 PROCEDURE — G2211 COMPLEX E/M VISIT ADD ON: CPT

## 2025-09-03 ENCOUNTER — APPOINTMENT (OUTPATIENT)
Dept: UROLOGY | Facility: CLINIC | Age: 85
End: 2025-09-03

## 2025-09-08 ENCOUNTER — APPOINTMENT (OUTPATIENT)
Dept: GERIATRICS | Facility: CLINIC | Age: 85
End: 2025-09-08
Payer: MEDICARE

## 2025-09-08 VITALS
OXYGEN SATURATION: 97 % | SYSTOLIC BLOOD PRESSURE: 126 MMHG | DIASTOLIC BLOOD PRESSURE: 79 MMHG | HEART RATE: 64 BPM | WEIGHT: 180 LBS | TEMPERATURE: 98.1 F | BODY MASS INDEX: 31.89 KG/M2

## 2025-09-08 DIAGNOSIS — K59.09 OTHER CONSTIPATION: ICD-10-CM

## 2025-09-08 DIAGNOSIS — R53.83 OTHER FATIGUE: ICD-10-CM

## 2025-09-08 DIAGNOSIS — R32 UNSPECIFIED URINARY INCONTINENCE: ICD-10-CM

## 2025-09-08 DIAGNOSIS — M54.9 DORSALGIA, UNSPECIFIED: ICD-10-CM

## 2025-09-08 DIAGNOSIS — R06.09 OTHER FORMS OF DYSPNEA: ICD-10-CM

## 2025-09-08 DIAGNOSIS — E55.9 VITAMIN D DEFICIENCY, UNSPECIFIED: ICD-10-CM

## 2025-09-08 DIAGNOSIS — E04.1 NONTOXIC SINGLE THYROID NODULE: ICD-10-CM

## 2025-09-08 DIAGNOSIS — K62.5 HEMORRHAGE OF ANUS AND RECTUM: ICD-10-CM

## 2025-09-08 DIAGNOSIS — I10 ESSENTIAL (PRIMARY) HYPERTENSION: ICD-10-CM

## 2025-09-08 DIAGNOSIS — F32.A DEPRESSION, UNSPECIFIED: ICD-10-CM

## 2025-09-08 PROCEDURE — G2211 COMPLEX E/M VISIT ADD ON: CPT

## 2025-09-08 PROCEDURE — 36415 COLL VENOUS BLD VENIPUNCTURE: CPT

## 2025-09-08 PROCEDURE — 99215 OFFICE O/P EST HI 40 MIN: CPT

## 2025-09-09 ENCOUNTER — RESULT REVIEW (OUTPATIENT)
Age: 85
End: 2025-09-09

## 2025-09-09 LAB
ALBUMIN SERPL ELPH-MCNC: 4 G/DL
ALP BLD-CCNC: 68 U/L
ALT SERPL-CCNC: 16 U/L
ANION GAP SERPL CALC-SCNC: 11 MMOL/L
AST SERPL-CCNC: 15 U/L
BILIRUB SERPL-MCNC: 0.3 MG/DL
BUN SERPL-MCNC: 21 MG/DL
CALCIUM SERPL-MCNC: 10.4 MG/DL
CHLORIDE SERPL-SCNC: 103 MMOL/L
CHOLEST SERPL-MCNC: 131 MG/DL
CO2 SERPL-SCNC: 30 MMOL/L
CREAT SERPL-MCNC: 0.73 MG/DL
EGFRCR SERPLBLD CKD-EPI 2021: 80 ML/MIN/1.73M2
GLUCOSE SERPL-MCNC: 76 MG/DL
HCT VFR BLD CALC: 45.1 %
HDLC SERPL-MCNC: 43 MG/DL
HGB BLD-MCNC: 13.5 G/DL
LDLC SERPL-MCNC: 68 MG/DL
MCHC RBC-ENTMCNC: 29.9 G/DL
MCHC RBC-ENTMCNC: 30 PG
MCV RBC AUTO: 100.2 FL
NONHDLC SERPL-MCNC: 88 MG/DL
NT-PROBNP SERPL-MCNC: 144 PG/ML
PLATELET # BLD AUTO: 215 K/UL
POTASSIUM SERPL-SCNC: 5 MMOL/L
PROT SERPL-MCNC: 6.6 G/DL
RBC # BLD: 4.5 M/UL
RBC # FLD: 14.4 %
SODIUM SERPL-SCNC: 144 MMOL/L
TRIGL SERPL-MCNC: 109 MG/DL
TSH SERPL-ACNC: 1.67 UIU/ML
WBC # FLD AUTO: 7.24 K/UL

## 2025-09-11 ENCOUNTER — RESULT REVIEW (OUTPATIENT)
Age: 85
End: 2025-09-11

## 2025-09-16 ENCOUNTER — APPOINTMENT (OUTPATIENT)
Dept: HEART AND VASCULAR | Facility: CLINIC | Age: 85
End: 2025-09-16
Payer: MEDICARE

## 2025-09-16 DIAGNOSIS — I25.10 ATHEROSCLEROTIC HEART DISEASE OF NATIVE CORONARY ARTERY W/OUT ANGINA PECTORIS: ICD-10-CM

## 2025-09-16 PROCEDURE — 99213 OFFICE O/P EST LOW 20 MIN: CPT | Mod: 93
